# Patient Record
Sex: FEMALE | Race: WHITE | NOT HISPANIC OR LATINO | Employment: OTHER | URBAN - METROPOLITAN AREA
[De-identification: names, ages, dates, MRNs, and addresses within clinical notes are randomized per-mention and may not be internally consistent; named-entity substitution may affect disease eponyms.]

---

## 2019-09-25 ENCOUNTER — TELEPHONE (OUTPATIENT)
Dept: FAMILY MEDICINE | Facility: CLINIC | Age: 59
End: 2019-09-25

## 2019-09-25 NOTE — TELEPHONE ENCOUNTER
----- Message from Lulu Cruz sent at 9/25/2019 11:07 AM CDT -----  - pt would like an appt.   [phone]

## 2019-10-30 ENCOUNTER — TELEPHONE (OUTPATIENT)
Dept: FAMILY MEDICINE | Facility: CLINIC | Age: 59
End: 2019-10-30

## 2019-10-30 RX ORDER — DOXYCYCLINE 100 MG/1
100 CAPSULE ORAL 2 TIMES DAILY
Qty: 20 CAPSULE | Refills: 0 | Status: SHIPPED | OUTPATIENT
Start: 2019-10-30 | End: 2019-11-09

## 2019-10-30 NOTE — TELEPHONE ENCOUNTER
----- Message from Nallely John sent at 10/30/2019  9:11 AM CDT -----  Pt has a cold was wheezing a little last night and is feeling bad, congested, has been feeling like this since Friday wants something called in   [facility] [place]   [phone]

## 2019-12-16 ENCOUNTER — LAB VISIT (OUTPATIENT)
Dept: LAB | Facility: HOSPITAL | Age: 59
End: 2019-12-16
Attending: INTERNAL MEDICINE
Payer: COMMERCIAL

## 2019-12-16 DIAGNOSIS — I48.0 PAROXYSMAL ATRIAL FIBRILLATION: ICD-10-CM

## 2019-12-16 DIAGNOSIS — I50.9 HEART FAILURE, UNSPECIFIED: ICD-10-CM

## 2019-12-16 DIAGNOSIS — I10 ESSENTIAL HYPERTENSION, MALIGNANT: ICD-10-CM

## 2019-12-16 DIAGNOSIS — E78.5 HYPERLIPEMIA: Primary | ICD-10-CM

## 2019-12-16 LAB
ALBUMIN SERPL BCP-MCNC: 3.6 G/DL (ref 3.5–5.2)
ALP SERPL-CCNC: 73 U/L (ref 55–135)
ALT SERPL W/O P-5'-P-CCNC: 13 U/L (ref 10–44)
ANION GAP SERPL CALC-SCNC: 2 MMOL/L (ref 8–16)
AST SERPL-CCNC: 16 U/L (ref 10–40)
BASOPHILS # BLD AUTO: 0.02 K/UL (ref 0–0.2)
BASOPHILS NFR BLD: 0.5 % (ref 0–1.9)
BILIRUB SERPL-MCNC: 0.7 MG/DL (ref 0.1–1)
BUN SERPL-MCNC: 10 MG/DL (ref 8–23)
CALCIUM SERPL-MCNC: 9.9 MG/DL (ref 8.7–10.5)
CHLORIDE SERPL-SCNC: 106 MMOL/L (ref 95–110)
CHOLEST SERPL-MCNC: 194 MG/DL (ref 120–199)
CHOLEST/HDLC SERPL: 3.3 {RATIO} (ref 2–5)
CO2 SERPL-SCNC: 29 MMOL/L (ref 23–29)
CREAT SERPL-MCNC: 0.6 MG/DL (ref 0.5–1.4)
DIFFERENTIAL METHOD: ABNORMAL
EOSINOPHIL # BLD AUTO: 0.1 K/UL (ref 0–0.5)
EOSINOPHIL NFR BLD: 1.3 % (ref 0–8)
ERYTHROCYTE [DISTWIDTH] IN BLOOD BY AUTOMATED COUNT: 13.2 % (ref 11.5–14.5)
EST. GFR  (AFRICAN AMERICAN): >60 ML/MIN/1.73 M^2
EST. GFR  (NON AFRICAN AMERICAN): >60 ML/MIN/1.73 M^2
GLUCOSE SERPL-MCNC: 101 MG/DL (ref 70–110)
HCT VFR BLD AUTO: 36.5 % (ref 37–48.5)
HDLC SERPL-MCNC: 58 MG/DL (ref 40–75)
HDLC SERPL: 29.9 % (ref 20–50)
HGB BLD-MCNC: 11.4 G/DL (ref 12–16)
IMM GRANULOCYTES # BLD AUTO: 0.01 K/UL (ref 0–0.04)
IMM GRANULOCYTES NFR BLD AUTO: 0.3 % (ref 0–0.5)
LDLC SERPL CALC-MCNC: 128 MG/DL (ref 63–159)
LYMPHOCYTES # BLD AUTO: 1.7 K/UL (ref 1–4.8)
LYMPHOCYTES NFR BLD: 41.6 % (ref 18–48)
MAGNESIUM SERPL-MCNC: 1.8 MG/DL (ref 1.6–2.6)
MCH RBC QN AUTO: 31 PG (ref 27–31)
MCHC RBC AUTO-ENTMCNC: 31.2 G/DL (ref 32–36)
MCV RBC AUTO: 99 FL (ref 82–98)
MONOCYTES # BLD AUTO: 0.4 K/UL (ref 0.3–1)
MONOCYTES NFR BLD: 9.3 % (ref 4–15)
NEUTROPHILS # BLD AUTO: 1.9 K/UL (ref 1.8–7.7)
NEUTROPHILS NFR BLD: 47 % (ref 38–73)
NONHDLC SERPL-MCNC: 136 MG/DL
NRBC BLD-RTO: 0 /100 WBC
PLATELET # BLD AUTO: 210 K/UL (ref 150–350)
PMV BLD AUTO: 11.5 FL (ref 9.2–12.9)
POTASSIUM SERPL-SCNC: 3.9 MMOL/L (ref 3.5–5.1)
PROT SERPL-MCNC: 7.7 G/DL (ref 6–8.4)
RBC # BLD AUTO: 3.68 M/UL (ref 4–5.4)
SODIUM SERPL-SCNC: 137 MMOL/L (ref 136–145)
TRIGL SERPL-MCNC: 40 MG/DL (ref 30–150)
WBC # BLD AUTO: 3.97 K/UL (ref 3.9–12.7)

## 2019-12-16 PROCEDURE — 36415 COLL VENOUS BLD VENIPUNCTURE: CPT

## 2019-12-16 PROCEDURE — 85025 COMPLETE CBC W/AUTO DIFF WBC: CPT

## 2019-12-16 PROCEDURE — 83735 ASSAY OF MAGNESIUM: CPT

## 2019-12-16 PROCEDURE — 80061 LIPID PANEL: CPT

## 2019-12-16 PROCEDURE — 80053 COMPREHEN METABOLIC PANEL: CPT

## 2019-12-19 ENCOUNTER — OFFICE VISIT (OUTPATIENT)
Dept: FAMILY MEDICINE | Facility: CLINIC | Age: 59
End: 2019-12-19
Payer: COMMERCIAL

## 2019-12-19 VITALS
BODY MASS INDEX: 45.31 KG/M2 | HEIGHT: 61 IN | HEART RATE: 68 BPM | DIASTOLIC BLOOD PRESSURE: 92 MMHG | SYSTOLIC BLOOD PRESSURE: 152 MMHG | WEIGHT: 240 LBS

## 2019-12-19 DIAGNOSIS — I48.0 PAROXYSMAL ATRIAL FIBRILLATION: ICD-10-CM

## 2019-12-19 DIAGNOSIS — Z12.31 ENCOUNTER FOR SCREENING MAMMOGRAM FOR BREAST CANCER: Primary | ICD-10-CM

## 2019-12-19 DIAGNOSIS — Z00.00 ANNUAL PHYSICAL EXAM: Primary | ICD-10-CM

## 2019-12-19 DIAGNOSIS — Z12.39 BREAST CANCER SCREENING: ICD-10-CM

## 2019-12-19 DIAGNOSIS — I10 ESSENTIAL HYPERTENSION: ICD-10-CM

## 2019-12-19 PROCEDURE — 99396 PR PREVENTIVE VISIT,EST,40-64: ICD-10-PCS | Mod: S$GLB,,, | Performed by: FAMILY MEDICINE

## 2019-12-19 PROCEDURE — 99396 PREV VISIT EST AGE 40-64: CPT | Mod: S$GLB,,, | Performed by: FAMILY MEDICINE

## 2019-12-19 RX ORDER — METOPROLOL TARTRATE 50 MG/1
50 TABLET ORAL 2 TIMES DAILY
Refills: 2 | COMMUNITY
Start: 2019-09-26 | End: 2020-09-29

## 2019-12-19 RX ORDER — APIXABAN 5 MG/1
5 TABLET, FILM COATED ORAL 2 TIMES DAILY
Refills: 2 | COMMUNITY
Start: 2019-09-26 | End: 2020-10-03

## 2019-12-19 RX ORDER — LOSARTAN POTASSIUM 100 MG/1
100 TABLET ORAL DAILY
Refills: 5 | COMMUNITY
Start: 2019-10-07 | End: 2021-04-19 | Stop reason: SDUPTHER

## 2019-12-19 NOTE — PROGRESS NOTES
SUBJECTIVE:   HPI: Paola Ibanez  is a 59 y.o. female who presents for annual physical .   Regular Check Up    Needs eye appointment. UTD dental appt. Declines flu shot. Mammogram due. Follow with cardiologist for afib. BP stays 150/90 and reports no changes made by cardiology. Labs reviewed and are doing well. Weight stable. No regular exercise. Caring for  who has had recent toe amputations.  No falls.  C/o onychomycosis. Using otc agents. UTD with colonscopy     Lab Visit on 12/16/2019   Component Date Value Ref Range Status    Sodium 12/16/2019 137  136 - 145 mmol/L Final    Potassium 12/16/2019 3.9  3.5 - 5.1 mmol/L Final    Chloride 12/16/2019 106  95 - 110 mmol/L Final    CO2 12/16/2019 29  23 - 29 mmol/L Final    Glucose 12/16/2019 101  70 - 110 mg/dL Final    BUN, Bld 12/16/2019 10  8 - 23 mg/dL Final    Creatinine 12/16/2019 0.6  0.5 - 1.4 mg/dL Final    Calcium 12/16/2019 9.9  8.7 - 10.5 mg/dL Final    Total Protein 12/16/2019 7.7  6.0 - 8.4 g/dL Final    Albumin 12/16/2019 3.6  3.5 - 5.2 g/dL Final    Total Bilirubin 12/16/2019 0.7  0.1 - 1.0 mg/dL Final    Alkaline Phosphatase 12/16/2019 73  55 - 135 U/L Final    AST 12/16/2019 16  10 - 40 U/L Final    ALT 12/16/2019 13  10 - 44 U/L Final    Anion Gap 12/16/2019 2* 8 - 16 mmol/L Final    eGFR if African American 12/16/2019 >60.0  >60 mL/min/1.73 m^2 Final    eGFR if non African American 12/16/2019 >60.0  >60 mL/min/1.73 m^2 Final    Cholesterol 12/16/2019 194  120 - 199 mg/dL Final    Triglycerides 12/16/2019 40  30 - 150 mg/dL Final    HDL 12/16/2019 58  40 - 75 mg/dL Final    LDL Cholesterol 12/16/2019 128.0  63.0 - 159.0 mg/dL Final    Hdl/Cholesterol Ratio 12/16/2019 29.9  20.0 - 50.0 % Final    Total Cholesterol/HDL Ratio 12/16/2019 3.3  2.0 - 5.0 Final    Non-HDL Cholesterol 12/16/2019 136  mg/dL Final    WBC 12/16/2019 3.97  3.90 - 12.70 K/uL Final    RBC 12/16/2019 3.68* 4.00 - 5.40 M/uL Final     Hemoglobin 12/16/2019 11.4* 12.0 - 16.0 g/dL Final    Hematocrit 12/16/2019 36.5* 37.0 - 48.5 % Final    Mean Corpuscular Volume 12/16/2019 99* 82 - 98 fL Final    Mean Corpuscular Hemoglobin 12/16/2019 31.0  27.0 - 31.0 pg Final    Mean Corpuscular Hemoglobin Conc 12/16/2019 31.2* 32.0 - 36.0 g/dL Final    RDW 12/16/2019 13.2  11.5 - 14.5 % Final    Platelets 12/16/2019 210  150 - 350 K/uL Final    MPV 12/16/2019 11.5  9.2 - 12.9 fL Final    Immature Granulocytes 12/16/2019 0.3  0.0 - 0.5 % Final    Gran # (ANC) 12/16/2019 1.9  1.8 - 7.7 K/uL Final    Immature Grans (Abs) 12/16/2019 0.01  0.00 - 0.04 K/uL Final    Lymph # 12/16/2019 1.7  1.0 - 4.8 K/uL Final    Mono # 12/16/2019 0.4  0.3 - 1.0 K/uL Final    Eos # 12/16/2019 0.1  0.0 - 0.5 K/uL Final    Baso # 12/16/2019 0.02  0.00 - 0.20 K/uL Final    nRBC 12/16/2019 0  0 /100 WBC Final    Gran% 12/16/2019 47.0  38.0 - 73.0 % Final    Lymph% 12/16/2019 41.6  18.0 - 48.0 % Final    Mono% 12/16/2019 9.3  4.0 - 15.0 % Final    Eosinophil% 12/16/2019 1.3  0.0 - 8.0 % Final    Basophil% 12/16/2019 0.5  0.0 - 1.9 % Final    Differential Method 12/16/2019 Automated   Final    Magnesium 12/16/2019 1.8  1.6 - 2.6 mg/dL Final       (Not in a hospital admission)  Review of patient's allergies indicates:   Allergen Reactions    Aspirin Nausea Only     Stomach Pain  Stomach Pain      Lisinopril Other (See Comments)     Cough  Cough      Penicillin g potassium Rash     Rash  Rash       Current Outpatient Medications on File Prior to Visit   Medication Sig Dispense Refill    ELIQUIS 5 mg Tab Take 5 mg by mouth 2 (two) times daily.  2    losartan (COZAAR) 100 MG tablet Take 100 mg by mouth once daily.  5    metoprolol tartrate (LOPRESSOR) 50 MG tablet Take 50 mg by mouth 2 (two) times daily.  2     No current facility-administered medications on file prior to visit.      History reviewed. No pertinent past medical history.  History reviewed. No  "pertinent surgical history.  Family History   Problem Relation Age of Onset    Seizures Sister      Social History     Tobacco Use    Smoking status: Never Smoker    Smokeless tobacco: Never Used   Substance Use Topics    Alcohol use: Not Currently    Drug use: Never      Health Maintenance Topics with due status: Not Due       Topic Last Completion Date    Colonoscopy 03/02/2015    Lipid Panel 12/16/2019       There is no immunization history on file for this patient.    Review of Systems   Constitutional: Negative for activity change, fatigue and unexpected weight change.   HENT: Negative for hearing loss, postnasal drip, sinus pressure, sore throat and voice change.    Eyes: Negative for photophobia and visual disturbance.   Respiratory: Negative for cough, shortness of breath and wheezing.    Cardiovascular: Negative for chest pain and palpitations.   Gastrointestinal: Negative for constipation, diarrhea and nausea.   Genitourinary: Negative for difficulty urinating, frequency, hematuria and urgency.   Musculoskeletal: Negative for arthralgias and back pain.   Skin: Positive for rash.   Neurological: Negative for weakness, light-headedness and headaches.   Hematological: Negative for adenopathy. Does not bruise/bleed easily.   Psychiatric/Behavioral: The patient is not nervous/anxious.       OBJECTIVE:      Vitals:    12/19/19 0853   BP: (!) 152/92   Pulse: 68   Weight: 108.9 kg (240 lb)   Height: 5' 1" (1.549 m)     Physical Exam   Constitutional: She is oriented to person, place, and time. Vital signs are normal. She appears well-developed and well-nourished. No distress.   HENT:   Head: Normocephalic and atraumatic.   Right Ear: Tympanic membrane and external ear normal.   Left Ear: Tympanic membrane and external ear normal.   Eyes: Pupils are equal, round, and reactive to light. Conjunctivae, EOM and lids are normal.   Neck: Full passive range of motion without pain. Neck supple. No JVD present. No " tracheal deviation present. No thyromegaly present.   Cardiovascular: Normal rate and regular rhythm. PMI is not displaced.   Pulmonary/Chest: Effort normal and breath sounds normal.   Abdominal: Soft. Bowel sounds are normal. There is no hepatosplenomegaly. There is no tenderness. There is no rebound and no guarding.   Musculoskeletal: Normal range of motion. She exhibits no edema or tenderness.   Neurological: She is alert and oriented to person, place, and time.   Skin: Skin is warm and dry. No rash noted.   Psychiatric: She has a normal mood and affect.   Vitals reviewed.     Assessment:       1. Annual physical exam    2. Breast cancer screening    3. Paroxysmal atrial fibrillation    4. Essential hypertension        Plan:       Annual physical exam    Breast cancer screening  -     Mammo Digital Screening Bilat w/ Jonnathan; Future; Expected date: 12/19/2019    Paroxysmal atrial fibrillation    Essential hypertension        Counseled on age and gender appropriate medical preventative services, including cancer screenings, immunizations, overall nutritional health, need for a consistent exercise regimen and an overall push towards maintaining a vigorous and active lifestyle.      Follow up in about 1 year (around 12/19/2020) for Annual Physical.

## 2020-03-14 ENCOUNTER — HOSPITAL ENCOUNTER (EMERGENCY)
Facility: HOSPITAL | Age: 60
Discharge: HOME | End: 2020-03-14
Attending: EMERGENCY MEDICINE
Payer: COMMERCIAL

## 2020-03-14 VITALS
HEART RATE: 59 BPM | WEIGHT: 245 LBS | SYSTOLIC BLOOD PRESSURE: 154 MMHG | DIASTOLIC BLOOD PRESSURE: 100 MMHG | TEMPERATURE: 98 F | RESPIRATION RATE: 20 BRPM | OXYGEN SATURATION: 98 % | HEIGHT: 61 IN | BODY MASS INDEX: 46.26 KG/M2

## 2020-03-14 DIAGNOSIS — K62.5 RECTAL BLEEDING: Primary | ICD-10-CM

## 2020-03-14 LAB
ABO + RH BLD: NORMAL
ALBUMIN SERPL BCP-MCNC: 3.8 G/DL (ref 3.5–5.2)
ALP SERPL-CCNC: 85 U/L (ref 55–135)
ALT SERPL W/O P-5'-P-CCNC: 14 U/L (ref 10–44)
ANION GAP SERPL CALC-SCNC: 4 MMOL/L (ref 8–16)
AST SERPL-CCNC: 14 U/L (ref 10–40)
BASOPHILS # BLD AUTO: 0.03 K/UL (ref 0–0.2)
BASOPHILS NFR BLD: 0.7 % (ref 0–1.9)
BILIRUB SERPL-MCNC: 1.1 MG/DL (ref 0.1–1)
BLD GP AB SCN CELLS X3 SERPL QL: NORMAL
BUN SERPL-MCNC: 16 MG/DL (ref 8–23)
CALCIUM SERPL-MCNC: 9.9 MG/DL (ref 8.7–10.5)
CHLORIDE SERPL-SCNC: 106 MMOL/L (ref 95–110)
CO2 SERPL-SCNC: 26 MMOL/L (ref 23–29)
CREAT SERPL-MCNC: 0.7 MG/DL (ref 0.5–1.4)
DIFFERENTIAL METHOD: ABNORMAL
EOSINOPHIL # BLD AUTO: 0 K/UL (ref 0–0.5)
EOSINOPHIL NFR BLD: 0.7 % (ref 0–8)
ERYTHROCYTE [DISTWIDTH] IN BLOOD BY AUTOMATED COUNT: 12.9 % (ref 11.5–14.5)
EST. GFR  (AFRICAN AMERICAN): >60 ML/MIN/1.73 M^2
EST. GFR  (NON AFRICAN AMERICAN): >60 ML/MIN/1.73 M^2
GLUCOSE SERPL-MCNC: 120 MG/DL (ref 70–110)
HCT VFR BLD AUTO: 34.3 % (ref 37–48.5)
HGB BLD-MCNC: 11 G/DL (ref 12–16)
IMM GRANULOCYTES # BLD AUTO: 0.01 K/UL (ref 0–0.04)
IMM GRANULOCYTES NFR BLD AUTO: 0.2 % (ref 0–0.5)
INR PPP: 1.3
LYMPHOCYTES # BLD AUTO: 1.4 K/UL (ref 1–4.8)
LYMPHOCYTES NFR BLD: 33.2 % (ref 18–48)
MCH RBC QN AUTO: 31.1 PG (ref 27–31)
MCHC RBC AUTO-ENTMCNC: 32.1 G/DL (ref 32–36)
MCV RBC AUTO: 97 FL (ref 82–98)
MONOCYTES # BLD AUTO: 0.4 K/UL (ref 0.3–1)
MONOCYTES NFR BLD: 8.8 % (ref 4–15)
NEUTROPHILS # BLD AUTO: 2.4 K/UL (ref 1.8–7.7)
NEUTROPHILS NFR BLD: 56.4 % (ref 38–73)
NRBC BLD-RTO: 0 /100 WBC
OB PNL STL: POSITIVE
PLATELET # BLD AUTO: 204 K/UL (ref 150–350)
PMV BLD AUTO: 10.9 FL (ref 9.2–12.9)
POTASSIUM SERPL-SCNC: 3.7 MMOL/L (ref 3.5–5.1)
PROT SERPL-MCNC: 7.8 G/DL (ref 6–8.4)
PROTHROMBIN TIME: 15.1 SEC (ref 10.6–14.8)
RBC # BLD AUTO: 3.54 M/UL (ref 4–5.4)
SODIUM SERPL-SCNC: 136 MMOL/L (ref 136–145)
WBC # BLD AUTO: 4.22 K/UL (ref 3.9–12.7)

## 2020-03-14 PROCEDURE — 82272 OCCULT BLD FECES 1-3 TESTS: CPT

## 2020-03-14 PROCEDURE — 86850 RBC ANTIBODY SCREEN: CPT

## 2020-03-14 PROCEDURE — 85610 PROTHROMBIN TIME: CPT

## 2020-03-14 PROCEDURE — 80053 COMPREHEN METABOLIC PANEL: CPT

## 2020-03-14 PROCEDURE — 99283 EMERGENCY DEPT VISIT LOW MDM: CPT | Mod: 25

## 2020-03-14 PROCEDURE — 85025 COMPLETE CBC W/AUTO DIFF WBC: CPT

## 2020-03-14 RX ORDER — DOCUSATE SODIUM 100 MG/1
100 CAPSULE, LIQUID FILLED ORAL 2 TIMES DAILY
Qty: 28 CAPSULE | Refills: 0 | Status: SHIPPED | OUTPATIENT
Start: 2020-03-14 | End: 2020-03-28

## 2020-03-14 NOTE — ED PROVIDER NOTES
"Encounter Date: 3/14/2020       History     Chief Complaint   Patient presents with    Rectal Bleeding     "bright red blood in stools this morning"      60-year-old female with a history of diverticulitis, AFib on Eliquis presents to the ER with rectal bleeding.  Patient states that for the past couple of days, she has had a change in her bowel movements.  States that it has come out in small amounts, like yang, but denies them being hard or having to strain.  This evening, she had multiple bowel movements with brown stool covered in bright red blood.  States she saw small blood clots in the toilet.  Denies any other symptoms.  Denies fever, nausea or vomiting, chest pain, shortness of breath, abdominal pain, or changes in bladder function.  Notes that she has had hemorrhoids in the past, but denies having any hemorrhoids currently.  Denies pain with wiping.  Notes that when she had diverticulitis, she had pain, which she said she is not having now.  States that her last colonoscopy was 3-4 years ago.  Had polyps, but was told they were benign.        Review of patient's allergies indicates:   Allergen Reactions    Aspirin Nausea Only     Stomach Pain  Stomach Pain      Lisinopril Other (See Comments)     Cough  Cough      Penicillin g potassium Rash     Rash  Rash       No past medical history on file.  No past surgical history on file.  Family History   Problem Relation Age of Onset    Seizures Sister      Social History     Tobacco Use    Smoking status: Never Smoker    Smokeless tobacco: Never Used   Substance Use Topics    Alcohol use: Not Currently    Drug use: Never     Review of Systems   Constitutional: Negative for fever.   HENT: Negative for sore throat.    Respiratory: Negative for shortness of breath.    Cardiovascular: Negative for chest pain.   Gastrointestinal: Positive for blood in stool and constipation. Negative for abdominal pain, diarrhea, nausea and vomiting.   Genitourinary: " Negative for dysuria.   Musculoskeletal: Negative for back pain.   Skin: Negative for rash.   Neurological: Negative for weakness.   Hematological: Does not bruise/bleed easily.   All other systems reviewed and are negative.      Physical Exam     Initial Vitals [03/14/20 0440]   BP Pulse Resp Temp SpO2   (!) 211/104 62 18 98.4 °F (36.9 °C) 98 %      MAP       --         Physical Exam    Constitutional: She appears well-developed and well-nourished. No distress.   HENT:   Head: Normocephalic and atraumatic.   Mouth/Throat: Oropharynx is clear and moist.   Eyes: Conjunctivae and EOM are normal. Pupils are equal, round, and reactive to light.   Neck: Normal range of motion.   Cardiovascular: Normal rate, regular rhythm, normal heart sounds and intact distal pulses.   No murmur heard.  Pulmonary/Chest: Breath sounds normal. No respiratory distress. She has no wheezes. She has no rhonchi. She has no rales.   Abdominal: Soft. Bowel sounds are normal. She exhibits no distension. There is no tenderness. There is no rebound and no guarding.   Genitourinary: Rectal exam shows external hemorrhoid. Rectal exam shows no fissure, no mass, no tenderness and anal tone normal.   Genitourinary Comments: Small external hemorrhoids, but none of them appeared to be bleeding.  Rectal exam showed a very small amount of irina blood.  No melena.   Musculoskeletal: Normal range of motion. She exhibits no edema or tenderness.   Neurological: She is alert.   Skin: Skin is warm and dry.   Psychiatric: She has a normal mood and affect. Thought content normal.         ED Course   Procedures  Labs Reviewed   CBC W/ AUTO DIFFERENTIAL - Abnormal; Notable for the following components:       Result Value    RBC 3.54 (*)     Hemoglobin 11.0 (*)     Hematocrit 34.3 (*)     Mean Corpuscular Hemoglobin 31.1 (*)     All other components within normal limits   COMPREHENSIVE METABOLIC PANEL - Abnormal; Notable for the following components:    Glucose 120  (*)     Total Bilirubin 1.1 (*)     Anion Gap 4 (*)     All other components within normal limits   PROTIME-INR - Abnormal; Notable for the following components:    PT 15.1 (*)     All other components within normal limits   OCCULT BLOOD X 1, STOOL - Abnormal; Notable for the following components:    Occult Blood Positive (*)     All other components within normal limits   TYPE & SCREEN          Imaging Results    None          Medical Decision Making:   Initial Assessment:   60-year-old female with a history of diverticulitis, AFib on Eliquis presents to the ER with rectal bleeding.   ED Management:  Plan:  Afebrile, blood pressure triage was 211/104, vital signs otherwise stable.  Rectal exam showed small hemorrhoids, but none appear to be bleeding.  Small amount of irina blood.  Patient otherwise asymptomatic.  Will get labs and reassess.    Reassessed.  Patient sitting up in bed in no acute distress.  States she had another bowel movement that had much less blood, just a small amount.  She is otherwise asymptomatic.  She has been hemodynamically stable.  Lab show WBC 4.2.  H&H 11/34.3, similar to previous.  BUN 16, creatinine 0.7.  INR 1.3.  Patient is having a small amount of rectal bleeding.  Unclear cause at this time.  Could be internal hemorrhoid, diverticulosis, AVM, colonic mass.  Does not appear to need any acute intervention transfusion at this time.  Recommend follow-up with PCP and GI for further management, specifically a colonoscopy.  Given strict return precautions.  Patient understands plan.                                 Clinical Impression:       ICD-10-CM ICD-9-CM   1. Rectal bleeding K62.5 569.3             ED Disposition Condition    Discharge Stable        ED Prescriptions     Medication Sig Dispense Start Date End Date Auth. Provider    docusate sodium (COLACE) 100 MG capsule Take 1 capsule (100 mg total) by mouth 2 (two) times daily. for 14 days 28 capsule 3/14/2020 3/28/2020 Sunday ENGLISH  MD Sameer        Follow-up Information     Follow up With Specialties Details Why Contact Info    Sydney Ruvalcaba MD Family Medicine Schedule an appointment as soon as possible for a visit  For further evaluation of your symptoms. [address]  [address]  [place] [address]  [phone]      Clyde Doan MD Gastroenterology Schedule an appointment as soon as possible for a visit  For further evaluation of your symptoms. [address]  [address]  [place] [address]  [phone]                                       Sunday Estrella MD  03/15/20 0422

## 2020-03-16 ENCOUNTER — TELEPHONE (OUTPATIENT)
Dept: FAMILY MEDICINE | Facility: CLINIC | Age: 60
End: 2020-03-16

## 2020-03-16 DIAGNOSIS — K62.5 RECTAL BLEED: Primary | ICD-10-CM

## 2020-03-16 NOTE — TELEPHONE ENCOUNTER
----- Message from Heena Hoffmann sent at 3/16/2020  9:04 AM CDT -----  Contact: Paola Ibanez  Pt went to the ER Saturday because she was passing a lot of blood in her stool and [facility] told her to schedule a Er Follow up with Dr. Ruvalcaba. Please give the pt a call back # [phone]

## 2020-03-17 ENCOUNTER — TELEPHONE (OUTPATIENT)
Dept: FAMILY MEDICINE | Facility: CLINIC | Age: 60
End: 2020-03-17

## 2020-03-17 NOTE — TELEPHONE ENCOUNTER
----- Message from Constance Tang sent at 3/17/2020  1:00 PM CDT -----  Pt needs a referral to Dr. Rincon. Pt #[phone]

## 2020-03-17 NOTE — TELEPHONE ENCOUNTER
----- Message from Starr Edward sent at 3/17/2020  3:10 PM CDT -----  Contact: MARCELA [facility].  1:50  Marcela is asking for a call back. She is [facility]. Marcela # [phone]

## 2020-03-17 NOTE — TELEPHONE ENCOUNTER
----- Message from Fouzia Aragon sent at 3/17/2020  9:12 AM CDT -----  Pt called in regards to the referral to Dr. Doan he does not take her [employer] insurance she is needing a new referral. Pt states she is needing this soon she is bleeding.   # [phone]

## 2020-05-05 ENCOUNTER — LAB VISIT (OUTPATIENT)
Dept: LAB | Facility: HOSPITAL | Age: 60
End: 2020-05-05
Attending: INTERNAL MEDICINE
Payer: COMMERCIAL

## 2020-05-05 DIAGNOSIS — I50.9 HEART FAILURE, UNSPECIFIED: ICD-10-CM

## 2020-05-05 DIAGNOSIS — E78.5 HYPERLIPEMIA: Primary | ICD-10-CM

## 2020-05-05 DIAGNOSIS — I48.0 PAROXYSMAL ATRIAL FIBRILLATION: ICD-10-CM

## 2020-05-05 DIAGNOSIS — R00.2 PALPITATIONS: ICD-10-CM

## 2020-05-05 LAB
ALBUMIN SERPL BCP-MCNC: 3.8 G/DL (ref 3.5–5.2)
ALP SERPL-CCNC: 89 U/L (ref 55–135)
ALT SERPL W/O P-5'-P-CCNC: 16 U/L (ref 10–44)
ANION GAP SERPL CALC-SCNC: 4 MMOL/L (ref 8–16)
AST SERPL-CCNC: 15 U/L (ref 10–40)
BASOPHILS # BLD AUTO: 0.03 K/UL (ref 0–0.2)
BASOPHILS NFR BLD: 0.6 % (ref 0–1.9)
BILIRUB SERPL-MCNC: 0.7 MG/DL (ref 0.1–1)
BUN SERPL-MCNC: 15 MG/DL (ref 8–23)
CALCIUM SERPL-MCNC: 10.1 MG/DL (ref 8.7–10.5)
CHLORIDE SERPL-SCNC: 106 MMOL/L (ref 95–110)
CHOLEST SERPL-MCNC: 187 MG/DL (ref 120–199)
CHOLEST/HDLC SERPL: 3.1 {RATIO} (ref 2–5)
CO2 SERPL-SCNC: 30 MMOL/L (ref 23–29)
CREAT SERPL-MCNC: 0.7 MG/DL (ref 0.5–1.4)
DIFFERENTIAL METHOD: ABNORMAL
EOSINOPHIL # BLD AUTO: 0 K/UL (ref 0–0.5)
EOSINOPHIL NFR BLD: 0.8 % (ref 0–8)
ERYTHROCYTE [DISTWIDTH] IN BLOOD BY AUTOMATED COUNT: 13.2 % (ref 11.5–14.5)
EST. GFR  (AFRICAN AMERICAN): >60 ML/MIN/1.73 M^2
EST. GFR  (NON AFRICAN AMERICAN): >60 ML/MIN/1.73 M^2
GLUCOSE SERPL-MCNC: 104 MG/DL (ref 70–110)
HCT VFR BLD AUTO: 37.8 % (ref 37–48.5)
HDLC SERPL-MCNC: 60 MG/DL (ref 40–75)
HDLC SERPL: 32.1 % (ref 20–50)
HGB BLD-MCNC: 11.5 G/DL (ref 12–16)
IMM GRANULOCYTES # BLD AUTO: 0.01 K/UL (ref 0–0.04)
IMM GRANULOCYTES NFR BLD AUTO: 0.2 % (ref 0–0.5)
LDLC SERPL CALC-MCNC: 111.4 MG/DL (ref 63–159)
LYMPHOCYTES # BLD AUTO: 2.2 K/UL (ref 1–4.8)
LYMPHOCYTES NFR BLD: 46.7 % (ref 18–48)
MAGNESIUM SERPL-MCNC: 1.9 MG/DL (ref 1.6–2.6)
MCH RBC QN AUTO: 29.9 PG (ref 27–31)
MCHC RBC AUTO-ENTMCNC: 30.4 G/DL (ref 32–36)
MCV RBC AUTO: 98 FL (ref 82–98)
MONOCYTES # BLD AUTO: 0.5 K/UL (ref 0.3–1)
MONOCYTES NFR BLD: 10.2 % (ref 4–15)
NEUTROPHILS # BLD AUTO: 2 K/UL (ref 1.8–7.7)
NEUTROPHILS NFR BLD: 41.5 % (ref 38–73)
NONHDLC SERPL-MCNC: 127 MG/DL
NRBC BLD-RTO: 0 /100 WBC
PLATELET # BLD AUTO: 228 K/UL (ref 150–350)
PMV BLD AUTO: 11.5 FL (ref 9.2–12.9)
POTASSIUM SERPL-SCNC: 4.2 MMOL/L (ref 3.5–5.1)
PROT SERPL-MCNC: 8.1 G/DL (ref 6–8.4)
RBC # BLD AUTO: 3.85 M/UL (ref 4–5.4)
SODIUM SERPL-SCNC: 140 MMOL/L (ref 136–145)
TRIGL SERPL-MCNC: 78 MG/DL (ref 30–150)
WBC # BLD AUTO: 4.71 K/UL (ref 3.9–12.7)

## 2020-05-05 PROCEDURE — 80061 LIPID PANEL: CPT

## 2020-05-05 PROCEDURE — 36415 COLL VENOUS BLD VENIPUNCTURE: CPT

## 2020-05-05 PROCEDURE — 83735 ASSAY OF MAGNESIUM: CPT

## 2020-05-05 PROCEDURE — 80053 COMPREHEN METABOLIC PANEL: CPT

## 2020-05-05 PROCEDURE — 85025 COMPLETE CBC W/AUTO DIFF WBC: CPT

## 2020-05-21 ENCOUNTER — TELEPHONE (OUTPATIENT)
Dept: FAMILY MEDICINE | Facility: CLINIC | Age: 60
End: 2020-05-21

## 2020-05-21 NOTE — TELEPHONE ENCOUNTER
----- Message from Fouzia Aragon sent at 5/19/2020  4:53 PM CDT -----  vm @ 4:33 returning a missed nurse call  # [phone]

## 2020-06-29 ENCOUNTER — HOSPITAL ENCOUNTER (OUTPATIENT)
Dept: RADIOLOGY | Facility: HOSPITAL | Age: 60
Discharge: HOME | End: 2020-06-29
Attending: FAMILY MEDICINE
Payer: COMMERCIAL

## 2020-06-29 VITALS — WEIGHT: 244.94 LBS | BODY MASS INDEX: 46.24 KG/M2 | HEIGHT: 61 IN

## 2020-06-29 DIAGNOSIS — Z12.31 ENCOUNTER FOR SCREENING MAMMOGRAM FOR BREAST CANCER: ICD-10-CM

## 2020-06-29 PROCEDURE — 77067 SCR MAMMO BI INCL CAD: CPT | Mod: TC,PO

## 2020-06-30 ENCOUNTER — TELEPHONE (OUTPATIENT)
Dept: FAMILY MEDICINE | Facility: CLINIC | Age: 60
End: 2020-06-30

## 2020-06-30 NOTE — TELEPHONE ENCOUNTER
----- Message from Nallely John sent at 6/30/2020  3:02 PM CDT -----  Regarding: mammo reselif  Pt is wants to know her mammo results  [phone]

## 2020-06-30 NOTE — TELEPHONE ENCOUNTER
Negative mammogram results.  Patient notified, verbalized understanding.  Informed pt to call with questions/concerns -DN

## 2020-12-23 ENCOUNTER — TELEPHONE (OUTPATIENT)
Dept: CARDIOLOGY | Facility: CLINIC | Age: 60
End: 2020-12-23

## 2020-12-23 NOTE — TELEPHONE ENCOUNTER
Spoke with pt and informed pt Dr. Teague had an STEMI in ED and appt had to be r/s. Pt understood emergencies happen but she was upset. I offered pt an appt in Jan with him or another physician. Pt refused and said she is going to find another dr since Dr. TEAGUE is leaving in Jan anyway. Pt said she will find one out of [place]. Patient said no hard feelings just upset how her  was treated, also.

## 2020-12-29 ENCOUNTER — OFFICE VISIT (OUTPATIENT)
Dept: FAMILY MEDICINE | Facility: CLINIC | Age: 60
End: 2020-12-29
Payer: COMMERCIAL

## 2020-12-29 VITALS
SYSTOLIC BLOOD PRESSURE: 126 MMHG | HEIGHT: 61 IN | WEIGHT: 233 LBS | DIASTOLIC BLOOD PRESSURE: 80 MMHG | BODY MASS INDEX: 43.99 KG/M2 | HEART RATE: 60 BPM

## 2020-12-29 DIAGNOSIS — Z23 NEED FOR TETANUS, DIPHTHERIA, AND ACELLULAR PERTUSSIS (TDAP) VACCINE: ICD-10-CM

## 2020-12-29 DIAGNOSIS — I10 ESSENTIAL HYPERTENSION: ICD-10-CM

## 2020-12-29 DIAGNOSIS — K57.30 DIVERTICULOSIS OF COLON: ICD-10-CM

## 2020-12-29 DIAGNOSIS — K59.04 CHRONIC IDIOPATHIC CONSTIPATION: ICD-10-CM

## 2020-12-29 DIAGNOSIS — I48.0 PAROXYSMAL ATRIAL FIBRILLATION: ICD-10-CM

## 2020-12-29 DIAGNOSIS — Z87.19 HISTORY OF LOWER GI BLEEDING: ICD-10-CM

## 2020-12-29 DIAGNOSIS — Z00.00 ANNUAL PHYSICAL EXAM: Primary | ICD-10-CM

## 2020-12-29 PROCEDURE — 90471 TDAP VACCINE GREATER THAN OR EQUAL TO 7YO IM: ICD-10-PCS | Mod: S$GLB,,, | Performed by: FAMILY MEDICINE

## 2020-12-29 PROCEDURE — 99396 PR PREVENTIVE VISIT,EST,40-64: ICD-10-PCS | Mod: 25,S$GLB,, | Performed by: FAMILY MEDICINE

## 2020-12-29 PROCEDURE — 90715 TDAP VACCINE 7 YRS/> IM: CPT | Mod: S$GLB,,, | Performed by: FAMILY MEDICINE

## 2020-12-29 PROCEDURE — 90715 TDAP VACCINE GREATER THAN OR EQUAL TO 7YO IM: ICD-10-PCS | Mod: S$GLB,,, | Performed by: FAMILY MEDICINE

## 2020-12-29 PROCEDURE — 99396 PREV VISIT EST AGE 40-64: CPT | Mod: 25,S$GLB,, | Performed by: FAMILY MEDICINE

## 2020-12-29 PROCEDURE — 90471 IMMUNIZATION ADMIN: CPT | Mod: S$GLB,,, | Performed by: FAMILY MEDICINE

## 2020-12-29 RX ORDER — PSYLLIUM SEED
1 PACKET (EA) ORAL DAILY
Qty: 283 G | Refills: 0
Start: 2020-12-29 | End: 2024-02-20

## 2020-12-29 NOTE — PROGRESS NOTES
SUBJECTIVE:   HPI: Paola Ibanez  is a 60 y.o. female who presents for annual physical .   Annual Exam (no bottles tb )    Patient with h/o HTN and afib in for annual visit.  Has had some stress this year with the health of her . He is now doing better. She is UTD with mammogram and colon cancer screening. recent labs in May were all normal. Eliquis was stopped due to GI bleed. This summer.  Had colonoscopy that  Showed diverticulosis and no active bleeding . Was instructed to increase fiber due to constipation.She has not had any follow up since then  Seeing a new cardiologist in [place] today. HR and BP is normal      No visits with results within 6 Month(s) from this visit.   Latest known visit with results is:   Lab Visit on 05/05/2020   Component Date Value Ref Range Status    Sodium 05/05/2020 140  136 - 145 mmol/L Final    Potassium 05/05/2020 4.2  3.5 - 5.1 mmol/L Final    Chloride 05/05/2020 106  95 - 110 mmol/L Final    CO2 05/05/2020 30* 23 - 29 mmol/L Final    Glucose 05/05/2020 104  70 - 110 mg/dL Final    BUN 05/05/2020 15  8 - 23 mg/dL Final    Creatinine 05/05/2020 0.7  0.5 - 1.4 mg/dL Final    Calcium 05/05/2020 10.1  8.7 - 10.5 mg/dL Final    Total Protein 05/05/2020 8.1  6.0 - 8.4 g/dL Final    Albumin 05/05/2020 3.8  3.5 - 5.2 g/dL Final    Total Bilirubin 05/05/2020 0.7  0.1 - 1.0 mg/dL Final    Alkaline Phosphatase 05/05/2020 89  55 - 135 U/L Final    AST 05/05/2020 15  10 - 40 U/L Final    ALT 05/05/2020 16  10 - 44 U/L Final    Anion Gap 05/05/2020 4* 8 - 16 mmol/L Final    eGFR if African American 05/05/2020 >60.0  >60 mL/min/1.73 m^2 Final    eGFR if non African American 05/05/2020 >60.0  >60 mL/min/1.73 m^2 Final    Cholesterol 05/05/2020 187  120 - 199 mg/dL Final    Triglycerides 05/05/2020 78  30 - 150 mg/dL Final    HDL 05/05/2020 60  40 - 75 mg/dL Final    LDL Cholesterol 05/05/2020 111.4  63.0 - 159.0 mg/dL Final    HDL/Cholesterol Ratio  05/05/2020 32.1  20.0 - 50.0 % Final    Total Cholesterol/HDL Ratio 05/05/2020 3.1  2.0 - 5.0 Final    Non-HDL Cholesterol 05/05/2020 127  mg/dL Final    WBC 05/05/2020 4.71  3.90 - 12.70 K/uL Final    RBC 05/05/2020 3.85* 4.00 - 5.40 M/uL Final    Hemoglobin 05/05/2020 11.5* 12.0 - 16.0 g/dL Final    Hematocrit 05/05/2020 37.8  37.0 - 48.5 % Final    MCV 05/05/2020 98  82 - 98 fL Final    MCH 05/05/2020 29.9  27.0 - 31.0 pg Final    MCHC 05/05/2020 30.4* 32.0 - 36.0 g/dL Final    RDW 05/05/2020 13.2  11.5 - 14.5 % Final    Platelets 05/05/2020 228  150 - 350 K/uL Final    MPV 05/05/2020 11.5  9.2 - 12.9 fL Final    Immature Granulocytes 05/05/2020 0.2  0.0 - 0.5 % Final    Gran # (ANC) 05/05/2020 2.0  1.8 - 7.7 K/uL Final    Immature Grans (Abs) 05/05/2020 0.01  0.00 - 0.04 K/uL Final    Lymph # 05/05/2020 2.2  1.0 - 4.8 K/uL Final    Mono # 05/05/2020 0.5  0.3 - 1.0 K/uL Final    Eos # 05/05/2020 0.0  0.0 - 0.5 K/uL Final    Baso # 05/05/2020 0.03  0.00 - 0.20 K/uL Final    nRBC 05/05/2020 0  0 /100 WBC Final    Gran % 05/05/2020 41.5  38.0 - 73.0 % Final    Lymph % 05/05/2020 46.7  18.0 - 48.0 % Final    Mono % 05/05/2020 10.2  4.0 - 15.0 % Final    Eosinophil % 05/05/2020 0.8  0.0 - 8.0 % Final    Basophil % 05/05/2020 0.6  0.0 - 1.9 % Final    Differential Method 05/05/2020 Automated   Final    Magnesium 05/05/2020 1.9  1.6 - 2.6 mg/dL Final     (Not in a hospital admission)    Review of patient's allergies indicates:   Allergen Reactions    Aspirin Nausea Only     Stomach Pain  Stomach Pain      Lisinopril Other (See Comments)     Cough  Cough      Penicillin g potassium Rash     Rash  Rash       Current Outpatient Medications on File Prior to Visit   Medication Sig Dispense Refill    losartan (COZAAR) 100 MG tablet Take 100 mg by mouth once daily.  5    metoprolol tartrate (LOPRESSOR) 50 MG tablet Take 1 tablet by mouth twice daily 180 tablet 0    [DISCONTINUED] ELIQUIS 5 mg  "Tab Take 1 tablet by mouth twice daily 180 tablet 0     No current facility-administered medications on file prior to visit.      History reviewed. No pertinent past medical history.  Past Surgical History:   Procedure Laterality Date    HYSTERECTOMY       Family History   Problem Relation Age of Onset    Seizures Sister     Breast cancer Mother      Social History     Tobacco Use    Smoking status: Never Smoker    Smokeless tobacco: Never Used   Substance Use Topics    Alcohol use: Not Currently    Drug use: Never      Health Maintenance Topics with due status: Not Due       Topic Last Completion Date    Lipid Panel 05/05/2020    Colorectal Cancer Screening 06/01/2020    Mammogram 06/29/2020     Immunization History   Administered Date(s) Administered    Tdap 12/29/2020       Review of Systems   Constitutional: Negative for activity change, fatigue and unexpected weight change.   HENT: Negative for hearing loss, postnasal drip, sinus pressure, sore throat and voice change.    Eyes: Negative for photophobia and visual disturbance.   Respiratory: Negative for cough, shortness of breath and wheezing.    Cardiovascular: Negative for chest pain and palpitations.   Gastrointestinal: Negative for constipation, diarrhea and nausea.   Genitourinary: Negative for difficulty urinating, frequency, hematuria and urgency.   Musculoskeletal: Negative for arthralgias and back pain.   Skin: Negative for rash.   Neurological: Negative for weakness, light-headedness and headaches.   Hematological: Negative for adenopathy. Does not bruise/bleed easily.   Psychiatric/Behavioral: The patient is not nervous/anxious.       OBJECTIVE:      Vitals:    12/29/20 0951   BP: 126/80   Pulse: 60   Weight: 105.7 kg (233 lb)   Height: 5' 1" (1.549 m)     Physical Exam  Vitals signs reviewed.   Constitutional:       General: She is not in acute distress.     Appearance: Normal appearance. She is well-developed. She is obese.   HENT:      " Head: Normocephalic and atraumatic.      Right Ear: External ear normal.      Left Ear: External ear normal.      Nose: Nose normal.      Mouth/Throat:      Mouth: Mucous membranes are moist.   Eyes:      General: Lids are normal.      Conjunctiva/sclera: Conjunctivae normal.      Pupils: Pupils are equal, round, and reactive to light.   Neck:      Musculoskeletal: Full passive range of motion without pain and neck supple.      Thyroid: No thyromegaly.      Vascular: No JVD.      Trachea: No tracheal deviation.   Cardiovascular:      Rate and Rhythm: Normal rate and regular rhythm.      Chest Wall: PMI is not displaced.      Pulses: Normal pulses.      Heart sounds: Normal heart sounds.   Pulmonary:      Effort: Pulmonary effort is normal.      Breath sounds: Normal breath sounds.   Abdominal:      General: Bowel sounds are normal.      Palpations: Abdomen is soft.      Tenderness: There is no abdominal tenderness. There is no guarding or rebound.   Musculoskeletal: Normal range of motion.         General: No tenderness.   Skin:     General: Skin is warm and dry.      Findings: No rash.   Neurological:      General: No focal deficit present.      Mental Status: She is alert and oriented to person, place, and time.   Psychiatric:         Mood and Affect: Mood normal.         Behavior: Behavior normal.        Assessment:       1. Annual physical exam    2. Essential hypertension    3. Paroxysmal atrial fibrillation    4. Diverticulosis of colon    5. History of lower GI bleeding    6. Chronic idiopathic constipation    7. Need for tetanus, diphtheria, and acellular pertussis (Tdap) vaccine        Plan:       Annual physical exam    Essential hypertension  -     Lipid Panel; Future; Expected date: 12/29/2020  -     Microalbumin/Creatinine Ratio, Urine; Future; Expected date: 12/29/2020  -     Comprehensive Metabolic Panel; Future; Expected date: 12/29/2020    Paroxysmal atrial fibrillation  -     TSH w/reflex to FT4;  Future; Expected date: 12/29/2020    Diverticulosis of colon    History of lower GI bleeding  -     CBC Auto Differential; Future; Expected date: 12/29/2020    Chronic idiopathic constipation  -     psyllium husk (METAMUCIL) 3.4 gram/5.4 gram Powd; Take 1 Scoop by mouth Daily.  Dispense: 283 g; Refill: 0    Need for tetanus, diphtheria, and acellular pertussis (Tdap) vaccine  -     Tdap Vaccine        Counseled on age and gender appropriate medical preventative services, including cancer screenings, immunizations, overall nutritional health, need for a consistent exercise regimen and an overall push towards maintaining a vigorous and active lifestyle.      Follow up in about 6 months (around 6/29/2021) for HTN.

## 2020-12-29 NOTE — PATIENT INSTRUCTIONS
Discuss Eliquis with heart doctor.Bleeding most likely due to diverticulosis.  Add Metamucil daily to diet    High-Fiber Diet  Fiber is in fruits, vegetables, cereals, and grains. Fiber passes through your body undigested. A high-fiber diet helps food move through your intestinal tract. The added bulk is helpful in preventing constipation. In people with diverticulosis, fiber helps clean out the pouches along the colon wall. It also prevents new pouches from forming. A high-fiber diet reduces the risk of colon cancer. It also lowers blood cholesterol and prevents high blood sugar in people with diabetes.    The fiber-rich foods listed below should be part of your diet. If you are not used to high-fiber foods, start with 1 or 2 foods from this list. Every 3 to 4 days add a new one to your diet. Do this until you are eating 4 high-fiber foods per day. This should give you 20 to 35 grams of fiber a day. It is also important to drink a lot of water when you are on this diet. You should have 6 to 8 glasses of water a day. Water makes the fiber swell and increases the benefit.  Foods high in dietary fiber  The following foods are high in dietary fiber:  · Breads. Breads made with 100% whole-wheat flour; tahira, wheat, or rye crackers; whole-grain tortillas, bran muffins.  · Cereals. Whole-grain and bran cereals with bran (shredded wheat, wheat flakes, raisin bran, corn bran); oatmeal, rolled oats, granola, and brown rice.  · Fruits. Fresh fruits and their edible skins (pears, prunes, raisins, berries, apples, and apricots); bananas, citrus fruit, mangoes, pineapple; and prune juice.  · Vegetables. Best served raw or lightly cooked. All types, especially: green peas, celery, eggplant, potatoes, spinach, broccoli, Andrews sprouts, winter squash, carrots, cauliflower, soybeans, lentils, and fresh and dried beans of all kinds.  Date Last Reviewed: 8/1/2016  © 4281-4988 [employer]. [address] [place] [address],  [place] [address]. All rights reserved. This information is not intended as a substitute for professional medical care. Always follow your healthcare professional's instructions.

## 2020-12-30 LAB
ALBUMIN SERPL-MCNC: 4.1 G/DL (ref 3.6–5.1)
ALBUMIN/CREAT UR: 14 MCG/MG CREAT
ALBUMIN/GLOB SERPL: 1.1 (CALC) (ref 1–2.5)
ALP SERPL-CCNC: 78 U/L (ref 37–153)
ALT SERPL-CCNC: 11 U/L (ref 6–29)
AST SERPL-CCNC: 12 U/L (ref 10–35)
BASOPHILS # BLD AUTO: 20 CELLS/UL (ref 0–200)
BASOPHILS NFR BLD AUTO: 0.4 %
BILIRUB SERPL-MCNC: 0.9 MG/DL (ref 0.2–1.2)
BUN SERPL-MCNC: 9 MG/DL (ref 7–25)
BUN/CREAT SERPL: NORMAL (CALC) (ref 6–22)
CALCIUM SERPL-MCNC: 10.4 MG/DL (ref 8.6–10.4)
CHLORIDE SERPL-SCNC: 105 MMOL/L (ref 98–110)
CHOLEST SERPL-MCNC: 197 MG/DL
CHOLEST/HDLC SERPL: 3.5 (CALC)
CO2 SERPL-SCNC: 29 MMOL/L (ref 20–32)
CREAT SERPL-MCNC: 0.59 MG/DL (ref 0.5–0.99)
CREAT UR-MCNC: 85 MG/DL (ref 20–275)
EOSINOPHIL # BLD AUTO: 30 CELLS/UL (ref 15–500)
EOSINOPHIL NFR BLD AUTO: 0.6 %
ERYTHROCYTE [DISTWIDTH] IN BLOOD BY AUTOMATED COUNT: 12.7 % (ref 11–15)
GFRSERPLBLD MDRD-ARVRAT: 98 ML/MIN/1.73M2
GLOBULIN SER CALC-MCNC: 3.7 G/DL (CALC) (ref 1.9–3.7)
GLUCOSE SERPL-MCNC: 91 MG/DL (ref 65–99)
HCT VFR BLD AUTO: 38.4 % (ref 35–45)
HDLC SERPL-MCNC: 57 MG/DL
HGB BLD-MCNC: 12.1 G/DL (ref 11.7–15.5)
LDLC SERPL CALC-MCNC: 121 MG/DL (CALC)
LYMPHOCYTES # BLD AUTO: 2060 CELLS/UL (ref 850–3900)
LYMPHOCYTES NFR BLD AUTO: 41.2 %
MCH RBC QN AUTO: 30 PG (ref 27–33)
MCHC RBC AUTO-ENTMCNC: 31.5 G/DL (ref 32–36)
MCV RBC AUTO: 95 FL (ref 80–100)
MICROALBUMIN UR-MCNC: 1.2 MG/DL
MONOCYTES # BLD AUTO: 445 CELLS/UL (ref 200–950)
MONOCYTES NFR BLD AUTO: 8.9 %
NEUTROPHILS # BLD AUTO: 2445 CELLS/UL (ref 1500–7800)
NEUTROPHILS NFR BLD AUTO: 48.9 %
NONHDLC SERPL-MCNC: 140 MG/DL (CALC)
PLATELET # BLD AUTO: 238 THOUSAND/UL (ref 140–400)
PMV BLD REES-ECKER: 11 FL (ref 7.5–12.5)
POTASSIUM SERPL-SCNC: 4 MMOL/L (ref 3.5–5.3)
PROT SERPL-MCNC: 7.8 G/DL (ref 6.1–8.1)
RBC # BLD AUTO: 4.04 MILLION/UL (ref 3.8–5.1)
SODIUM SERPL-SCNC: 138 MMOL/L (ref 135–146)
TRIGL SERPL-MCNC: 90 MG/DL
TSH SERPL-ACNC: 2.23 MIU/L (ref 0.4–4.5)
WBC # BLD AUTO: 5 THOUSAND/UL (ref 3.8–10.8)

## 2021-01-25 ENCOUNTER — TELEPHONE (OUTPATIENT)
Dept: FAMILY MEDICINE | Facility: CLINIC | Age: 61
End: 2021-01-25

## 2021-01-25 DIAGNOSIS — R31.9 HEMATURIA, UNSPECIFIED TYPE: ICD-10-CM

## 2021-01-25 DIAGNOSIS — R35.0 FREQUENT URINATION: Primary | ICD-10-CM

## 2021-01-28 LAB
APPEARANCE UR: CLEAR
BACTERIA #/AREA URNS HPF: NORMAL /HPF
BACTERIA UR CULT: NORMAL
BILIRUB UR QL STRIP: NEGATIVE
COLOR UR: YELLOW
GLUCOSE UR QL STRIP: NEGATIVE
HGB UR QL STRIP: NEGATIVE
HYALINE CASTS #/AREA URNS LPF: NORMAL /LPF
KETONES UR QL STRIP: NEGATIVE
LEUKOCYTE ESTERASE UR QL STRIP: NEGATIVE
NITRITE UR QL STRIP: NEGATIVE
PH UR STRIP: 7.5 [PH] (ref 5–8)
PROT UR QL STRIP: NEGATIVE
RBC #/AREA URNS HPF: NORMAL /HPF
SP GR UR STRIP: 1.02 (ref 1–1.03)
SQUAMOUS #/AREA URNS HPF: NORMAL /HPF
WBC #/AREA URNS HPF: NORMAL /HPF

## 2021-04-12 ENCOUNTER — HOSPITAL ENCOUNTER (EMERGENCY)
Facility: HOSPITAL | Age: 61
Discharge: HOME | End: 2021-04-12
Attending: EMERGENCY MEDICINE
Payer: COMMERCIAL

## 2021-04-12 VITALS
WEIGHT: 245 LBS | DIASTOLIC BLOOD PRESSURE: 98 MMHG | RESPIRATION RATE: 16 BRPM | HEART RATE: 91 BPM | TEMPERATURE: 98 F | OXYGEN SATURATION: 97 % | BODY MASS INDEX: 46.26 KG/M2 | HEIGHT: 61 IN | SYSTOLIC BLOOD PRESSURE: 192 MMHG

## 2021-04-12 DIAGNOSIS — R07.89 CHEST WALL PAIN: ICD-10-CM
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
## 2021-04-12 DIAGNOSIS — M54.9 BACK PAIN: ICD-10-CM

## 2021-04-12 DIAGNOSIS — S16.1XXA CERVICAL STRAIN, ACUTE, INITIAL ENCOUNTER: ICD-10-CM

## 2021-04-12 DIAGNOSIS — S39.012A BACK STRAIN, INITIAL ENCOUNTER: ICD-10-CM

## 2021-04-12 LAB
ALBUMIN SERPL BCP-MCNC: 3.8 G/DL (ref 3.5–5.2)
ALP SERPL-CCNC: 106 U/L (ref 55–135)
ALT SERPL W/O P-5'-P-CCNC: 12 U/L (ref 10–44)
ANION GAP SERPL CALC-SCNC: 5 MMOL/L (ref 8–16)
AST SERPL-CCNC: 13 U/L (ref 10–40)
BASOPHILS # BLD AUTO: 0.01 K/UL (ref 0–0.2)
BASOPHILS NFR BLD: 0.2 % (ref 0–1.9)
BILIRUB SERPL-MCNC: 0.6 MG/DL (ref 0.1–1)
BUN SERPL-MCNC: 13 MG/DL (ref 8–23)
CALCIUM SERPL-MCNC: 10.6 MG/DL (ref 8.7–10.5)
CHLORIDE SERPL-SCNC: 107 MMOL/L (ref 95–110)
CO2 SERPL-SCNC: 26 MMOL/L (ref 23–29)
CREAT SERPL-MCNC: 0.8 MG/DL (ref 0.5–1.4)
DIFFERENTIAL METHOD: ABNORMAL
EOSINOPHIL # BLD AUTO: 0 K/UL (ref 0–0.5)
EOSINOPHIL NFR BLD: 0.6 % (ref 0–8)
ERYTHROCYTE [DISTWIDTH] IN BLOOD BY AUTOMATED COUNT: 12.7 % (ref 11.5–14.5)
EST. GFR  (AFRICAN AMERICAN): >60 ML/MIN/1.73 M^2
EST. GFR  (NON AFRICAN AMERICAN): >60 ML/MIN/1.73 M^2
GLUCOSE SERPL-MCNC: 145 MG/DL (ref 70–110)
HCT VFR BLD AUTO: 39.7 % (ref 37–48.5)
HGB BLD-MCNC: 12.7 G/DL (ref 12–16)
IMM GRANULOCYTES # BLD AUTO: 0.02 K/UL (ref 0–0.04)
IMM GRANULOCYTES NFR BLD AUTO: 0.4 % (ref 0–0.5)
LYMPHOCYTES # BLD AUTO: 1.6 K/UL (ref 1–4.8)
LYMPHOCYTES NFR BLD: 29.1 % (ref 18–48)
MCH RBC QN AUTO: 31.7 PG (ref 27–31)
MCHC RBC AUTO-ENTMCNC: 32 G/DL (ref 32–36)
MCV RBC AUTO: 99 FL (ref 82–98)
MONOCYTES # BLD AUTO: 0.5 K/UL (ref 0.3–1)
MONOCYTES NFR BLD: 8.4 % (ref 4–15)
NEUTROPHILS # BLD AUTO: 3.3 K/UL (ref 1.8–7.7)
NEUTROPHILS NFR BLD: 61.3 % (ref 38–73)
NRBC BLD-RTO: 0 /100 WBC
PLATELET # BLD AUTO: 208 K/UL (ref 150–450)
PMV BLD AUTO: 11.3 FL (ref 9.2–12.9)
POTASSIUM SERPL-SCNC: 3.5 MMOL/L (ref 3.5–5.1)
PROT SERPL-MCNC: 8.4 G/DL (ref 6–8.4)
RBC # BLD AUTO: 4.01 M/UL (ref 4–5.4)
SODIUM SERPL-SCNC: 138 MMOL/L (ref 136–145)
WBC # BLD AUTO: 5.36 K/UL (ref 3.9–12.7)

## 2021-04-12 PROCEDURE — 25000003 PHARM REV CODE 250: Performed by: EMERGENCY MEDICINE

## 2021-04-12 PROCEDURE — 36415 COLL VENOUS BLD VENIPUNCTURE: CPT | Performed by: EMERGENCY MEDICINE

## 2021-04-12 PROCEDURE — 80053 COMPREHEN METABOLIC PANEL: CPT | Performed by: EMERGENCY MEDICINE

## 2021-04-12 PROCEDURE — 99284 EMERGENCY DEPT VISIT MOD MDM: CPT | Mod: 25

## 2021-04-12 PROCEDURE — 85025 COMPLETE CBC W/AUTO DIFF WBC: CPT | Performed by: EMERGENCY MEDICINE

## 2021-04-12 PROCEDURE — 25500020 PHARM REV CODE 255

## 2021-04-12 RX ORDER — METHOCARBAMOL 500 MG/1
1000 TABLET, FILM COATED ORAL
Status: COMPLETED | OUTPATIENT
Start: 2021-04-12 | End: 2021-04-12

## 2021-04-12 RX ORDER — ACETAMINOPHEN 500 MG
1000 TABLET ORAL
Status: COMPLETED | OUTPATIENT
Start: 2021-04-12 | End: 2021-04-12

## 2021-04-12 RX ORDER — DEXTROMETHORPHAN HYDROBROMIDE, GUAIFENESIN 5; 100 MG/5ML; MG/5ML
650 LIQUID ORAL EVERY 8 HOURS
Qty: 30 TABLET | Refills: 0 | Status: SHIPPED | OUTPATIENT
Start: 2021-04-12

## 2021-04-12 RX ORDER — METHOCARBAMOL 500 MG/1
1000 TABLET, FILM COATED ORAL 3 TIMES DAILY
Qty: 30 TABLET | Refills: 0 | Status: SHIPPED | OUTPATIENT
Start: 2021-04-12 | End: 2021-04-17

## 2021-04-12 RX ADMIN — METHOCARBAMOL 1000 MG: 500 TABLET ORAL at 04:04

## 2021-04-12 RX ADMIN — IOHEXOL 100 ML: 350 INJECTION, SOLUTION INTRAVENOUS at 05:04

## 2021-04-12 RX ADMIN — ACETAMINOPHEN 1000 MG: 500 TABLET ORAL at 04:04

## 2021-04-19 ENCOUNTER — TELEPHONE (OUTPATIENT)
Dept: FAMILY MEDICINE | Facility: CLINIC | Age: 61
End: 2021-04-19

## 2021-04-19 DIAGNOSIS — I10 ESSENTIAL HYPERTENSION: Primary | ICD-10-CM

## 2021-04-19 RX ORDER — LOSARTAN POTASSIUM 100 MG/1
100 TABLET ORAL DAILY
Qty: 30 TABLET | Refills: 5 | Status: SHIPPED | OUTPATIENT
Start: 2021-04-19

## 2021-04-20 ENCOUNTER — TELEPHONE (OUTPATIENT)
Dept: FAMILY MEDICINE | Facility: CLINIC | Age: 61
End: 2021-04-20

## 2021-07-26 DIAGNOSIS — Z12.31 SCREENING MAMMOGRAM FOR HIGH-RISK PATIENT: Primary | ICD-10-CM

## 2021-10-05 DIAGNOSIS — M25.569 CHRONIC KNEE PAIN: Primary | ICD-10-CM

## 2021-10-05 DIAGNOSIS — M25.562 LEFT KNEE PAIN: ICD-10-CM

## 2021-10-05 DIAGNOSIS — G89.29 CHRONIC KNEE PAIN: Primary | ICD-10-CM

## 2021-10-21 ENCOUNTER — HOSPITAL ENCOUNTER (OUTPATIENT)
Dept: RADIOLOGY | Facility: HOSPITAL | Age: 61
Discharge: HOME | End: 2021-10-21
Attending: FAMILY MEDICINE
Payer: COMMERCIAL

## 2021-10-21 DIAGNOSIS — G89.29 CHRONIC KNEE PAIN: ICD-10-CM

## 2021-10-21 DIAGNOSIS — M25.562 LEFT KNEE PAIN: ICD-10-CM

## 2021-10-21 DIAGNOSIS — M25.569 CHRONIC KNEE PAIN: ICD-10-CM

## 2021-10-21 PROCEDURE — 73562 X-RAY EXAM OF KNEE 3: CPT | Mod: TC,PO,LT

## 2023-01-05 DIAGNOSIS — Z12.31 ENCOUNTER FOR SCREENING MAMMOGRAM FOR MALIGNANT NEOPLASM OF BREAST: Primary | ICD-10-CM

## 2023-01-20 ENCOUNTER — HOSPITAL ENCOUNTER (OUTPATIENT)
Dept: RADIOLOGY | Facility: HOSPITAL | Age: 63
Discharge: HOME | End: 2023-01-20
Attending: FAMILY MEDICINE
Payer: COMMERCIAL

## 2023-01-20 DIAGNOSIS — Z12.31 ENCOUNTER FOR SCREENING MAMMOGRAM FOR MALIGNANT NEOPLASM OF BREAST: ICD-10-CM

## 2023-01-20 PROCEDURE — 77067 SCR MAMMO BI INCL CAD: CPT | Mod: TC,PO

## 2023-03-17 ENCOUNTER — HOSPITAL ENCOUNTER (EMERGENCY)
Facility: HOSPITAL | Age: 63
Discharge: HOME | End: 2023-03-17
Attending: EMERGENCY MEDICINE
Payer: COMMERCIAL

## 2023-03-17 VITALS
WEIGHT: 245 LBS | BODY MASS INDEX: 46.26 KG/M2 | SYSTOLIC BLOOD PRESSURE: 145 MMHG | OXYGEN SATURATION: 96 % | TEMPERATURE: 99 F | DIASTOLIC BLOOD PRESSURE: 68 MMHG | HEART RATE: 84 BPM | RESPIRATION RATE: 18 BRPM | HEIGHT: 61 IN

## 2023-03-17 DIAGNOSIS — U07.1 COVID-19 VIRUS INFECTION: Primary | ICD-10-CM

## 2023-03-17 DIAGNOSIS — U07.1 COVID-19 VIRUS DETECTED: ICD-10-CM

## 2023-03-17 DIAGNOSIS — R11.0 NAUSEA: ICD-10-CM

## 2023-03-17 DIAGNOSIS — R53.81 MALAISE: ICD-10-CM

## 2023-03-17 LAB
ALBUMIN SERPL BCP-MCNC: 4.1 G/DL (ref 3.5–5.2)
ALP SERPL-CCNC: 100 U/L (ref 55–135)
ALT SERPL W/O P-5'-P-CCNC: 14 U/L (ref 10–44)
ANION GAP SERPL CALC-SCNC: 7 MMOL/L (ref 8–16)
AST SERPL-CCNC: 15 U/L (ref 10–40)
BASOPHILS # BLD AUTO: 0.02 K/UL (ref 0–0.2)
BASOPHILS NFR BLD: 0.4 % (ref 0–1.9)
BILIRUB SERPL-MCNC: 0.5 MG/DL (ref 0.1–1)
BUN SERPL-MCNC: 10 MG/DL (ref 8–23)
CALCIUM SERPL-MCNC: 10.8 MG/DL (ref 8.7–10.5)
CHLORIDE SERPL-SCNC: 106 MMOL/L (ref 95–110)
CO2 SERPL-SCNC: 24 MMOL/L (ref 23–29)
CREAT SERPL-MCNC: 0.6 MG/DL (ref 0.5–1.4)
DIFFERENTIAL METHOD: ABNORMAL
EOSINOPHIL # BLD AUTO: 0 K/UL (ref 0–0.5)
EOSINOPHIL NFR BLD: 0.2 % (ref 0–8)
ERYTHROCYTE [DISTWIDTH] IN BLOOD BY AUTOMATED COUNT: 12.6 % (ref 11.5–14.5)
EST. GFR  (NO RACE VARIABLE): >60 ML/MIN/1.73 M^2
GLUCOSE SERPL-MCNC: 124 MG/DL (ref 70–110)
HCT VFR BLD AUTO: 40.7 % (ref 37–48.5)
HGB BLD-MCNC: 12.9 G/DL (ref 12–16)
IMM GRANULOCYTES # BLD AUTO: 0.03 K/UL (ref 0–0.04)
IMM GRANULOCYTES NFR BLD AUTO: 0.6 % (ref 0–0.5)
LIPASE SERPL-CCNC: 22 U/L (ref 4–60)
LYMPHOCYTES # BLD AUTO: 0.4 K/UL (ref 1–4.8)
LYMPHOCYTES NFR BLD: 7.8 % (ref 18–48)
MAGNESIUM SERPL-MCNC: 1.8 MG/DL (ref 1.6–2.6)
MCH RBC QN AUTO: 30.9 PG (ref 27–31)
MCHC RBC AUTO-ENTMCNC: 31.7 G/DL (ref 32–36)
MCV RBC AUTO: 97 FL (ref 82–98)
MONOCYTES # BLD AUTO: 0.4 K/UL (ref 0.3–1)
MONOCYTES NFR BLD: 8.6 % (ref 4–15)
NEUTROPHILS # BLD AUTO: 4.1 K/UL (ref 1.8–7.7)
NEUTROPHILS NFR BLD: 82.4 % (ref 38–73)
NRBC BLD-RTO: 0 /100 WBC
PLATELET # BLD AUTO: 210 K/UL (ref 150–450)
PMV BLD AUTO: 10.9 FL (ref 9.2–12.9)
POTASSIUM SERPL-SCNC: 4.2 MMOL/L (ref 3.5–5.1)
PROT SERPL-MCNC: 8.2 G/DL (ref 6–8.4)
RBC # BLD AUTO: 4.18 M/UL (ref 4–5.4)
SARS-COV-2 RDRP RESP QL NAA+PROBE: POSITIVE
SODIUM SERPL-SCNC: 137 MMOL/L (ref 136–145)
TROPONIN I SERPL HS-MCNC: 5.8 PG/ML (ref 0–14.9)
WBC # BLD AUTO: 5.01 K/UL (ref 3.9–12.7)

## 2023-03-17 PROCEDURE — 84484 ASSAY OF TROPONIN QUANT: CPT | Performed by: EMERGENCY MEDICINE

## 2023-03-17 PROCEDURE — 96375 TX/PRO/DX INJ NEW DRUG ADDON: CPT

## 2023-03-17 PROCEDURE — 85025 COMPLETE CBC W/AUTO DIFF WBC: CPT | Performed by: EMERGENCY MEDICINE

## 2023-03-17 PROCEDURE — 25000003 PHARM REV CODE 250: Performed by: EMERGENCY MEDICINE

## 2023-03-17 PROCEDURE — 83690 ASSAY OF LIPASE: CPT | Performed by: EMERGENCY MEDICINE

## 2023-03-17 PROCEDURE — 93010 ELECTROCARDIOGRAM REPORT: CPT | Mod: ,,, | Performed by: SPECIALIST

## 2023-03-17 PROCEDURE — 99285 EMERGENCY DEPT VISIT HI MDM: CPT | Mod: 25

## 2023-03-17 PROCEDURE — 93005 ELECTROCARDIOGRAM TRACING: CPT | Performed by: SPECIALIST

## 2023-03-17 PROCEDURE — 96361 HYDRATE IV INFUSION ADD-ON: CPT | Mod: GZ

## 2023-03-17 PROCEDURE — U0002 COVID-19 LAB TEST NON-CDC: HCPCS | Performed by: EMERGENCY MEDICINE

## 2023-03-17 PROCEDURE — 96374 THER/PROPH/DIAG INJ IV PUSH: CPT

## 2023-03-17 PROCEDURE — 83735 ASSAY OF MAGNESIUM: CPT | Performed by: EMERGENCY MEDICINE

## 2023-03-17 PROCEDURE — 80053 COMPREHEN METABOLIC PANEL: CPT | Performed by: EMERGENCY MEDICINE

## 2023-03-17 PROCEDURE — 63600175 PHARM REV CODE 636 W HCPCS: Performed by: EMERGENCY MEDICINE

## 2023-03-17 PROCEDURE — 93010 EKG 12-LEAD: ICD-10-PCS | Mod: ,,, | Performed by: SPECIALIST

## 2023-03-17 RX ORDER — ONDANSETRON 4 MG/1
4 TABLET, FILM COATED ORAL EVERY 8 HOURS PRN
Qty: 15 TABLET | Refills: 0 | Status: SHIPPED | OUTPATIENT
Start: 2023-03-17 | End: 2024-02-20

## 2023-03-17 RX ORDER — ACETAMINOPHEN 500 MG
1000 TABLET ORAL
Status: COMPLETED | OUTPATIENT
Start: 2023-03-17 | End: 2023-03-17

## 2023-03-17 RX ORDER — ONDANSETRON 2 MG/ML
4 INJECTION INTRAMUSCULAR; INTRAVENOUS
Status: COMPLETED | OUTPATIENT
Start: 2023-03-17 | End: 2023-03-17

## 2023-03-17 RX ORDER — METOCLOPRAMIDE HYDROCHLORIDE 5 MG/ML
10 INJECTION INTRAMUSCULAR; INTRAVENOUS
Status: COMPLETED | OUTPATIENT
Start: 2023-03-17 | End: 2023-03-17

## 2023-03-17 RX ORDER — DIPHENHYDRAMINE HYDROCHLORIDE 50 MG/ML
25 INJECTION INTRAMUSCULAR; INTRAVENOUS
Status: COMPLETED | OUTPATIENT
Start: 2023-03-17 | End: 2023-03-17

## 2023-03-17 RX ORDER — ALBUTEROL SULFATE 90 UG/1
1-2 AEROSOL, METERED RESPIRATORY (INHALATION) EVERY 6 HOURS PRN
Qty: 18 G | Refills: 0 | Status: SHIPPED | OUTPATIENT
Start: 2023-03-17

## 2023-03-17 RX ADMIN — DIPHENHYDRAMINE HYDROCHLORIDE 25 MG: 50 INJECTION INTRAMUSCULAR; INTRAVENOUS at 06:03

## 2023-03-17 RX ADMIN — ONDANSETRON 4 MG: 2 INJECTION INTRAMUSCULAR; INTRAVENOUS at 07:03

## 2023-03-17 RX ADMIN — ACETAMINOPHEN 1000 MG: 500 TABLET ORAL at 07:03

## 2023-03-17 RX ADMIN — METOCLOPRAMIDE 10 MG: 5 INJECTION, SOLUTION INTRAMUSCULAR; INTRAVENOUS at 06:03

## 2023-03-17 RX ADMIN — SODIUM CHLORIDE 500 ML: 0.9 INJECTION, SOLUTION INTRAVENOUS at 06:03

## 2023-03-18 NOTE — DISCHARGE INSTRUCTIONS
RETURN TO EMERGENCY DEPARTMENT WITHOUT FAIL, IF YOUR SYMPTOMS WORSEN, IF YOU GET NEW OR DIFFERENT SYMPTOMS, IF YOU ARE UNABLE TO FOLLOW UP AS DIRECTED, OR IF YOU HAVE ANY CONCERNS OR WORRIES.    Follow-up with primary care provider on outpatient basis.

## 2023-03-18 NOTE — ED PROVIDER NOTES
Encounter Date: 3/17/2023       History     Chief Complaint   Patient presents with    Nausea    Weakness     X 1 day     63-year-old female with a past medical history of high blood pressure, presents emergency department with malaise, fatigue, nausea, headache, not feeling well.  She says that she feels generally weak and nauseous.  She says that she has a headache which started earlier today global not the worst of her life, not sudden onset.  She has had some nausea but no vomiting.  She feels weak and fatigued and no energy.  She says that she has been dragging her right leg for the past several months as well and mentioned this in review of systems.  She denies any slurred speech denies any facial droop denies any weakness to her right arm or hand.  Patient denies any urinary complaints such as frequency urgency or burning.    Review of patient's allergies indicates:   Allergen Reactions    Aspirin Nausea Only     Stomach Pain  Stomach Pain      Lisinopril Other (See Comments)     Cough  Cough      Penicillin g potassium Rash     Rash  Rash       No past medical history on file.  Past Surgical History:   Procedure Laterality Date    HYSTERECTOMY       Family History   Problem Relation Age of Onset    Seizures Sister     Breast cancer Mother      Social History     Tobacco Use    Smoking status: Never    Smokeless tobacco: Never   Substance Use Topics    Alcohol use: Not Currently    Drug use: Never     Review of Systems   Constitutional:  Positive for activity change and fatigue. Negative for chills and fever.   HENT:  Negative for congestion and sore throat.    Respiratory:  Negative for shortness of breath.    Cardiovascular:  Negative for chest pain and palpitations.   Gastrointestinal:  Positive for nausea. Negative for abdominal pain and vomiting.   Genitourinary:  Negative for dysuria and flank pain.   Musculoskeletal:  Negative for back pain.   Skin:  Negative for rash.   Neurological:  Positive for  headaches. Negative for seizures and weakness.   Hematological:  Does not bruise/bleed easily.   All other systems reviewed and are negative.    Physical Exam     Initial Vitals [03/17/23 1741]   BP Pulse Resp Temp SpO2   (!) 190/95 91 16 99 °F (37.2 °C) 97 %      MAP       --         Physical Exam    Nursing note and vitals reviewed.  Constitutional: She appears well-developed and well-nourished. No distress.   HENT:   Head: Normocephalic and atraumatic.   Mouth/Throat: No oropharyngeal exudate.   Eyes: Conjunctivae and EOM are normal. Pupils are equal, round, and reactive to light.   Neck: Neck supple. No tracheal deviation present.   Cardiovascular:  Normal rate, regular rhythm, normal heart sounds and intact distal pulses.           No murmur heard.  Pulmonary/Chest: Breath sounds normal. No stridor. No respiratory distress. She has no wheezes. She has no rhonchi. She has no rales.   Abdominal: Abdomen is soft. She exhibits no distension. There is no abdominal tenderness. There is no rebound and no guarding.   Musculoskeletal:         General: No tenderness or edema. Normal range of motion.      Cervical back: Neck supple.     Neurological: She is alert and oriented to person, place, and time. She has normal strength. No cranial nerve deficit or sensory deficit.   Speech normal, no facial droop, 4/5 strength in the right leg, 5/5 strength in left leg, there is equal strength in the hand  bilaterally and in the upper arms bilaterally.   Skin: Skin is warm and dry. Capillary refill takes less than 2 seconds. No rash noted. No erythema. No pallor.   Psychiatric: She has a normal mood and affect. Her behavior is normal. Thought content normal.       ED Course   Procedures  Labs Reviewed   COMPREHENSIVE METABOLIC PANEL - Abnormal; Notable for the following components:       Result Value    Glucose 124 (*)     Calcium 10.8 (*)     Anion Gap 7 (*)     All other components within normal limits   CBC W/ AUTO  DIFFERENTIAL - Abnormal; Notable for the following components:    MCHC 31.7 (*)     Immature Granulocytes 0.6 (*)     Lymph # 0.4 (*)     Gran % 82.4 (*)     Lymph % 7.8 (*)     All other components within normal limits   SARS-COV-2 RNA AMPLIFICATION, QUAL - Abnormal; Notable for the following components:    SARS-CoV-2 RNA, Amplification, Qual Positive (*)     All other components within normal limits   TROPONIN I HIGH SENSITIVITY   MAGNESIUM   LIPASE   URINALYSIS, REFLEX TO URINE CULTURE          Results for orders placed or performed during the hospital encounter of 03/17/23   Comprehensive metabolic panel   Result Value Ref Range    Sodium 137 136 - 145 mmol/L    Potassium 4.2 3.5 - 5.1 mmol/L    Chloride 106 95 - 110 mmol/L    CO2 24 23 - 29 mmol/L    Glucose 124 (H) 70 - 110 mg/dL    BUN 10 8 - 23 mg/dL    Creatinine 0.6 0.5 - 1.4 mg/dL    Calcium 10.8 (H) 8.7 - 10.5 mg/dL    Total Protein 8.2 6.0 - 8.4 g/dL    Albumin 4.1 3.5 - 5.2 g/dL    Total Bilirubin 0.5 0.1 - 1.0 mg/dL    Alkaline Phosphatase 100 55 - 135 U/L    AST 15 10 - 40 U/L    ALT 14 10 - 44 U/L    Anion Gap 7 (L) 8 - 16 mmol/L    eGFR >60.0 >60 mL/min/1.73 m^2   CBC auto differential   Result Value Ref Range    WBC 5.01 3.90 - 12.70 K/uL    RBC 4.18 4.00 - 5.40 M/uL    Hemoglobin 12.9 12.0 - 16.0 g/dL    Hematocrit 40.7 37.0 - 48.5 %    MCV 97 82 - 98 fL    MCH 30.9 27.0 - 31.0 pg    MCHC 31.7 (L) 32.0 - 36.0 g/dL    RDW 12.6 11.5 - 14.5 %    Platelets 210 150 - 450 K/uL    MPV 10.9 9.2 - 12.9 fL    Immature Granulocytes 0.6 (H) 0.0 - 0.5 %    Gran # (ANC) 4.1 1.8 - 7.7 K/uL    Immature Grans (Abs) 0.03 0.00 - 0.04 K/uL    Lymph # 0.4 (L) 1.0 - 4.8 K/uL    Mono # 0.4 0.3 - 1.0 K/uL    Eos # 0.0 0.0 - 0.5 K/uL    Baso # 0.02 0.00 - 0.20 K/uL    nRBC 0 0 /100 WBC    Gran % 82.4 (H) 38.0 - 73.0 %    Lymph % 7.8 (L) 18.0 - 48.0 %    Mono % 8.6 4.0 - 15.0 %    Eosinophil % 0.2 0.0 - 8.0 %    Basophil % 0.4 0.0 - 1.9 %    Differential Method Automated     Troponin I High Sensitivity   Result Value Ref Range    Troponin I High Sensitivity 5.8 0.0 - 14.9 pg/mL   Magnesium   Result Value Ref Range    Magnesium 1.8 1.6 - 2.6 mg/dL   Lipase   Result Value Ref Range    Lipase 22 4 - 60 U/L   COVID-19 Rapid Screening   Result Value Ref Range    SARS-CoV-2 RNA, Amplification, Qual Positive (AA) Negative       Imaging Results               X-Ray Chest AP Portable (Preliminary result)  Result time 03/17/23 20:14:30   Procedure changed from X-Ray Chest 1 View     Wet Read by Cole Hernandez DO (03/17/23 20:14:30, [facility] - Emergency Dept, Emergency Medicine) Flagged as Abnormal    Atelectasis versus infiltrate right lower lobe                                     CT Head Without Contrast (Final result)  Result time 03/17/23 19:32:01      Final result by Juan Chauhan MD (03/17/23 19:32:01)                   Narrative:    EXAM DESCRIPTION: CT HEAD WITHOUT CONTRAST    CLINICAL HISTORY: 63 years Female, Headache, new or worsening (Age >= 50y)    COMPARISON: March 15, 2016.    TECHNIQUE: Images of the brain were obtained without the administration of intravenous contrast. All CT scans at this facility utilized dose modulation, iterative reconstruction, and/or weightbase dosing when appropriate to reduce the radiation dose to his low as reasonably achievable.    FINDINGS: The ventricular system is normal in size for the patient's age. No evidence of a mass lesion or hemorrhage can be seen. The calvarium appears to be intact. The visualized portions of the sinuses and mastoids are unremarkable.    IMPRESSION:  No abnormalities are seen.    Electronically signed by:  Juan Chauhan MD  3/17/2023 7:32 PM CDT Workstation: [id number]                                     Medications   metoclopramide HCl injection 10 mg (10 mg Intravenous Given 3/17/23 1823)   diphenhydrAMINE injection 25 mg (25 mg Intravenous Given 3/17/23 1823)   sodium chloride 0.9% bolus 500 mL 500 mL (0  mLs Intravenous Stopped 3/17/23 1921)   ondansetron injection 4 mg (4 mg Intravenous Given 3/17/23 1918)   acetaminophen tablet 1,000 mg (1,000 mg Oral Given 3/17/23 1918)     Medical Decision Making:   Clinical Tests:   Lab Tests: Ordered and Reviewed  Radiological Study: Ordered and Reviewed  Medical Tests: Ordered and Reviewed  ED Management:  Emergent evaluation of a 63-year-old female presents emergency department nausea, malaise fatigue, no energy no appetite.  Patient also developed a cough earlier today.  Patient tested positive for COVID in the emergency department.  Patient had a CT scan emergency department of her head she complained of headache and had initially high blood pressure and complain of dragging her leg for 3-4 months.  Patient ruled out for any obvious acute stroke or bleed or mass.  Patient tested positive for COVID emergency department.  She is not hypoxic.  She has no contraindications that I can tell to Paxlovid.  I will prescribe her PACs of it.  I will also give her an albuterol inhaler.  She is not hypoxic does not need to be admitted to the hospital for her COVID.  As far as her right leg weakness this is chronic over the last 3-4 months she needs further outpatient follow-up for this which I counseled her regarding this and she and her son agreed.  They will follow up on an outpatient basis I will give her PACs of it albuterol and she will return if symptoms change or worsen.    A dictation software program was used for this note.  Please expect some simple typographical  errors in this note.    I had a detailed discussion with the patient and/or guardian regarding: The historical points, exam findings, and diagnostic results supporting the discharge diagnosis, lab results, pertinent radiology results, and the need for outpatient follow-up, for definitive care with a family practitioner and to return to the emergency department if symptoms worsen or persist or if there are any  questions or concerns that arise at home. All questions have been answered in detail. Strict return to Emergency Department precautions have been provided              ED Course as of 03/17/23 2037   Fri Mar 17, 2023   1958 EKG 7:33 p.m. normal sinus rhythm rate of 89, right bundle branch block, left axis deviation, left ventricular hypertrophy.  No STEMI.  EKG interpreted independently by me [JR]      ED Course User Index  [JR] Cole Hernandez DO                 Clinical Impression:   Final diagnoses:  [R53.81] Malaise  [U07.1] COVID-19 virus infection (Primary)  [R11.0] Nausea        ED Disposition Condition    Discharge Stable          ED Prescriptions       Medication Sig Dispense Start Date End Date Auth. Provider    nirmatrelvir-ritonavir 300 mg (150 mg x 2)-100 mg copackaged tablets (EUA) Take 3 tablets by mouth 2 (two) times daily for 5 days. Each dose contains 2 nirmatrelvir (pink tablets) and 1 ritonavir (white tablet). Take all 3 tablets together 30 tablet 3/17/2023 3/22/2023 Cole Hernandez DO    albuterol (PROVENTIL/VENTOLIN HFA) 90 mcg/actuation inhaler Inhale 1-2 puffs into the lungs every 6 (six) hours as needed for Wheezing. Rescue 18 g 3/17/2023 -- Cole Hernandez DO    ondansetron (ZOFRAN) 4 MG tablet Take 1 tablet (4 mg total) by mouth every 8 (eight) hours as needed for Nausea. 15 tablet 3/17/2023 -- Cole Hernandez DO          Follow-up Information       Follow up With Specialties Details Why Contact Info Additional Information    Clementina Cardozo, DO Internal Medicine In 3 days  [address] [facility] [address]  [phone]       [facility] - Emergency Dept Emergency Medicine  If symptoms worsen [address] [facility] [address] [phone] 1st floor             Cole Hernandez DO  03/17/23 2037

## 2023-05-03 DIAGNOSIS — M47.16 OSTEOARTHRITIS OF LUMBAR SPINE WITH MYELOPATHY: Primary | ICD-10-CM

## 2023-05-18 ENCOUNTER — HOSPITAL ENCOUNTER (OUTPATIENT)
Dept: RADIOLOGY | Facility: HOSPITAL | Age: 63
Discharge: HOME | End: 2023-05-18
Attending: FAMILY MEDICINE
Payer: COMMERCIAL

## 2023-05-18 DIAGNOSIS — M47.16 OSTEOARTHRITIS OF LUMBAR SPINE WITH MYELOPATHY: ICD-10-CM

## 2023-05-18 PROCEDURE — 72148 MRI LUMBAR SPINE W/O DYE: CPT | Mod: TC,PO

## 2023-06-05 DIAGNOSIS — M54.12 RADICULOPATHY, CERVICAL REGION: Primary | ICD-10-CM

## 2023-06-05 DIAGNOSIS — D32.0 BENIGN NEOPLASM OF CEREBRAL MENINGES: ICD-10-CM

## 2023-07-03 ENCOUNTER — HOSPITAL ENCOUNTER (OUTPATIENT)
Dept: RADIOLOGY | Facility: HOSPITAL | Age: 63
Discharge: HOME | End: 2023-07-03
Attending: NEUROLOGICAL SURGERY
Payer: COMMERCIAL

## 2023-07-03 DIAGNOSIS — M54.12 RADICULOPATHY, CERVICAL REGION: ICD-10-CM

## 2023-07-03 DIAGNOSIS — D32.0 BENIGN NEOPLASM OF CEREBRAL MENINGES: ICD-10-CM

## 2023-07-03 PROCEDURE — 70551 MRI BRAIN STEM W/O DYE: CPT | Mod: TC,PO

## 2023-07-03 PROCEDURE — 72141 MRI NECK SPINE W/O DYE: CPT | Mod: TC,PO

## 2024-02-12 DIAGNOSIS — Z12.31 SCREENING MAMMOGRAM FOR HIGH-RISK PATIENT: Primary | ICD-10-CM

## 2024-02-20 ENCOUNTER — HOSPITAL ENCOUNTER (INPATIENT)
Facility: HOSPITAL | Age: 64
LOS: 8 days | Discharge: HOME HEALTH | DRG: 493 | End: 2024-02-28
Attending: STUDENT IN AN ORGANIZED HEALTH CARE EDUCATION/TRAINING PROGRAM | Admitting: INTERNAL MEDICINE
Payer: COMMERCIAL

## 2024-02-20 DIAGNOSIS — S42.211A CLOSED DISPLACED FRACTURE OF SURGICAL NECK OF RIGHT HUMERUS, UNSPECIFIED FRACTURE MORPHOLOGY, INITIAL ENCOUNTER: Primary | ICD-10-CM

## 2024-02-20 DIAGNOSIS — R00.0 TACHYCARDIA: ICD-10-CM

## 2024-02-20 DIAGNOSIS — I48.91 A-FIB: ICD-10-CM

## 2024-02-20 DIAGNOSIS — R07.9 CHEST PAIN: ICD-10-CM

## 2024-02-20 DIAGNOSIS — M84.621A PATHOLOGICAL FRACTURE OF RIGHT HUMERUS DUE TO OTHER DISEASE, INITIAL ENCOUNTER: ICD-10-CM

## 2024-02-20 DIAGNOSIS — M89.9 BONE LESION: ICD-10-CM

## 2024-02-20 DIAGNOSIS — I48.91 ATRIAL FIBRILLATION WITH RVR: ICD-10-CM

## 2024-02-20 PROBLEM — S42.309A HUMERUS FRACTURE: Status: ACTIVE | Noted: 2024-02-20

## 2024-02-20 PROBLEM — E87.1 HYPONATREMIA: Status: ACTIVE | Noted: 2024-02-20

## 2024-02-20 LAB
ALBUMIN SERPL BCP-MCNC: 4.1 G/DL (ref 3.5–5.2)
ALP SERPL-CCNC: 93 U/L (ref 55–135)
ALT SERPL W/O P-5'-P-CCNC: 12 U/L (ref 10–44)
ANION GAP SERPL CALC-SCNC: 3 MMOL/L (ref 8–16)
AST SERPL-CCNC: 17 U/L (ref 10–40)
BASOPHILS # BLD AUTO: 0.02 K/UL (ref 0–0.2)
BASOPHILS NFR BLD: 0.2 % (ref 0–1.9)
BILIRUB SERPL-MCNC: 0.6 MG/DL (ref 0.1–1)
BNP SERPL-MCNC: 112 PG/ML (ref 0–99)
BUN SERPL-MCNC: 10 MG/DL (ref 8–23)
CALCIUM SERPL-MCNC: 10.3 MG/DL (ref 8.7–10.5)
CHLORIDE SERPL-SCNC: 103 MMOL/L (ref 95–110)
CO2 SERPL-SCNC: 25 MMOL/L (ref 23–29)
CREAT SERPL-MCNC: 0.6 MG/DL (ref 0.5–1.4)
DIFFERENTIAL METHOD BLD: ABNORMAL
EOSINOPHIL # BLD AUTO: 0 K/UL (ref 0–0.5)
EOSINOPHIL NFR BLD: 0 % (ref 0–8)
ERYTHROCYTE [DISTWIDTH] IN BLOOD BY AUTOMATED COUNT: 12.6 % (ref 11.5–14.5)
EST. GFR  (NO RACE VARIABLE): >60 ML/MIN/1.73 M^2
GLUCOSE SERPL-MCNC: 155 MG/DL (ref 70–110)
HCT VFR BLD AUTO: 38.2 % (ref 37–48.5)
HGB BLD-MCNC: 12.4 G/DL (ref 12–16)
IMM GRANULOCYTES # BLD AUTO: 0.02 K/UL (ref 0–0.04)
IMM GRANULOCYTES NFR BLD AUTO: 0.2 % (ref 0–0.5)
LYMPHOCYTES # BLD AUTO: 0.7 K/UL (ref 1–4.8)
LYMPHOCYTES NFR BLD: 7.8 % (ref 18–48)
MAGNESIUM SERPL-MCNC: 1.8 MG/DL (ref 1.6–2.6)
MCH RBC QN AUTO: 31.8 PG (ref 27–31)
MCHC RBC AUTO-ENTMCNC: 32.5 G/DL (ref 32–36)
MCV RBC AUTO: 98 FL (ref 82–98)
MONOCYTES # BLD AUTO: 0.5 K/UL (ref 0.3–1)
MONOCYTES NFR BLD: 5.4 % (ref 4–15)
NEUTROPHILS # BLD AUTO: 7.5 K/UL (ref 1.8–7.7)
NEUTROPHILS NFR BLD: 86.4 % (ref 38–73)
NRBC BLD-RTO: 0 /100 WBC
PLATELET # BLD AUTO: 232 K/UL (ref 150–450)
PMV BLD AUTO: 10.7 FL (ref 9.2–12.9)
POTASSIUM SERPL-SCNC: 3.7 MMOL/L (ref 3.5–5.1)
PROT SERPL-MCNC: 8.1 G/DL (ref 6–8.4)
RBC # BLD AUTO: 3.9 M/UL (ref 4–5.4)
SODIUM SERPL-SCNC: 131 MMOL/L (ref 136–145)
TROPONIN I SERPL HS-MCNC: 8.4 PG/ML (ref 0–14.9)
WBC # BLD AUTO: 8.73 K/UL (ref 3.9–12.7)

## 2024-02-20 PROCEDURE — 12000002 HC ACUTE/MED SURGE SEMI-PRIVATE ROOM

## 2024-02-20 PROCEDURE — 93010 ELECTROCARDIOGRAM REPORT: CPT | Mod: 76,,, | Performed by: INTERNAL MEDICINE

## 2024-02-20 PROCEDURE — 96374 THER/PROPH/DIAG INJ IV PUSH: CPT

## 2024-02-20 PROCEDURE — 63600175 PHARM REV CODE 636 W HCPCS

## 2024-02-20 PROCEDURE — 96375 TX/PRO/DX INJ NEW DRUG ADDON: CPT

## 2024-02-20 PROCEDURE — 83735 ASSAY OF MAGNESIUM: CPT

## 2024-02-20 PROCEDURE — 85025 COMPLETE CBC W/AUTO DIFF WBC: CPT

## 2024-02-20 PROCEDURE — 93010 ELECTROCARDIOGRAM REPORT: CPT | Mod: ,,, | Performed by: INTERNAL MEDICINE

## 2024-02-20 PROCEDURE — 93005 ELECTROCARDIOGRAM TRACING: CPT | Performed by: INTERNAL MEDICINE

## 2024-02-20 PROCEDURE — 25000003 PHARM REV CODE 250: Performed by: STUDENT IN AN ORGANIZED HEALTH CARE EDUCATION/TRAINING PROGRAM

## 2024-02-20 PROCEDURE — 80053 COMPREHEN METABOLIC PANEL: CPT

## 2024-02-20 PROCEDURE — 83880 ASSAY OF NATRIURETIC PEPTIDE: CPT

## 2024-02-20 PROCEDURE — 96361 HYDRATE IV INFUSION ADD-ON: CPT

## 2024-02-20 PROCEDURE — 84484 ASSAY OF TROPONIN QUANT: CPT

## 2024-02-20 PROCEDURE — 99285 EMERGENCY DEPT VISIT HI MDM: CPT | Mod: 25

## 2024-02-20 PROCEDURE — 25000003 PHARM REV CODE 250

## 2024-02-20 RX ORDER — METOPROLOL TARTRATE 50 MG/1
50 TABLET ORAL 2 TIMES DAILY
Status: DISCONTINUED | OUTPATIENT
Start: 2024-02-21 | End: 2024-02-28 | Stop reason: HOSPADM

## 2024-02-20 RX ORDER — LANOLIN ALCOHOL/MO/W.PET/CERES
800 CREAM (GRAM) TOPICAL
Status: DISCONTINUED | OUTPATIENT
Start: 2024-02-20 | End: 2024-02-28 | Stop reason: HOSPADM

## 2024-02-20 RX ORDER — IBUPROFEN 200 MG
16 TABLET ORAL
Status: DISCONTINUED | OUTPATIENT
Start: 2024-02-20 | End: 2024-02-24

## 2024-02-20 RX ORDER — TALC
6 POWDER (GRAM) TOPICAL NIGHTLY PRN
Status: DISCONTINUED | OUTPATIENT
Start: 2024-02-20 | End: 2024-02-28 | Stop reason: HOSPADM

## 2024-02-20 RX ORDER — METOPROLOL TARTRATE 25 MG/1
25 TABLET, FILM COATED ORAL
Status: COMPLETED | OUTPATIENT
Start: 2024-02-20 | End: 2024-02-20

## 2024-02-20 RX ORDER — SODIUM,POTASSIUM PHOSPHATES 280-250MG
2 POWDER IN PACKET (EA) ORAL
Status: DISCONTINUED | OUTPATIENT
Start: 2024-02-20 | End: 2024-02-28 | Stop reason: HOSPADM

## 2024-02-20 RX ORDER — ONDANSETRON HYDROCHLORIDE 2 MG/ML
4 INJECTION, SOLUTION INTRAVENOUS
Status: COMPLETED | OUTPATIENT
Start: 2024-02-20 | End: 2024-02-20

## 2024-02-20 RX ORDER — HYDRALAZINE HYDROCHLORIDE 25 MG/1
25 TABLET, FILM COATED ORAL EVERY 8 HOURS
Status: DISCONTINUED | OUTPATIENT
Start: 2024-02-21 | End: 2024-02-28 | Stop reason: HOSPADM

## 2024-02-20 RX ORDER — HYDROCODONE BITARTRATE AND ACETAMINOPHEN 5; 325 MG/1; MG/1
1 TABLET ORAL EVERY 6 HOURS PRN
Status: DISCONTINUED | OUTPATIENT
Start: 2024-02-20 | End: 2024-02-24

## 2024-02-20 RX ORDER — MORPHINE SULFATE 2 MG/ML
1 INJECTION, SOLUTION INTRAMUSCULAR; INTRAVENOUS EVERY 6 HOURS PRN
Status: DISCONTINUED | OUTPATIENT
Start: 2024-02-20 | End: 2024-02-28 | Stop reason: HOSPADM

## 2024-02-20 RX ORDER — ONDANSETRON HYDROCHLORIDE 2 MG/ML
4 INJECTION, SOLUTION INTRAVENOUS EVERY 6 HOURS PRN
Status: DISCONTINUED | OUTPATIENT
Start: 2024-02-20 | End: 2024-02-28 | Stop reason: HOSPADM

## 2024-02-20 RX ORDER — MORPHINE SULFATE 4 MG/ML
4 INJECTION, SOLUTION INTRAMUSCULAR; INTRAVENOUS
Status: COMPLETED | OUTPATIENT
Start: 2024-02-20 | End: 2024-02-20

## 2024-02-20 RX ORDER — ALUMINUM HYDROXIDE, MAGNESIUM HYDROXIDE, AND SIMETHICONE 1200; 120; 1200 MG/30ML; MG/30ML; MG/30ML
30 SUSPENSION ORAL 4 TIMES DAILY PRN
Status: DISCONTINUED | OUTPATIENT
Start: 2024-02-20 | End: 2024-02-28 | Stop reason: HOSPADM

## 2024-02-20 RX ORDER — IBUPROFEN 200 MG
24 TABLET ORAL
Status: DISCONTINUED | OUTPATIENT
Start: 2024-02-20 | End: 2024-02-24

## 2024-02-20 RX ORDER — HYDRALAZINE HYDROCHLORIDE 25 MG/1
25 TABLET, FILM COATED ORAL EVERY 8 HOURS
COMMUNITY
Start: 2023-12-13

## 2024-02-20 RX ORDER — HYDROMORPHONE HYDROCHLORIDE 1 MG/ML
0.5 INJECTION, SOLUTION INTRAMUSCULAR; INTRAVENOUS; SUBCUTANEOUS
Status: COMPLETED | OUTPATIENT
Start: 2024-02-20 | End: 2024-02-20

## 2024-02-20 RX ORDER — NALOXONE HCL 0.4 MG/ML
0.02 VIAL (ML) INJECTION
Status: DISCONTINUED | OUTPATIENT
Start: 2024-02-20 | End: 2024-02-28 | Stop reason: HOSPADM

## 2024-02-20 RX ORDER — HYDROCHLOROTHIAZIDE 25 MG/1
25 TABLET ORAL EVERY MORNING
COMMUNITY
Start: 2023-10-16

## 2024-02-20 RX ORDER — HYDROCHLOROTHIAZIDE 25 MG/1
25 TABLET ORAL EVERY MORNING
Status: DISCONTINUED | OUTPATIENT
Start: 2024-02-21 | End: 2024-02-28 | Stop reason: HOSPADM

## 2024-02-20 RX ORDER — SODIUM CHLORIDE 0.9 % (FLUSH) 0.9 %
2 SYRINGE (ML) INJECTION EVERY 12 HOURS PRN
Status: DISCONTINUED | OUTPATIENT
Start: 2024-02-20 | End: 2024-02-28 | Stop reason: HOSPADM

## 2024-02-20 RX ORDER — CLONIDINE HYDROCHLORIDE 0.1 MG/1
0.1 TABLET ORAL EVERY 8 HOURS PRN
COMMUNITY
Start: 2024-01-17

## 2024-02-20 RX ORDER — ACETAMINOPHEN 325 MG/1
650 TABLET ORAL EVERY 4 HOURS PRN
Status: DISCONTINUED | OUTPATIENT
Start: 2024-02-20 | End: 2024-02-28 | Stop reason: HOSPADM

## 2024-02-20 RX ORDER — LOSARTAN POTASSIUM 25 MG/1
100 TABLET ORAL DAILY
Status: DISCONTINUED | OUTPATIENT
Start: 2024-02-21 | End: 2024-02-28 | Stop reason: HOSPADM

## 2024-02-20 RX ORDER — GLUCAGON 1 MG
1 KIT INJECTION
Status: DISCONTINUED | OUTPATIENT
Start: 2024-02-20 | End: 2024-02-24

## 2024-02-20 RX ORDER — ACETAMINOPHEN 325 MG/1
650 TABLET ORAL EVERY 8 HOURS PRN
Status: DISCONTINUED | OUTPATIENT
Start: 2024-02-20 | End: 2024-02-24

## 2024-02-20 RX ORDER — SODIUM CHLORIDE 9 MG/ML
INJECTION, SOLUTION INTRAVENOUS CONTINUOUS
Status: DISCONTINUED | OUTPATIENT
Start: 2024-02-20 | End: 2024-02-21

## 2024-02-20 RX ORDER — FLUTICASONE PROPIONATE 50 MCG
1 SPRAY, SUSPENSION (ML) NASAL DAILY
COMMUNITY
Start: 2024-02-08

## 2024-02-20 RX ORDER — POTASSIUM CHLORIDE 20 MEQ/1
20 TABLET, EXTENDED RELEASE ORAL DAILY PRN
COMMUNITY
Start: 2023-10-20

## 2024-02-20 RX ADMIN — ONDANSETRON 4 MG: 2 INJECTION INTRAMUSCULAR; INTRAVENOUS at 06:02

## 2024-02-20 RX ADMIN — SODIUM CHLORIDE 500 ML: 9 INJECTION, SOLUTION INTRAVENOUS at 08:02

## 2024-02-20 RX ADMIN — METOPROLOL TARTRATE 25 MG: 25 TABLET, FILM COATED ORAL at 08:02

## 2024-02-20 RX ADMIN — HYDROMORPHONE HYDROCHLORIDE 0.5 MG: 0.5 INJECTION, SOLUTION INTRAMUSCULAR; INTRAVENOUS; SUBCUTANEOUS at 08:02

## 2024-02-20 RX ADMIN — MORPHINE SULFATE 4 MG: 4 INJECTION, SOLUTION INTRAMUSCULAR; INTRAVENOUS at 06:02

## 2024-02-21 PROBLEM — E87.1 HYPONATREMIA: Status: RESOLVED | Noted: 2024-02-20 | Resolved: 2024-02-21

## 2024-02-21 PROBLEM — S42.213A CLOSED FRACTURE OF SURGICAL NECK OF HUMERUS: Status: ACTIVE | Noted: 2024-02-20

## 2024-02-21 LAB
ALBUMIN SERPL BCP-MCNC: 3.6 G/DL (ref 3.5–5.2)
ALP SERPL-CCNC: 80 U/L (ref 55–135)
ALT SERPL W/O P-5'-P-CCNC: 10 U/L (ref 10–44)
ANION GAP SERPL CALC-SCNC: 3 MMOL/L (ref 8–16)
AST SERPL-CCNC: 15 U/L (ref 10–40)
BASOPHILS # BLD AUTO: 0.02 K/UL (ref 0–0.2)
BASOPHILS NFR BLD: 0.3 % (ref 0–1.9)
BILIRUB SERPL-MCNC: 0.7 MG/DL (ref 0.1–1)
BUN SERPL-MCNC: 12 MG/DL (ref 8–23)
CALCIUM SERPL-MCNC: 10.1 MG/DL (ref 8.7–10.5)
CEA SERPL-MCNC: 1.1 NG/ML (ref 0–5)
CHLORIDE SERPL-SCNC: 106 MMOL/L (ref 95–110)
CO2 SERPL-SCNC: 26 MMOL/L (ref 23–29)
CREAT SERPL-MCNC: 0.6 MG/DL (ref 0.5–1.4)
DIFFERENTIAL METHOD BLD: ABNORMAL
EOSINOPHIL # BLD AUTO: 0 K/UL (ref 0–0.5)
EOSINOPHIL NFR BLD: 0 % (ref 0–8)
ERYTHROCYTE [DISTWIDTH] IN BLOOD BY AUTOMATED COUNT: 12.8 % (ref 11.5–14.5)
EST. GFR  (NO RACE VARIABLE): >60 ML/MIN/1.73 M^2
GLUCOSE SERPL-MCNC: 111 MG/DL (ref 70–110)
HCT VFR BLD AUTO: 34.4 % (ref 37–48.5)
HGB BLD-MCNC: 11.1 G/DL (ref 12–16)
IMM GRANULOCYTES # BLD AUTO: 0.02 K/UL (ref 0–0.04)
IMM GRANULOCYTES NFR BLD AUTO: 0.3 % (ref 0–0.5)
LYMPHOCYTES # BLD AUTO: 1.6 K/UL (ref 1–4.8)
LYMPHOCYTES NFR BLD: 23.4 % (ref 18–48)
MAGNESIUM SERPL-MCNC: 1.8 MG/DL (ref 1.6–2.6)
MCH RBC QN AUTO: 32.2 PG (ref 27–31)
MCHC RBC AUTO-ENTMCNC: 32.3 G/DL (ref 32–36)
MCV RBC AUTO: 100 FL (ref 82–98)
MONOCYTES # BLD AUTO: 0.7 K/UL (ref 0.3–1)
MONOCYTES NFR BLD: 10 % (ref 4–15)
NEUTROPHILS # BLD AUTO: 4.4 K/UL (ref 1.8–7.7)
NEUTROPHILS NFR BLD: 66 % (ref 38–73)
NRBC BLD-RTO: 0 /100 WBC
PLATELET # BLD AUTO: 212 K/UL (ref 150–450)
PMV BLD AUTO: 10.6 FL (ref 9.2–12.9)
POTASSIUM SERPL-SCNC: 4.2 MMOL/L (ref 3.5–5.1)
PROT SERPL-MCNC: 6.9 G/DL (ref 6–8.4)
RBC # BLD AUTO: 3.45 M/UL (ref 4–5.4)
SODIUM SERPL-SCNC: 135 MMOL/L (ref 136–145)
WBC # BLD AUTO: 6.68 K/UL (ref 3.9–12.7)

## 2024-02-21 PROCEDURE — 83521 IG LIGHT CHAINS FREE EACH: CPT | Mod: 59 | Performed by: INTERNAL MEDICINE

## 2024-02-21 PROCEDURE — 80053 COMPREHEN METABOLIC PANEL: CPT | Performed by: INTERNAL MEDICINE

## 2024-02-21 PROCEDURE — 84165 PROTEIN E-PHORESIS SERUM: CPT | Performed by: INTERNAL MEDICINE

## 2024-02-21 PROCEDURE — 63600175 PHARM REV CODE 636 W HCPCS: Performed by: INTERNAL MEDICINE

## 2024-02-21 PROCEDURE — 82784 ASSAY IGA/IGD/IGG/IGM EACH: CPT | Mod: 91 | Performed by: INTERNAL MEDICINE

## 2024-02-21 PROCEDURE — 83735 ASSAY OF MAGNESIUM: CPT | Performed by: INTERNAL MEDICINE

## 2024-02-21 PROCEDURE — 82232 ASSAY OF BETA-2 PROTEIN: CPT | Performed by: INTERNAL MEDICINE

## 2024-02-21 PROCEDURE — 83521 IG LIGHT CHAINS FREE EACH: CPT | Mod: 59 | Performed by: STUDENT IN AN ORGANIZED HEALTH CARE EDUCATION/TRAINING PROGRAM

## 2024-02-21 PROCEDURE — 12000002 HC ACUTE/MED SURGE SEMI-PRIVATE ROOM

## 2024-02-21 PROCEDURE — 82378 CARCINOEMBRYONIC ANTIGEN: CPT | Performed by: INTERNAL MEDICINE

## 2024-02-21 PROCEDURE — 25000003 PHARM REV CODE 250: Performed by: INTERNAL MEDICINE

## 2024-02-21 PROCEDURE — 86304 IMMUNOASSAY TUMOR CA 125: CPT | Performed by: INTERNAL MEDICINE

## 2024-02-21 PROCEDURE — 85025 COMPLETE CBC W/AUTO DIFF WBC: CPT | Performed by: INTERNAL MEDICINE

## 2024-02-21 RX ORDER — HYDROCODONE BITARTRATE AND ACETAMINOPHEN 5; 325 MG/1; MG/1
1 TABLET ORAL EVERY 6 HOURS PRN
Qty: 28 TABLET | Refills: 0 | Status: SHIPPED | OUTPATIENT
Start: 2024-02-21 | End: 2024-02-28 | Stop reason: HOSPADM

## 2024-02-21 RX ADMIN — ONDANSETRON 4 MG: 2 INJECTION INTRAMUSCULAR; INTRAVENOUS at 10:02

## 2024-02-21 RX ADMIN — MORPHINE SULFATE 1 MG: 2 INJECTION, SOLUTION INTRAMUSCULAR; INTRAVENOUS at 10:02

## 2024-02-21 RX ADMIN — MORPHINE SULFATE 1 MG: 2 INJECTION, SOLUTION INTRAMUSCULAR; INTRAVENOUS at 08:02

## 2024-02-21 RX ADMIN — SODIUM CHLORIDE: 9 INJECTION, SOLUTION INTRAVENOUS at 12:02

## 2024-02-21 RX ADMIN — METOPROLOL TARTRATE 50 MG: 50 TABLET, FILM COATED ORAL at 10:02

## 2024-02-21 RX ADMIN — HYDRALAZINE HYDROCHLORIDE 25 MG: 25 TABLET ORAL at 10:02

## 2024-02-21 NOTE — ASSESSMENT & PLAN NOTE
-Orthopedic Surgery consult, recommend sling and op follow up for surgery next week  PT/OT  -Analgesic prn  -Monitor vitals  Bone scan op and follow up with oncology as a precaution in 4 weeks

## 2024-02-21 NOTE — HOSPITAL COURSE
Paola Ibanez is a 64 year old female with a past medical history of obesity, HTN, anemia and Afib who presented with Afib with RVR with a R humerus fracture. The Afib is rate controlled on metoprolol. Cardiology has been consulted. Orthopedic Surgery was also consulted and plans to take patient to the OR 2/26. PT/OT has been consulted.  IM nailing completed to right humerus fracture on 2/26.  PT/OT recommended moderate intensity therapy likely at SNF but patient requested home health.  She will f/u with Ortho.  Home health and HME ordered.

## 2024-02-21 NOTE — PLAN OF CARE
Met with patient at bedside to complete initial assessment. Patient / family reports patient DOES NOT have a living will and NO ONE is medical POA.      [facility] - Emergency Dept  Initial Discharge Assessment       Primary Care Provider: May Rangel MD    Admission Diagnosis: Closed displaced fracture of surgical neck of right humerus, unspecified fracture morphology, initial encounter [S42.211A]    Admission Date: 2/20/2024  Expected Discharge Date:     Transition of Care Barriers: (P) None    Payor: [employer] / Plan: [employer] MEDICARE HMO / Product Type: Medicare Advantage /     Extended Emergency Contact Information  Primary Emergency Contact: DAVID CEDENO  Mobile Phone: [phone]  Relation: Son   needed? No    Discharge Plan A: (P) Home  Discharge Plan B: (P) Home      [facility] - [place] - [address]  [address]  [place] [address]  Phone: [phone] Fax: [phone]      Initial Assessment (most recent)       Adult Discharge Assessment - 02/21/24 1201          Discharge Assessment    Assessment Type Discharge Planning Assessment (P)      Confirmed/corrected address, phone number and insurance Yes (P)      Confirmed Demographics Correct on Facesheet (P)      Source of Information patient (P)      Communicated EH with patient/caregiver No (P)      Reason For Admission A-fib with RVR (P)      People in Home alone (P)      Do you expect to return to your current living situation? Yes (P)      Do you have help at home or someone to help you manage your care at home? Yes (P)      Who are your caregiver(s) and their phone number(s)? DAVID CEDENO (Son)  [phone] (Mobile) (P)      Current cognitive status: Alert/Oriented (P)      Walking or Climbing Stairs Difficulty yes (P)      Walking or Climbing Stairs ambulation difficulty, requires equipment (P)      Dressing/Bathing Difficulty no (P)      Equipment Currently Used at Home  walker, rolling (P)      Readmission within 30 days? No (P)      Patient currently being followed by outpatient case management? No (P)      Do you currently have service(s) that help you manage your care at home? No (P)      Do you have prescription coverage? Yes (P)      Coverage [employer] (P)      Who is going to help you get home at discharge? DAVID CEDENO (Son)  [phone] (Mobile) (P)      How do you get to doctors appointments? family or friend will provide (P)      Are you on dialysis? No (P)      Do you take coumadin? No (P)      Discharge Plan A Home (P)      Discharge Plan B Home (P)      DME Needed Upon Discharge  none (P)      Discharge Plan discussed with: Patient (P)      Transition of Care Barriers None (P)

## 2024-02-21 NOTE — SUBJECTIVE & OBJECTIVE
Interval History: reporting right rib pain, no fx on xray. Na improving. Rate controlled.     Review of Systems   Constitutional:  Negative for diaphoresis.   Respiratory:  Negative for choking and shortness of breath.    Cardiovascular:  Negative for chest pain.     Objective:     Vital Signs (Most Recent):  Temp: 98.7 °F (37.1 °C) (02/20/24 1751)  Pulse: 69 (02/21/24 1052)  Resp: 18 (02/21/24 1029)  BP: (!) 141/67 (02/21/24 1052)  SpO2: (!) 92 % (02/21/24 1052) Vital Signs (24h Range):  Temp:  [98.7 °F (37.1 °C)] 98.7 °F (37.1 °C)  Pulse:  [] 69  Resp:  [15-19] 18  SpO2:  [91 %-99 %] 92 %  BP: (119-172)/() 141/67     Weight: 98.9 kg (218 lb)  Body mass index is 41.19 kg/m².    Intake/Output Summary (Last 24 hours) at 2/21/2024 1721  Last data filed at 2/20/2024 2134  Gross per 24 hour   Intake 500 ml   Output --   Net 500 ml         Physical Exam  Constitutional:       Appearance: She is not ill-appearing.   Cardiovascular:      Rate and Rhythm: Normal rate and regular rhythm.   Pulmonary:      Effort: No respiratory distress.      Breath sounds: No wheezing.   Musculoskeletal:      Comments: Right arm in sling    Neurological:      General: No focal deficit present.      Mental Status: She is alert and oriented to person, place, and time.      Comments: Chronic right foot drop             Significant Labs: All pertinent labs within the past 24 hours have been reviewed.  CBC:   Recent Labs   Lab 02/20/24 1848 02/21/24  0542   WBC 8.73 6.68   HGB 12.4 11.1*   HCT 38.2 34.4*    212     CMP:   Recent Labs   Lab 02/20/24 1848 02/21/24  0542   * 135*   K 3.7 4.2    106   CO2 25 26   * 111*   BUN 10 12   CREATININE 0.6 0.6   CALCIUM 10.3 10.1   PROT 8.1 6.9   ALBUMIN 4.1 3.6   BILITOT 0.6 0.7   ALKPHOS 93 80   AST 17 15   ALT 12 10   ANIONGAP 3* 3*       Significant Imaging: I have reviewed all pertinent imaging results/findings within the past 24 hours.

## 2024-02-21 NOTE — CONSULTS
"[facility]   Hematology/Oncology  Inpatient Consult Note          Patient Name: Paola Ibanez  MRN: [id number]  Admission Date: 2/20/2024  Hospital Length of Stay: 1 days  Code Status: Full Code   Attending Provider: Libby Norton MD  Referring Provider: Lucho  Consulting Provider: Chirag Brown MD  Primary Care Physician: May Rangel MD  Principal Problem:Atrial fibrillation with RVR    Consults  Subjective:     Chief Complaint: Fall (Family found pt on floor around 1615. Pt states she fell around about 11am this morning. )          History Present Illness:  64 y.o. female previously unknown to our service who presented to ED at [facility] with right arm pain after falling at home earlier today.     Right humerus x-ray showed a fracture through the surgical neck of the humerus with cystic component in the proximal humeral diaphysis. She has been seen by Lucho FONSECA with orthopedics, and he has consulted oncology out of concerns for possible pathologic fracture.     Patient discharged to home from ED prior to being seen by myself - planned f/u with ortho. Discussed with Dr Norton and ortho has reported that it is not a "pathologic" fracture after all upon review of the MRI       Past Medical/Surgical History:  No past medical history on file.  Past Surgical History:   Procedure Laterality Date    HYSTERECTOMY           Allergies:  Review of patient's allergies indicates:   Allergen Reactions    Aspirin Nausea Only     Stomach Pain  Stomach Pain      Lisinopril Other (See Comments)     Cough  Cough      Penicillin g potassium Rash     Rash  Rash         Social/Family History:  Social History     Socioeconomic History    Marital status: Single   Tobacco Use    Smoking status: Never    Smokeless tobacco: Never   Substance and Sexual Activity    Alcohol use: Not Currently    Drug use: Never     Family History   Problem Relation Age of Onset    Seizures Sister     Breast cancer " Mother          ROS:    GEN: right arm pain  HEENT: normal with no HA's, sore throat, stiff neck, changes in vision  CV: normal with no CP, SOB, PND, SANDOVAL or orthopnea  PULM: normal with no SOB, cough, hemoptysis, sputum or pleuritic pain  GI: normal with no abdominal pain, nausea, vomiting, constipation, diarrhea, melanotic stools, BRBPR, or hematemesis  : normal with no hematuria, dysuria  BREAST: normal with no mass, discharge, pain  SKIN: normal with no rash, erythema, bruising, or swelling        Medications:  Continuous Infusions:   sodium chloride 0.9% 100 mL/hr at 02/21/24 0030     Scheduled Meds:   hydrALAZINE  25 mg Oral Q8H    hydroCHLOROthiazide  25 mg Oral QAM    losartan  100 mg Oral Daily    metoprolol tartrate  50 mg Oral BID     PRN Meds:acetaminophen, acetaminophen, aluminum-magnesium hydroxide-simethicone, dextrose 50% in water (D50W), dextrose 50% in water (D50W), glucagon (human recombinant), glucose, glucose, HYDROcodone-acetaminophen, magnesium oxide, magnesium oxide, melatonin, morphine, naloxone, ondansetron, potassium bicarbonate, potassium bicarbonate, potassium bicarbonate, potassium, sodium phosphates, potassium, sodium phosphates, potassium, sodium phosphates, sodium chloride 0.9%         Objective:       Physical Exam:    Vitals:  Blood pressure 131/65, pulse 69, temperature 98.7 °F (37.1 °C), temperature source Oral, resp. rate 18, weight 98.9 kg (218 lb), SpO2 (!) 91 %.           Lab Review:   Lab Results   Component Value Date    WBC 6.68 02/21/2024    HGB 11.1 (L) 02/21/2024    HCT 34.4 (L) 02/21/2024     (H) 02/21/2024     02/21/2024       CMP  Sodium   Date Value Ref Range Status   02/21/2024 135 (L) 136 - 145 mmol/L Final     Potassium   Date Value Ref Range Status   02/21/2024 4.2 3.5 - 5.1 mmol/L Final     Chloride   Date Value Ref Range Status   02/21/2024 106 95 - 110 mmol/L Final     CO2   Date Value Ref Range Status   02/21/2024 26 23 - 29 mmol/L Final      Glucose   Date Value Ref Range Status   02/21/2024 111 (H) 70 - 110 mg/dL Final     BUN   Date Value Ref Range Status   02/21/2024 12 8 - 23 mg/dL Final     Creatinine   Date Value Ref Range Status   02/21/2024 0.6 0.5 - 1.4 mg/dL Final     Calcium   Date Value Ref Range Status   02/21/2024 10.1 8.7 - 10.5 mg/dL Final     Total Protein   Date Value Ref Range Status   02/21/2024 6.9 6.0 - 8.4 g/dL Final     Albumin   Date Value Ref Range Status   02/21/2024 3.6 3.5 - 5.2 g/dL Final     Total Bilirubin   Date Value Ref Range Status   02/21/2024 0.7 0.1 - 1.0 mg/dL Final     Comment:     For infants and newborns, interpretation of results should be based  on gestational age, weight and in agreement with clinical  observations.    Premature Infant recommended reference ranges:  Up to 24 hours.............<8.0 mg/dL  Up to 48 hours............<12.0 mg/dL  3-5 days..................<15.0 mg/dL  6-29 days.................<15.0 mg/dL       Alkaline Phosphatase   Date Value Ref Range Status   02/21/2024 80 55 - 135 U/L Final     AST   Date Value Ref Range Status   02/21/2024 15 10 - 40 U/L Final     ALT   Date Value Ref Range Status   02/21/2024 10 10 - 44 U/L Final     Anion Gap   Date Value Ref Range Status   02/21/2024 3 (L) 8 - 16 mmol/L Final     eGFR   Date Value Ref Range Status   02/21/2024 >60.0 >60 mL/min/1.73 m^2 Final         Diagnostic Results:      X-ray Shoulder 2 or More Views Right [[id number]] Collected: 02/20/24 1906   Order Status: Completed Updated: 02/20/24 2005   Narrative:     CLINICAL HISTORY:  64 years (1960) Female fall Fall (Family found pt on floor around 1615. Pt states she fell around about 11am this morning. )    TECHNIQUE:  XR SHOULDER 2 OR MORE VIEWS RIGHT. 2 images obtained.    COMPARISON:  None available.    FINDINGS:    2 views the right shoulder reveal evidence of pathologic fracture of the humeral diaphysis extending through the surgical neck of the humerus with mild medial  deviation of the diaphysis of component. Fracture involves the greater tubercle without significant osseous destruction. The patient's upper extremities held in flexion.    IMPRESSION: Pathologic fracture through the surgical neck of humerus with involvement of greater tubercle.     X-Ray Humerus 2 View Right [[id number]] Collected: 02/20/24 1905   Order Status: Completed Updated: 02/20/24 2003   Narrative:     CLINICAL HISTORY:  64 years (1960) Female Fall (Family found pt on floor around 1615. Pt states she fell around about 11am this morning. )    TECHNIQUE:  XR HUMERUS RIGHT. 2 images obtained.    COMPARISON:  None available.    FINDINGS:    2 views right humerus reveal evidence of pathologic fracture through the surgical neck of the humerus with well demarcated intermargin suggestive of a primary cystic lesion through the surgical neck of humerus with involvement of the lesser tubercle. Humeral head maintains appropriate alignment. Marginal clockwise rotation of the humeral head component.    IMPRESSION: Pathologic fracture through the surgical neck of the humerus with cystic component in the proximal humeral diaphysis. Recommend supplement evaluation with CT scan of the upper extremity once the initial traumatic episode has resolved         XR Ribs Min 3 Views w/PA Chest Right [[id number]] Collected: 02/20/24 1906   Order Status: Completed Updated: 02/20/24 2004   Narrative:     CLINICAL HISTORY:  64 years (1960) Female fall Fall (Family found pt on floor around 1615. Pt states she fell around about 11am this morning. )    TECHNIQUE:  XR RIBS RIGHT WITH CHEST. 6 images obtained.    COMPARISON:  None available.    FINDINGS:    Anterior and posterior arches of the posterior ribs are well-maintained without evidence of an acute fracture. No evidence of traumatic episode. Normal alignment. Chronic degenerative spondylosis changes of the thoracic spine on the basis of diffuse idiopathic skeletal  hyperostosis. Pathologic fracture of the proximal humeral diaphysis through the surgical neck.    IMPRESSION:  1.  No evidence of acute traumatic injury.  2.  Pathologic fracture of the proximal humeral diaphysis through the surgical neck.             MRI Shoulder Without Contrast Right [[id number]] Collected: 02/21/24 1110   Order Status: Completed Updated: 02/21/24 1237   Narrative:     MR SHOULDER WITHOUT IV CONTRAST RIGHT    CLINICAL HISTORY:  64 years Female pathologic humerus fracture    COMPARISON: Right shoulder radiography February 20, 2024    FINDINGS:    Acute comminuted fracture involving the surgical neck of the right humerus. Mild medial and anterior displacement of the minimally angulated and impacted fracture fragments. Fracture extends through the greater tuberosity, with intact rotator cuff tendons inserting upon the greater tuberosity fracture fragment. The inferior glenohumeral ligament appears intact. Glenohumeral joint effusion. No fracture extension through the humeral head articular surface. Heterogeneous signal intensity at the fracture site consistent with hemorrhage/hematoma.    Marked subcutaneous edema of the shoulder, as well as diffuse intramuscular edema within the deltoid, likely representing contusion. There is also intramuscular edema within the teres minor muscle.    Moderate AC joint osteoarthrosis. Small foci of subchondral edema within the glenoid, likely secondary to glenohumeral osteoarthrosis.    IMPRESSION:    Acute comminuted mildly displaced/angulated fracture through the surgical neck of the right humerus, with involvement of the greater tuberosity.       Assessment/Plan:     IMPRESSION:  (1) 64 y.o. female previously unknown to our service who presented to ED at [facility] with right arm pain after falling at home earlier today.  Right humerus x-ray showed a fracture through the surgical neck of the humerus with cystic component in the proximal humeral diaphysis. She has been  "seen by Lucho FONSECA with orthopedics, and he has consulted oncology out of concerns for possible pathologic fracture.     2/21/2024:  - Patient discharged to home from ED prior to being seen by myself - planned f/u with ortho. Discussed with Dr Norton and ortho has reported that it is not a "pathologic" fracture after all upon review of the MRI       (2) Atrial fib with RVR for which she has not been taking any prophylactic anticoagulation for several years    (3) HTN    (4) Diverticulosis with hx/of lower GIB; chronic idiopathic constipation    (5) Hx/of hysterectomy    (6) Hyponatremia (mild borderline)               Active Diagnoses:    Diagnosis Date Noted POA    PRINCIPAL PROBLEM:  Atrial fibrillation with RVR [I48.91] 04/28/2016 Yes    Closed fracture of surgical neck of humerus [S42.213A] 02/20/2024 Yes    Hyponatremia [E87.1] 02/20/2024 Unknown    Hypertension [I10] 04/28/2016 Yes      Problems Resolved During this Admission:           PLAN:   Recommended bone scan and CT chest/abdom/pelvis  Ordered SPEP, UPEP, Ig panel and K/L ratio  Patient discharged to home from ED prior to being seen by myself - planned f/u with ortho.   Discussed with Dr Norton - f/u in oncology clinic in 4 weeks as a precaution           Thank you for your consult.      Chirag Brown MD  Hematology/Oncology  [facility] - Emergency Dept    "

## 2024-02-21 NOTE — PROGRESS NOTES
[facility] [place] - Emergency Dept  Hospital Medicine  Progress Note    Patient Name: Paola Ibanez  MRN: [id number]  Patient Class: IP- Inpatient   Admission Date: 2/20/2024  Length of Stay: 1 days  Attending Physician: Libby Norton MD  Primary Care Provider: May Rangel MD        Subjective:     Principal Problem:Atrial fibrillation with RVR        HPI:  This is a 63 y/o  female w/ pmh of a-fib, HTN who presents w/ right arm pain. Patient reports she was in her kitchen today and her floors are uneven and she stumbled and hit her arm on the cabinet; reports having had 9/10 pain but currently reports she's comfortable; denies headache, blurry vision, chest pain, SOB, palpitations, abdominal pain, N/V, dysuria and back pain. Patient is afebrile, tachycardic, hypertensive and saturating ok. Lab work reflects hyponatremia w/ Na of 131. Right humerus x-ray showed a pathologic fracture through the surgical neck of the humerus with cystic component in the proximal humeral diaphysis. Recommend supplement evaluation with CT scan of the upper extremity once the initial traumatic episode has resolved. A right shoulder x-ray showed a pathologic fracture through the surgical neck of humerus with involvement of greater tubercle. Patient received Lopressor 25 mg PO x1 in the ER and her a-fib w/ RVR has converted to sinus rhythm. Patient reports she was on Eliquis before but it caused her to have bloody BM and she hasn't been on it in years.     Overview/Hospital Course:  Patient with history of afib and hypertension presented with right arm pain found to right surgical neck fracture of the humerus.  Patient was initially atrial fibrillation with RVR, received p.o. Lopressor then converted to sinus rhythm.  Initial concern that fracture was pathological, MRI done did not report pathological.  Oncology initially consulted, recommended bone scan that can be done outpatient.  Orthopedic surgery  consulted.  Recommended patient follow up outpatient with planned surgery next Tuesday.  Initially patient was discharged, on discharge patient reported being weak and not able to walk properly.  Discharge was discontinued, patient admitted.  PT consulted. Will likely need HHC on dc.     Interval History: reporting right rib pain, no fx on xray. Na improving. Rate controlled.     Review of Systems   Constitutional:  Negative for diaphoresis.   Respiratory:  Negative for choking and shortness of breath.    Cardiovascular:  Negative for chest pain.     Objective:     Vital Signs (Most Recent):  Temp: 98.7 °F (37.1 °C) (02/20/24 1751)  Pulse: 69 (02/21/24 1052)  Resp: 18 (02/21/24 1029)  BP: (!) 141/67 (02/21/24 1052)  SpO2: (!) 92 % (02/21/24 1052) Vital Signs (24h Range):  Temp:  [98.7 °F (37.1 °C)] 98.7 °F (37.1 °C)  Pulse:  [] 69  Resp:  [15-19] 18  SpO2:  [91 %-99 %] 92 %  BP: (119-172)/() 141/67     Weight: 98.9 kg (218 lb)  Body mass index is 41.19 kg/m².    Intake/Output Summary (Last 24 hours) at 2/21/2024 1721  Last data filed at 2/20/2024 2134  Gross per 24 hour   Intake 500 ml   Output --   Net 500 ml         Physical Exam  Constitutional:       Appearance: She is not ill-appearing.   Cardiovascular:      Rate and Rhythm: Normal rate and regular rhythm.   Pulmonary:      Effort: No respiratory distress.      Breath sounds: No wheezing.   Musculoskeletal:      Comments: Right arm in sling    Neurological:      General: No focal deficit present.      Mental Status: She is alert and oriented to person, place, and time.      Comments: Chronic right foot drop             Significant Labs: All pertinent labs within the past 24 hours have been reviewed.  CBC:   Recent Labs   Lab 02/20/24 1848 02/21/24  0542   WBC 8.73 6.68   HGB 12.4 11.1*   HCT 38.2 34.4*    212     CMP:   Recent Labs   Lab 02/20/24 1848 02/21/24  0542   * 135*   K 3.7 4.2    106   CO2 25 26   * 111*   BUN  10 12   CREATININE 0.6 0.6   CALCIUM 10.3 10.1   PROT 8.1 6.9   ALBUMIN 4.1 3.6   BILITOT 0.6 0.7   ALKPHOS 93 80   AST 17 15   ALT 12 10   ANIONGAP 3* 3*       Significant Imaging: I have reviewed all pertinent imaging results/findings within the past 24 hours.    Assessment/Plan:      * Atrial fibrillation with RVR  -Patient received Lopressor 25 mg PO x1 in the ER and her a-fib w/ RVR has converted to sinus rhythm  Cont lopressor   Not on AC given hx of GI bleeds   -Telemetry monitoring      Hyponatremia  -IVF  -Monitor labs  Resolving       Closed fracture of surgical neck of humerus  -Orthopedic Surgery consult, recommend sling and op follow up for surgery next week  PT/OT  -Analgesic prn  -Monitor vitals  Bone scan op and follow up with oncology as a precaution in 4 weeks      Hypertension  -Home meds and adjust as necessary  -Prn antihypertensive  -Vital sign checks      VTE Risk Mitigation (From admission, onward)           Ordered     Place sequential compression device  Until discontinued         02/20/24 2210     Reason for No Pharmacological VTE Prophylaxis  Once        Comments: NPO for possible surgical intervention   Question:  Reasons:  Answer:  Physician Provided (leave comment)    02/20/24 2209     IP VTE HIGH RISK PATIENT  Once         02/20/24 2209     Place sequential compression device  Until discontinued         02/20/24 2209                    Discharge Planning   EH: 2/21/2024     Code Status: Full Code   Is the patient medically ready for discharge?:     Reason for patient still in hospital (select all that apply): Patient trending condition  Discharge Plan A: Home   Discharge Delays: None known at this time              Libby Norton MD  Department of Hospital Medicine   [facility] - Emergency Dept

## 2024-02-21 NOTE — ASSESSMENT & PLAN NOTE
-Patient is on Cardizem drip  -Home meds when appropriate and adjust as necessary  -Vital sign checks

## 2024-02-21 NOTE — ASSESSMENT & PLAN NOTE
-Patient received Lopressor 25 mg PO x1 in the ER and her a-fib w/ RVR has converted to sinus rhythm  Cont lopressor   Not on AC given hx of GI bleeds   -Telemetry monitoring

## 2024-02-21 NOTE — ASSESSMENT & PLAN NOTE
-Patient received Lopressor 25 mg PO x1 in the ER and her a-fib w/ RVR has converted to sinus rhythm  -Home meds and adjust as necessary  -Telemetry monitoring  -Consider Cardiology consult  -Vital sign checks

## 2024-02-21 NOTE — CONSULTS
[facility] - Emergency Dept  Orthopedics  Consult Note    Patient Name: Paola Ibanez  MRN: [id number]  Admission Date: 2/20/2024  Hospital Length of Stay: 1 days  Attending Provider: Libby Norton MD  Primary Care Provider: May Rangel MD    Patient information was obtained from patient and ER records.     Consults  Subjective:     Principal Problem:Atrial fibrillation with RVR    Chief Complaint:   Chief Complaint   Patient presents with    Fall     Family found pt on floor around 1615. Pt states she fell around about 11am this morning.         HPI: Ortho consult R proximal humerus Fx    Per H&P:  This is a 65 y/o  female w/ pmh of a-fib, HTN who presents w/ right arm pain. Patient reports she was in her kitchen today and her floors are uneven and she stumbled and hit her arm on the cabinet; reports having had 9/10 pain but currently reports she's comfortable; denies headache, blurry vision, chest pain, SOB, palpitations, abdominal pain, N/V, dysuria and back pain. Patient is afebrile, tachycardic, hypertensive and saturating ok. Lab work reflects hyponatremia w/ Na of 131. Right humerus x-ray showed a pathologic fracture through the surgical neck of the humerus with cystic component in the proximal humeral diaphysis. Recommend supplement evaluation with CT scan of the upper extremity once the initial traumatic episode has resolved. A right shoulder x-ray showed a pathologic fracture through the surgical neck of humerus with involvement of greater tubercle. Patient received Lopressor 25 mg PO x1 in the ER and her a-fib w/ RVR has converted to sinus rhythm. Patient reports she was on Eliquis before but it caused her to have bloody BM and she hasn't been on it in years.     Being admitted for A-fib and hyponatremia    No past medical history on file.    Past Surgical History:   Procedure Laterality Date    HYSTERECTOMY         Review of patient's allergies indicates:    Allergen Reactions    Aspirin Nausea Only     Stomach Pain  Stomach Pain      Lisinopril Other (See Comments)     Cough  Cough      Penicillin g potassium Rash     Rash  Rash         Current Facility-Administered Medications   Medication    0.9%  NaCl infusion    acetaminophen tablet 650 mg    acetaminophen tablet 650 mg    aluminum-magnesium hydroxide-simethicone 200-200-20 mg/5 mL suspension 30 mL    dextrose 50% injection 12.5 g    dextrose 50% injection 25 g    diltiaZEM 100 mg in dextrose 5% 100 mL IVPB (ready to mix) (titrating)    glucagon (human recombinant) injection 1 mg    glucose chewable tablet 16 g    glucose chewable tablet 24 g    hydrALAZINE tablet 25 mg    hydroCHLOROthiazide tablet 25 mg    HYDROcodone-acetaminophen 5-325 mg per tablet 1 tablet    losartan tablet 100 mg    magnesium oxide tablet 800 mg    magnesium oxide tablet 800 mg    melatonin tablet 6 mg    metoprolol tartrate (LOPRESSOR) tablet 50 mg    morphine injection 1 mg    naloxone 0.4 mg/mL injection 0.02 mg    ondansetron injection 4 mg    potassium bicarbonate disintegrating tablet 35 mEq    potassium bicarbonate disintegrating tablet 50 mEq    potassium bicarbonate disintegrating tablet 60 mEq    potassium, sodium phosphates 280-160-250 mg packet 2 packet    potassium, sodium phosphates 280-160-250 mg packet 2 packet    potassium, sodium phosphates 280-160-250 mg packet 2 packet    sodium chloride 0.9% flush 2 mL     Current Outpatient Medications   Medication Sig    acetaminophen (TYLENOL) 650 MG TbSR Take 1 tablet (650 mg total) by mouth every 8 (eight) hours. (Patient taking differently: Take 325 mg by mouth every 8 (eight) hours.)    cloNIDine (CATAPRES) 0.1 MG tablet Take 0.1 mg by mouth every 8 (eight) hours as needed.    fluticasone propionate (FLONASE) 50 mcg/actuation nasal spray 1 spray by Nasal route once daily.    hydroCHLOROthiazide (HYDRODIURIL) 25 MG tablet Take 25 mg by mouth every morning.    losartan (COZAAR)  100 MG tablet Take 1 tablet (100 mg total) by mouth once daily.    metoprolol tartrate (LOPRESSOR) 50 MG tablet Take 1 tablet by mouth twice daily (Patient taking differently: Take 25 mg by mouth 2 (two) times daily.)    potassium chloride SA (K-DUR,KLOR-CON) 20 MEQ tablet Take 20 mEq by mouth daily as needed.    albuterol (PROVENTIL/VENTOLIN HFA) 90 mcg/actuation inhaler Inhale 1-2 puffs into the lungs every 6 (six) hours as needed for Wheezing. Rescue (Patient not taking: Reported on 2/20/2024)    hydrALAZINE (APRESOLINE) 25 MG tablet Take 25 mg by mouth every 8 (eight) hours.     Family History       Problem Relation (Age of Onset)    Breast cancer Mother    Seizures Sister          Tobacco Use    Smoking status: Never    Smokeless tobacco: Never   Substance and Sexual Activity    Alcohol use: Not Currently    Drug use: Never    Sexual activity: Not on file     Review of Systems   Constitutional: Negative for chills, decreased appetite, fever and night sweats.     Objective:     Vital Signs (Most Recent):  Temp: 98.7 °F (37.1 °C) (02/20/24 1751)  Pulse: 66 (02/21/24 0640)  Resp: 16 (02/21/24 0640)  BP: 131/79 (02/21/24 0640)  SpO2: 97 % (02/21/24 0640) Vital Signs (24h Range):  Temp:  [98.7 °F (37.1 °C)] 98.7 °F (37.1 °C)  Pulse:  [] 66  Resp:  [15-19] 16  SpO2:  [94 %-99 %] 97 %  BP: (121-172)/() 131/79     Weight: 98.9 kg (218 lb)     Body mass index is 41.19 kg/m².      Intake/Output Summary (Last 24 hours) at 2/21/2024 0732  Last data filed at 2/20/2024 2134  Gross per 24 hour   Intake 500 ml   Output --   Net 500 ml        General    Nursing note reviewed.  Constitutional: She is oriented to person, place, and time.   Obese   Pulmonary/Chest: Effort normal.   Neurological: She is alert and oriented to person, place, and time.   Psychiatric: She has a normal mood and affect. Her behavior is normal.         Right Shoulder Exam     Comments:  TAYLOR shld in a sling. Comfortable at this time. MADDY  DNVI.       Significant Labs: CBC:   Recent Labs   Lab 02/20/24  1848 02/21/24  0542   WBC 8.73 6.68   HGB 12.4 11.1*   HCT 38.2 34.4*    212     CMP:   Recent Labs   Lab 02/20/24  1848 02/21/24  0542   * 135*   K 3.7 4.2    106   CO2 25 26   * 111*   BUN 10 12   CREATININE 0.6 0.6   CALCIUM 10.3 10.1   PROT 8.1 6.9   ALBUMIN 4.1 3.6   BILITOT 0.6 0.7   ALKPHOS 93 80   AST 17 15   ALT 12 10   ANIONGAP 3* 3*     All pertinent labs within the past 24 hours have been reviewed.    Significant Imaging: X-Ray: I have reviewed all pertinent results/findings and my personal findings are:  R humerus X-rays:  2 views right humerus reveal evidence of pathologic fracture through the surgical neck of the humerus with well demarcated intermargin suggestive of a primary cystic lesion through the surgical neck of humerus with involvement of the lesser tubercle. Humeral head maintains appropriate alignment. Marginal clockwise rotation of the humeral head componen   Assessment/Plan:     Hyponatremia  Humerus Fx appears pathologic. Reviewed films with Dr. Gonzalez. Radiologist agrees.    Dr. Gonzalez recommends MRI of R shld and R humerus - no contrast  Oncology referral  No surgery today - pt may eat.        Thank you for your consult. I will follow-up with patient. Please contact us if you have any additional questions.    RAYMUNDO PECK  Orthopedics  [facility] - Emergency Dept

## 2024-02-21 NOTE — HPI
Ortho POD #3 S/P R prox humerus IM nail.    Doing well. Pain is well controlled. Sitting up in BS chair.

## 2024-02-21 NOTE — HPI
This is a 63 y/o  female w/ pmh of a-fib, HTN who presents w/ right arm pain. Patient reports she was in her kitchen today and her floors are uneven and she stumbled and hit her arm on the cabinet; reports having had 9/10 pain but currently reports she's comfortable; denies headache, blurry vision, chest pain, SOB, palpitations, abdominal pain, N/V, dysuria and back pain. Patient is afebrile, tachycardic, hypertensive and saturating ok. Lab work reflects hyponatremia w/ Na of 131. Right humerus x-ray showed a pathologic fracture through the surgical neck of the humerus with cystic component in the proximal humeral diaphysis. Recommend supplement evaluation with CT scan of the upper extremity once the initial traumatic episode has resolved. A right shoulder x-ray showed a pathologic fracture through the surgical neck of humerus with involvement of greater tubercle. Patient received Lopressor 25 mg PO x1 in the ER and her a-fib w/ RVR has converted to sinus rhythm. Patient reports she was on Eliquis before but it caused her to have bloody BM and she hasn't been on it in years.

## 2024-02-21 NOTE — SUBJECTIVE & OBJECTIVE
No past medical history on file.    Past Surgical History:   Procedure Laterality Date    HYSTERECTOMY         Review of patient's allergies indicates:   Allergen Reactions    Aspirin Nausea Only     Stomach Pain  Stomach Pain      Lisinopril Other (See Comments)     Cough  Cough      Penicillin g potassium Rash     Rash  Rash         No current facility-administered medications on file prior to encounter.     Current Outpatient Medications on File Prior to Encounter   Medication Sig    acetaminophen (TYLENOL) 650 MG TbSR Take 1 tablet (650 mg total) by mouth every 8 (eight) hours.    albuterol (PROVENTIL/VENTOLIN HFA) 90 mcg/actuation inhaler Inhale 1-2 puffs into the lungs every 6 (six) hours as needed for Wheezing. Rescue    cloNIDine (CATAPRES) 0.1 MG tablet Take 0.1 mg by mouth every 8 (eight) hours as needed.    fluticasone propionate (FLONASE) 50 mcg/actuation nasal spray 1 spray by Nasal route once daily.    hydrALAZINE (APRESOLINE) 25 MG tablet Take 25 mg by mouth every 8 (eight) hours.    hydroCHLOROthiazide (HYDRODIURIL) 25 MG tablet Take 25 mg by mouth every morning.    losartan (COZAAR) 100 MG tablet Take 1 tablet (100 mg total) by mouth once daily.    metoprolol tartrate (LOPRESSOR) 50 MG tablet Take 1 tablet by mouth twice daily (Patient taking differently: Take 50 mg by mouth 2 (two) times daily.)    ondansetron (ZOFRAN) 4 MG tablet Take 1 tablet (4 mg total) by mouth every 8 (eight) hours as needed for Nausea.    potassium chloride SA (K-DUR,KLOR-CON) 20 MEQ tablet Take 20 mEq by mouth daily as needed.    psyllium husk (METAMUCIL) 3.4 gram/5.4 gram Powd Take 1 Scoop by mouth Daily.     Family History       Problem Relation (Age of Onset)    Breast cancer Mother    Seizures Sister          Tobacco Use    Smoking status: Never    Smokeless tobacco: Never   Substance and Sexual Activity    Alcohol use: Not Currently    Drug use: Never    Sexual activity: Not on file     Review of Systems  Objective:      Vital Signs (Most Recent):  Temp: 98.7 °F (37.1 °C) (02/20/24 1751)  Pulse: 99 (02/20/24 2140)  Resp: 17 (02/20/24 2034)  BP: 133/72 (02/20/24 2140)  SpO2: 96 % (02/20/24 2140) Vital Signs (24h Range):  Temp:  [98.7 °F (37.1 °C)] 98.7 °F (37.1 °C)  Pulse:  [] 99  Resp:  [15-19] 17  SpO2:  [94 %-98 %] 96 %  BP: (133-172)/() 133/72     Weight: 98.9 kg (218 lb)  Body mass index is 41.19 kg/m².     Physical Exam  Gen: In bed, NAD  Head: Normocephalic  CVD: RRR  Resp: No distress or accessory muscle use  GI: Abd soft, ND  Ext: RUE in sling  Skin: Warm, dry  Neuro: Alert            Significant Labs: All pertinent labs within the past 24 hours have been reviewed.  CBC:   Recent Labs   Lab 02/20/24  1848   WBC 8.73   HGB 12.4   HCT 38.2        CMP:   Recent Labs   Lab 02/20/24  1848   *   K 3.7      CO2 25   *   BUN 10   CREATININE 0.6   CALCIUM 10.3   PROT 8.1   ALBUMIN 4.1   BILITOT 0.6   ALKPHOS 93   AST 17   ALT 12   ANIONGAP 3*       Significant Imaging: I have reviewed all pertinent imaging results/findings within the past 24 hours.

## 2024-02-21 NOTE — PHARMACY MED REC
"Admission Medication History     The home medication history was taken by Mary Ruvalcaba.    You may go to "Admission" then "Reconcile Home Medications" tabs to review and/or act upon these items.     The home medication list has been updated by the Pharmacy department.   Please read ALL comments highlighted in yellow.   Please address this information as you see fit.    Feel free to contact us if you have any questions or require assistance.        Medications listed below were obtained from: Patient/family and Analytic software- [employer]  No current facility-administered medications on file prior to encounter.     Current Outpatient Medications on File Prior to Encounter   Medication Sig Dispense Refill    acetaminophen (TYLENOL) 650 MG TbSR Take 1 tablet (650 mg total) by mouth every 8 (eight) hours. (Patient taking differently: Take 325 mg by mouth every 8 (eight) hours.) 30 tablet 0    cloNIDine (CATAPRES) 0.1 MG tablet Take 0.1 mg by mouth every 8 (eight) hours as needed.      fluticasone propionate (FLONASE) 50 mcg/actuation nasal spray 1 spray by Nasal route once daily.      hydroCHLOROthiazide (HYDRODIURIL) 25 MG tablet Take 25 mg by mouth every morning.      losartan (COZAAR) 100 MG tablet Take 1 tablet (100 mg total) by mouth once daily. 30 tablet 5    metoprolol tartrate (LOPRESSOR) 50 MG tablet Take 1 tablet by mouth twice daily (Patient taking differently: Take 25 mg by mouth 2 (two) times daily.) 180 tablet 0    potassium chloride SA (K-DUR,KLOR-CON) 20 MEQ tablet Take 20 mEq by mouth daily as needed.      albuterol (PROVENTIL/VENTOLIN HFA) 90 mcg/actuation inhaler Inhale 1-2 puffs into the lungs every 6 (six) hours as needed for Wheezing. Rescue (Patient not taking: Reported on 2/20/2024) 18 g 0    hydrALAZINE (APRESOLINE) 25 MG tablet Take 25 mg by mouth every 8 (eight) hours.      [DISCONTINUED] ondansetron (ZOFRAN) 4 MG tablet Take 1 tablet (4 mg total) by mouth every 8 (eight) hours as needed " for Nausea. 15 tablet 0    [DISCONTINUED] psyllium husk (METAMUCIL) 3.4 gram/5.4 gram Powd Take 1 Scoop by mouth Daily. 283 g 0       Potential issues to be addressed PRIOR TO DISCHARGE  Patient reported not taking the following medications: (Albuterol & Hydralazine). These medications remain on the home medication list. Please address accordingly.     Mary Ruvalcaba  EXT [id number]                  .

## 2024-02-21 NOTE — ED PROVIDER NOTES
Encounter Date: 2/20/2024       History     Chief Complaint   Patient presents with    Fall     Family found pt on floor around 1615. Pt states she fell around about 11am this morning.      Patient is a 64 y.o. female with past medical history of AFib and hypertension who presents to ED via EMS transportation for concern for fall which began around 11:00 a.m..  Patient reports she tripped and fell and hit her right arm and right ribs on the counter and then fell to the ground.  Patient denies head injury or loss of consciousness.  Patient reports she was unable to get herself up and had to wait at home until family arrived.  Patient reports family arrived around 4:00 a.m. and called EMS who transported patient to the hospital.  Patient reports she has a history of decreased movement on the right side and she thinks caused her to fall isn't be able to get back up.  Patient denies taking a blood thinner for her AFib.  Patient reports pain in her right shoulder and right arm as well as her right ribs.  Patient denies chest pain or shortness of breath.  Patient is awake and alert in no acute distress.      Review of patient's allergies indicates:   Allergen Reactions    Aspirin Nausea Only     Stomach Pain  Stomach Pain      Lisinopril Other (See Comments)     Cough  Cough      Penicillin g potassium Rash     Rash  Rash       No past medical history on file.  Past Surgical History:   Procedure Laterality Date    HYSTERECTOMY       Family History   Problem Relation Age of Onset    Seizures Sister     Breast cancer Mother      Social History     Tobacco Use    Smoking status: Never    Smokeless tobacco: Never   Substance Use Topics    Alcohol use: Not Currently    Drug use: Never     Review of Systems   Constitutional: Negative.  Negative for fever.   HENT: Negative.     Respiratory: Negative.  Negative for shortness of breath.    Cardiovascular: Negative.  Negative for chest pain.   Gastrointestinal: Negative.  Negative  for nausea and vomiting.   Genitourinary: Negative.    Musculoskeletal:  Negative for back pain, neck pain and neck stiffness.        Right shoulder pain, right rib pain   Skin:  Negative for color change, pallor and rash.   Neurological: Negative.  Negative for weakness.   Hematological:  Does not bruise/bleed easily.   Psychiatric/Behavioral: Negative.         Physical Exam     Initial Vitals [02/20/24 1751]   BP Pulse Resp Temp SpO2   (!) 142/81 (!) 112 15 98.7 °F (37.1 °C) (!) 94 %      MAP       --         Physical Exam    Nursing note and vitals reviewed.  Constitutional: She appears well-developed and well-nourished. She is not diaphoretic. No distress.   HENT:   Head: Normocephalic and atraumatic.   Right Ear: External ear normal.   Left Ear: External ear normal.   Nose: Nose normal.   Eyes: Conjunctivae and EOM are normal.   Neck:   Normal range of motion.  Cardiovascular:  Normal rate, regular rhythm, normal heart sounds and intact distal pulses.     Exam reveals no gallop and no friction rub.       No murmur heard.  Pulmonary/Chest: Breath sounds normal. No respiratory distress. She has no decreased breath sounds. She has no wheezes. She has no rhonchi. She has no rales.         She exhibits bony tenderness. She exhibits no tenderness.   Musculoskeletal:      Right shoulder: Swelling, tenderness and bony tenderness present. No effusion, laceration or crepitus. Decreased range of motion.      Right upper arm: Swelling, deformity, tenderness and bony tenderness present. No lacerations.      Right elbow: Normal.      Right forearm: Normal.      Right wrist: Normal.      Right hand: Normal.        Arms:       Cervical back: Normal and normal range of motion. No tenderness or bony tenderness. No pain with movement. Normal range of motion.     Neurological: She is alert and oriented to person, place, and time. She has normal strength. GCS score is 15. GCS eye subscore is 4. GCS verbal subscore is 5. GCS motor  subscore is 6.   Skin: Skin is warm and dry. Capillary refill takes less than 2 seconds.   Psychiatric: She has a normal mood and affect. Her behavior is normal. Judgment and thought content normal.         ED Course   Procedures  Labs Reviewed   CBC W/ AUTO DIFFERENTIAL - Abnormal; Notable for the following components:       Result Value    RBC 3.90 (*)     MCH 31.8 (*)     Lymph # 0.7 (*)     Gran % 86.4 (*)     Lymph % 7.8 (*)     All other components within normal limits   COMPREHENSIVE METABOLIC PANEL - Abnormal; Notable for the following components:    Sodium 131 (*)     Glucose 155 (*)     Anion Gap 3 (*)     All other components within normal limits   B-TYPE NATRIURETIC PEPTIDE - Abnormal; Notable for the following components:     (*)     All other components within normal limits   MAGNESIUM   TROPONIN I HIGH SENSITIVITY          Imaging Results              X-ray Shoulder 2 or More Views Right (Final result)  Result time 02/20/24 20:03:39   Procedure changed from X-Ray Shoulder Trauma Right     Final result by Paulino Murcia Jr., MD (02/20/24 20:03:39)                   Narrative:    CLINICAL HISTORY:  64 years (1960) Female fall Fall (Family found pt on floor around 1615. Pt states she fell around about 11am this morning. )    TECHNIQUE:  XR SHOULDER 2 OR MORE VIEWS RIGHT. 2 images obtained.    COMPARISON:  None available.    FINDINGS:    2 views the right shoulder reveal evidence of pathologic fracture of the humeral diaphysis extending through the surgical neck of the humerus with mild medial deviation of the diaphysis of component. Fracture involves the greater tubercle without significant osseous destruction. The patient's upper extremities held in flexion.    IMPRESSION: Pathologic fracture through the surgical neck of humerus with involvement of greater tubercle.    Electronically signed by:  Paulino Murcia MD  02/20/2024 08:03 PM [facility] Workstation: [id number]                                      XR Ribs Min 3 Views w/PA Chest Right (Final result)  Result time 02/20/24 20:02:24   Procedure changed from X-Ray Ribs 2 View Right     Final result by Paulino Murcia Jr., MD (02/20/24 20:02:24)                   Narrative:    CLINICAL HISTORY:  64 years (1960) Female fall Fall (Family found pt on floor around 1615. Pt states she fell around about 11am this morning. )    TECHNIQUE:  XR RIBS RIGHT WITH CHEST. 6 images obtained.    COMPARISON:  None available.    FINDINGS:    Anterior and posterior arches of the posterior ribs are well-maintained without evidence of an acute fracture. No evidence of traumatic episode. Normal alignment. Chronic degenerative spondylosis changes of the thoracic spine on the basis of diffuse idiopathic skeletal hyperostosis. Pathologic fracture of the proximal humeral diaphysis through the surgical neck.    IMPRESSION:  1.  No evidence of acute traumatic injury.  2.  Pathologic fracture of the proximal humeral diaphysis through the surgical neck.    Electronically signed by:  Paulino Murcia MD  02/20/2024 08:02 PM [facility] Workstation: [id number]                                     X-Ray Humerus 2 View Right (Final result)  Result time 02/20/24 20:00:48      Final result by Paulino Murcia Jr., MD (02/20/24 20:00:48)                   Narrative:    CLINICAL HISTORY:  64 years (1960) Female Fall (Family found pt on floor around 1615. Pt states she fell around about 11am this morning. )    TECHNIQUE:  XR HUMERUS RIGHT. 2 images obtained.    COMPARISON:  None available.    FINDINGS:    2 views right humerus reveal evidence of pathologic fracture through the surgical neck of the humerus with well demarcated intermargin suggestive of a primary cystic lesion through the surgical neck of humerus with involvement of the lesser tubercle. Humeral head maintains appropriate alignment. Marginal clockwise rotation of the humeral head component.    IMPRESSION: Pathologic  fracture through the surgical neck of the humerus with cystic component in the proximal humeral diaphysis. Recommend supplement evaluation with CT scan of the upper extremity once the initial traumatic episode has resolved.    Electronically signed by:  Paulino Murcia MD  02/20/2024 08:00 PM [facility] Workstation: [id number]                                     Medications   diltiaZEM 100 mg in dextrose 5% 100 mL IVPB (ready to mix) (titrating) (has no administration in time range)   sodium chloride 0.9% flush 2 mL (has no administration in time range)   melatonin tablet 6 mg (has no administration in time range)   ondansetron injection 4 mg (has no administration in time range)   acetaminophen tablet 650 mg (has no administration in time range)   aluminum-magnesium hydroxide-simethicone 200-200-20 mg/5 mL suspension 30 mL (has no administration in time range)   acetaminophen tablet 650 mg (has no administration in time range)   HYDROcodone-acetaminophen 5-325 mg per tablet 1 tablet (has no administration in time range)   naloxone 0.4 mg/mL injection 0.02 mg (has no administration in time range)   potassium bicarbonate disintegrating tablet 50 mEq (has no administration in time range)   potassium bicarbonate disintegrating tablet 35 mEq (has no administration in time range)   potassium bicarbonate disintegrating tablet 60 mEq (has no administration in time range)   magnesium oxide tablet 800 mg (has no administration in time range)   magnesium oxide tablet 800 mg (has no administration in time range)   potassium, sodium phosphates 280-160-250 mg packet 2 packet (has no administration in time range)   potassium, sodium phosphates 280-160-250 mg packet 2 packet (has no administration in time range)   potassium, sodium phosphates 280-160-250 mg packet 2 packet (has no administration in time range)   glucose chewable tablet 16 g (has no administration in time range)   glucose chewable tablet 24 g (has no administration in  time range)   dextrose 50% injection 12.5 g (has no administration in time range)   dextrose 50% injection 25 g (has no administration in time range)   glucagon (human recombinant) injection 1 mg (has no administration in time range)   morphine injection 1 mg (has no administration in time range)   0.9%  NaCl infusion (has no administration in time range)   hydrALAZINE tablet 25 mg (has no administration in time range)   metoprolol tartrate (LOPRESSOR) tablet 50 mg (has no administration in time range)   losartan tablet 100 mg (has no administration in time range)   hydroCHLOROthiazide tablet 25 mg (has no administration in time range)   ondansetron injection 4 mg (4 mg Intravenous Given 2/20/24 1849)   morphine injection 4 mg (4 mg Intravenous Given 2/20/24 1850)   sodium chloride 0.9% bolus 500 mL 500 mL (0 mLs Intravenous Stopped 2/20/24 2134)   HYDROmorphone injection 0.5 mg (0.5 mg Intravenous Given 2/20/24 2034)   metoprolol tartrate (LOPRESSOR) tablet 25 mg (25 mg Oral Given 2/20/24 2044)     Medical Decision Making  MDM    Patient presents for emergent evaluation of acute fall that poses a possible threat to life and/or bodily function.    Differential diagnosis included but not limited to fracture, dislocation, sprain, pneumothorax.  In the ED patient found to have acute pain to palpation of right shoulder right upper arm with swelling and mild deformity noted to the right upper arm.  Patient's right arm is held in adduction. Patient was normal distal radial pulse and normal strength and sensation in her right hand.  Patient denies pain to palpation of right elbow right forearm or right wrist or hand.  Patient reports pain in the posterior lower right ribs.  No significant pain to palpation of the areas.  Patient has clear lung sounds in bilateral lung fields.  Patient unable to sit up or move due to severe pain in her right shoulder.   EMS reports they gave patient 100 mcg of fentanyl prior to arrival with  4 mg of Zofran.  Patient reports her pain is still 9/10 on arrival to the ED.    Admit MDM  I discussed the patient presentation labs, ekg, X-rays, findings with my attending Dr. Yi who individually evaluated the patient.    Patient was managed in the ED with IV morphine, Zofran, Dilaudid, and oral metoprolol.    Patient's heart rate remained in the 110s to 130s in the ED and appears to be in AFib with RVR on EKG.  Patient continued to have severe pain after 4 mg of morphine and 0.5mg Dilaudid.  Will admit the patient to the hospital for pain control as well as rate control with AFib.  Patient was placed in an arm sling by RN.  I discussed the patient presentation labs, ekg, X-rays, findings with the consultant Dr. Hardy Porter (hospital medicine) who agreed to admit the patient for further evaluation and workup.  Dr. Porter reports he will consult orthopedics for the patient.  The response to treatment was good.    Patient was admitted in stable condition.     NP uses Epic and voice recognition software prone to occasional and minor errors that may persist in the medical record.     Amount and/or Complexity of Data Reviewed  Independent Historian: EMS  Labs: ordered. Decision-making details documented in ED Course.  Radiology: ordered and independent interpretation performed.     Details: X-ray right shoulder significant for IMPRESSION: Pathologic fracture through the surgical neck of humerus with involvement of greater tubercle.  X-ray right humerus significant for IMPRESSION: Pathologic fracture through the surgical neck of the humerus with cystic component in the proximal humeral diaphysis. Recommend supplement evaluation with CT scan of the upper extremity once the initial traumatic episode has resolved.  X-ray chest with right ribs significant for IMPRESSION: 1.  No evidence of acute traumatic injury. 2.  Pathologic fracture of the proximal humeral diaphysis through the surgical neck.  ECG/medicine tests:  ordered and independent interpretation performed. Decision-making details documented in ED Course.     Details: EKG reviewed with my attending.  Initial EKG shows atrial fibrillation with RVR with ventricular rate of 112 bpm.  Repeat EKG at 10:28 p.m. shows normal sinus rhythm, with ventricular rate 76 bpm.    Risk  Prescription drug management.  Decision regarding hospitalization.               ED Course as of 02/21/24 0010   Tue Feb 20, 2024 1811 EKG 12-lead  EKG reviewed with attending.  EKG significant for atrial fibrillation with RVR, ventricular rate of 112 beats per minute, no signs of occlusive MI [MP]   1943 WBC: 8.73 [MP]   1944 RBC(!): 3.90 [MP]   1944 Sodium(!): 131 [MP]   1944 Glucose(!): 155 [MP]   1944 BNP(!): 112 [MP]   1944 MCH(!): 31.8 [MP]   1945 Lymph #(!): 0.7 [MP]   1945 Gran %(!): 86.4 [MP]   1945 Lymph %(!): 7.8 [MP]      ED Course User Index  [MP] Muriel Pope NP                           Clinical Impression:  Final diagnoses:  [R00.0] Tachycardia  [S42.211A] Closed displaced fracture of surgical neck of right humerus, unspecified fracture morphology, initial encounter (Primary)  [I48.91] A-fib          ED Disposition Condition    Admit Stable                Muriel Pope NP  02/21/24 0010

## 2024-02-21 NOTE — PLAN OF CARE
Discharge orders and chart reviewed with no further post-acute discharge needs identified at this time.  At this time, patient is cleared for discharge from Case Management standpoint.        02/21/24 1426   Final Note   Assessment Type Final Discharge Note   Anticipated Discharge Disposition Home   Hospital Resources/Appts/Education Provided Appointments scheduled and added to AVS   Post-Acute Status   Coverage [employer]   Discharge Delays None known at this time

## 2024-02-21 NOTE — ASSESSMENT & PLAN NOTE
Humerus Fx appears pathologic. Reviewed films with Dr. Gonzalez. Radiologist agrees.    Dr. Gonzalez recommends MRI of R shld and R humerus - no contrast  Oncology referral  No surgery today - pt may eat.

## 2024-02-21 NOTE — H&P
[facility] - Emergency Dept  Hospital Medicine  History & Physical    Patient Name: Paola Ibanez  MRN: [id number]  Patient Class: IP- Inpatient  Admission Date: 2/20/2024  Attending Physician: Hardy Porter MD  Primary Care Provider: May Rangel MD         Patient information was obtained from patient and ER records.     Subjective:     Principal Problem:Atrial fibrillation with RVR    Chief Complaint:   Chief Complaint   Patient presents with    Fall     Family found pt on floor around 1615. Pt states she fell around about 11am this morning.         HPI: This is a 63 y/o  female w/ pmh of a-fib, HTN who presents w/ right arm pain. Patient reports she was in her kitchen today and her floors are uneven and she stumbled and hit her arm on the cabinet; reports having had 9/10 pain but currently reports she's comfortable; denies headache, blurry vision, chest pain, SOB, palpitations, abdominal pain, N/V, dysuria and back pain. Patient is afebrile, tachycardic, hypertensive and saturating ok. Lab work reflects hyponatremia w/ Na of 131. Right humerus x-ray showed a pathologic fracture through the surgical neck of the humerus with cystic component in the proximal humeral diaphysis. Recommend supplement evaluation with CT scan of the upper extremity once the initial traumatic episode has resolved. A right shoulder x-ray showed a pathologic fracture through the surgical neck of humerus with involvement of greater tubercle. Patient received Lopressor 25 mg PO x1 in the ER and her a-fib w/ RVR has converted to sinus rhythm. Patient reports she was on Eliquis before but it caused her to have bloody BM and she hasn't been on it in years.     No new subjective & objective note has been filed under this hospital service since the last note was generated.      Assessment/Plan:     * Atrial fibrillation with RVR  -Patient received Lopressor 25 mg PO x1 in the ER and her a-fib w/ RVR has  converted to sinus rhythm  -Home meds and adjust as necessary  -Telemetry monitoring  -Consider Cardiology consult  -Vital sign checks    Humerus fracture  -Orthopedic Surgery consult  -Analgesic prn  -Monitor vitals      Hypertension  -Home meds and adjust as necessary  -Prn antihypertensive  -Vital sign checks    Hyponatremia  -IVF  -Monitor labs        VTE Risk Mitigation (From admission, onward)           Ordered     Place sequential compression device  Until discontinued         02/20/24 2210     Reason for No Pharmacological VTE Prophylaxis  Once        Comments: NPO for possible surgical intervention   Question:  Reasons:  Answer:  Physician Provided (leave comment)    02/20/24 2209     IP VTE HIGH RISK PATIENT  Once         02/20/24 2209     Place sequential compression device  Until discontinued         02/20/24 2209                                    Hardy Porter MD  Department of Hospital Medicine  [facility] - Emergency Dept

## 2024-02-21 NOTE — SUBJECTIVE & OBJECTIVE
No past medical history on file.    Past Surgical History:   Procedure Laterality Date    HYSTERECTOMY         Review of patient's allergies indicates:   Allergen Reactions    Aspirin Nausea Only     Stomach Pain  Stomach Pain      Lisinopril Other (See Comments)     Cough  Cough      Penicillin g potassium Rash     Rash  Rash         Current Facility-Administered Medications   Medication    0.9%  NaCl infusion    acetaminophen tablet 650 mg    acetaminophen tablet 650 mg    aluminum-magnesium hydroxide-simethicone 200-200-20 mg/5 mL suspension 30 mL    dextrose 50% injection 12.5 g    dextrose 50% injection 25 g    diltiaZEM 100 mg in dextrose 5% 100 mL IVPB (ready to mix) (titrating)    glucagon (human recombinant) injection 1 mg    glucose chewable tablet 16 g    glucose chewable tablet 24 g    hydrALAZINE tablet 25 mg    hydroCHLOROthiazide tablet 25 mg    HYDROcodone-acetaminophen 5-325 mg per tablet 1 tablet    losartan tablet 100 mg    magnesium oxide tablet 800 mg    magnesium oxide tablet 800 mg    melatonin tablet 6 mg    metoprolol tartrate (LOPRESSOR) tablet 50 mg    morphine injection 1 mg    naloxone 0.4 mg/mL injection 0.02 mg    ondansetron injection 4 mg    potassium bicarbonate disintegrating tablet 35 mEq    potassium bicarbonate disintegrating tablet 50 mEq    potassium bicarbonate disintegrating tablet 60 mEq    potassium, sodium phosphates 280-160-250 mg packet 2 packet    potassium, sodium phosphates 280-160-250 mg packet 2 packet    potassium, sodium phosphates 280-160-250 mg packet 2 packet    sodium chloride 0.9% flush 2 mL     Current Outpatient Medications   Medication Sig    acetaminophen (TYLENOL) 650 MG TbSR Take 1 tablet (650 mg total) by mouth every 8 (eight) hours. (Patient taking differently: Take 325 mg by mouth every 8 (eight) hours.)    cloNIDine (CATAPRES) 0.1 MG tablet Take 0.1 mg by mouth every 8 (eight) hours as needed.    fluticasone propionate (FLONASE) 50 mcg/actuation  nasal spray 1 spray by Nasal route once daily.    hydroCHLOROthiazide (HYDRODIURIL) 25 MG tablet Take 25 mg by mouth every morning.    losartan (COZAAR) 100 MG tablet Take 1 tablet (100 mg total) by mouth once daily.    metoprolol tartrate (LOPRESSOR) 50 MG tablet Take 1 tablet by mouth twice daily (Patient taking differently: Take 25 mg by mouth 2 (two) times daily.)    potassium chloride SA (K-DUR,KLOR-CON) 20 MEQ tablet Take 20 mEq by mouth daily as needed.    albuterol (PROVENTIL/VENTOLIN HFA) 90 mcg/actuation inhaler Inhale 1-2 puffs into the lungs every 6 (six) hours as needed for Wheezing. Rescue (Patient not taking: Reported on 2/20/2024)    hydrALAZINE (APRESOLINE) 25 MG tablet Take 25 mg by mouth every 8 (eight) hours.     Family History       Problem Relation (Age of Onset)    Breast cancer Mother    Seizures Sister          Tobacco Use    Smoking status: Never    Smokeless tobacco: Never   Substance and Sexual Activity    Alcohol use: Not Currently    Drug use: Never    Sexual activity: Not on file     Review of Systems   Constitutional: Negative for chills, decreased appetite, fever and night sweats.     Objective:     Vital Signs (Most Recent):  Temp: 98.7 °F (37.1 °C) (02/20/24 1751)  Pulse: 66 (02/21/24 0640)  Resp: 16 (02/21/24 0640)  BP: 131/79 (02/21/24 0640)  SpO2: 97 % (02/21/24 0640) Vital Signs (24h Range):  Temp:  [98.7 °F (37.1 °C)] 98.7 °F (37.1 °C)  Pulse:  [] 66  Resp:  [15-19] 16  SpO2:  [94 %-99 %] 97 %  BP: (121-172)/() 131/79     Weight: 98.9 kg (218 lb)     Body mass index is 41.19 kg/m².      Intake/Output Summary (Last 24 hours) at 2/21/2024 0732  Last data filed at 2/20/2024 2134  Gross per 24 hour   Intake 500 ml   Output --   Net 500 ml        General    Nursing note reviewed.  Constitutional: She is oriented to person, place, and time.   Obese   Pulmonary/Chest: Effort normal.   Neurological: She is alert and oriented to person, place, and time.   Psychiatric: She  has a normal mood and affect. Her behavior is normal.         Right Shoulder Exam     Comments:  R shld in a sling. Comfortable at this time. MADDY CEDILLO.       Significant Labs: CBC:   Recent Labs   Lab 02/20/24 1848 02/21/24  0542   WBC 8.73 6.68   HGB 12.4 11.1*   HCT 38.2 34.4*    212     CMP:   Recent Labs   Lab 02/20/24 1848 02/21/24  0542   * 135*   K 3.7 4.2    106   CO2 25 26   * 111*   BUN 10 12   CREATININE 0.6 0.6   CALCIUM 10.3 10.1   PROT 8.1 6.9   ALBUMIN 4.1 3.6   BILITOT 0.6 0.7   ALKPHOS 93 80   AST 17 15   ALT 12 10   ANIONGAP 3* 3*     All pertinent labs within the past 24 hours have been reviewed.    Significant Imaging: X-Ray: I have reviewed all pertinent results/findings and my personal findings are:  R humerus X-rays:  2 views right humerus reveal evidence of pathologic fracture through the surgical neck of the humerus with well demarcated intermargin suggestive of a primary cystic lesion through the surgical neck of humerus with involvement of the lesser tubercle. Humeral head maintains appropriate alignment. Marginal clockwise rotation of the humeral head componen

## 2024-02-22 LAB
ALBUMIN SERPL BCP-MCNC: 3.4 G/DL (ref 3.5–5.2)
ALP SERPL-CCNC: 73 U/L (ref 55–135)
ALT SERPL W/O P-5'-P-CCNC: 10 U/L (ref 10–44)
ANION GAP SERPL CALC-SCNC: 3 MMOL/L (ref 8–16)
AST SERPL-CCNC: 13 U/L (ref 10–40)
BASOPHILS # BLD AUTO: 0.03 K/UL (ref 0–0.2)
BASOPHILS NFR BLD: 0.4 % (ref 0–1.9)
BILIRUB SERPL-MCNC: 0.8 MG/DL (ref 0.1–1)
BUN SERPL-MCNC: 13 MG/DL (ref 8–23)
CALCIUM SERPL-MCNC: 10.4 MG/DL (ref 8.7–10.5)
CHLORIDE SERPL-SCNC: 107 MMOL/L (ref 95–110)
CO2 SERPL-SCNC: 25 MMOL/L (ref 23–29)
CREAT SERPL-MCNC: 0.6 MG/DL (ref 0.5–1.4)
DIFFERENTIAL METHOD BLD: ABNORMAL
EOSINOPHIL # BLD AUTO: 0 K/UL (ref 0–0.5)
EOSINOPHIL NFR BLD: 0.4 % (ref 0–8)
ERYTHROCYTE [DISTWIDTH] IN BLOOD BY AUTOMATED COUNT: 13 % (ref 11.5–14.5)
EST. GFR  (NO RACE VARIABLE): >60 ML/MIN/1.73 M^2
GLUCOSE SERPL-MCNC: 109 MG/DL (ref 70–110)
HCT VFR BLD AUTO: 34.3 % (ref 37–48.5)
HGB BLD-MCNC: 10.9 G/DL (ref 12–16)
IMM GRANULOCYTES # BLD AUTO: 0.03 K/UL (ref 0–0.04)
IMM GRANULOCYTES NFR BLD AUTO: 0.4 % (ref 0–0.5)
LYMPHOCYTES # BLD AUTO: 1.7 K/UL (ref 1–4.8)
LYMPHOCYTES NFR BLD: 24.8 % (ref 18–48)
MAGNESIUM SERPL-MCNC: 1.9 MG/DL (ref 1.6–2.6)
MCH RBC QN AUTO: 31.5 PG (ref 27–31)
MCHC RBC AUTO-ENTMCNC: 31.8 G/DL (ref 32–36)
MCV RBC AUTO: 99 FL (ref 82–98)
MONOCYTES # BLD AUTO: 0.7 K/UL (ref 0.3–1)
MONOCYTES NFR BLD: 9.9 % (ref 4–15)
NEUTROPHILS # BLD AUTO: 4.4 K/UL (ref 1.8–7.7)
NEUTROPHILS NFR BLD: 64.1 % (ref 38–73)
NRBC BLD-RTO: 0 /100 WBC
PLATELET # BLD AUTO: 188 K/UL (ref 150–450)
PMV BLD AUTO: 10.7 FL (ref 9.2–12.9)
POTASSIUM SERPL-SCNC: 4.1 MMOL/L (ref 3.5–5.1)
PROT SERPL-MCNC: 6.8 G/DL (ref 6–8.4)
RBC # BLD AUTO: 3.46 M/UL (ref 4–5.4)
SODIUM SERPL-SCNC: 135 MMOL/L (ref 136–145)
WBC # BLD AUTO: 6.9 K/UL (ref 3.9–12.7)

## 2024-02-22 PROCEDURE — 63600175 PHARM REV CODE 636 W HCPCS: Performed by: INTERNAL MEDICINE

## 2024-02-22 PROCEDURE — 12000002 HC ACUTE/MED SURGE SEMI-PRIVATE ROOM

## 2024-02-22 PROCEDURE — 25000003 PHARM REV CODE 250: Performed by: STUDENT IN AN ORGANIZED HEALTH CARE EDUCATION/TRAINING PROGRAM

## 2024-02-22 PROCEDURE — 93010 ELECTROCARDIOGRAM REPORT: CPT | Mod: ,,, | Performed by: INTERNAL MEDICINE

## 2024-02-22 PROCEDURE — 36415 COLL VENOUS BLD VENIPUNCTURE: CPT | Performed by: INTERNAL MEDICINE

## 2024-02-22 PROCEDURE — 83735 ASSAY OF MAGNESIUM: CPT | Performed by: INTERNAL MEDICINE

## 2024-02-22 PROCEDURE — 63600175 PHARM REV CODE 636 W HCPCS: Performed by: STUDENT IN AN ORGANIZED HEALTH CARE EDUCATION/TRAINING PROGRAM

## 2024-02-22 PROCEDURE — 93005 ELECTROCARDIOGRAM TRACING: CPT | Performed by: INTERNAL MEDICINE

## 2024-02-22 PROCEDURE — 25000003 PHARM REV CODE 250: Performed by: INTERNAL MEDICINE

## 2024-02-22 PROCEDURE — 97163 PT EVAL HIGH COMPLEX 45 MIN: CPT

## 2024-02-22 PROCEDURE — 80053 COMPREHEN METABOLIC PANEL: CPT | Performed by: INTERNAL MEDICINE

## 2024-02-22 PROCEDURE — 85025 COMPLETE CBC W/AUTO DIFF WBC: CPT | Performed by: INTERNAL MEDICINE

## 2024-02-22 PROCEDURE — 97530 THERAPEUTIC ACTIVITIES: CPT

## 2024-02-22 RX ORDER — SODIUM CHLORIDE, SODIUM LACTATE, POTASSIUM CHLORIDE, CALCIUM CHLORIDE 600; 310; 30; 20 MG/100ML; MG/100ML; MG/100ML; MG/100ML
INJECTION, SOLUTION INTRAVENOUS CONTINUOUS
Status: DISCONTINUED | OUTPATIENT
Start: 2024-02-22 | End: 2024-02-24

## 2024-02-22 RX ORDER — MAGNESIUM SULFATE HEPTAHYDRATE 40 MG/ML
2 INJECTION, SOLUTION INTRAVENOUS ONCE
Status: COMPLETED | OUTPATIENT
Start: 2024-02-22 | End: 2024-02-22

## 2024-02-22 RX ADMIN — METOPROLOL TARTRATE 50 MG: 50 TABLET, FILM COATED ORAL at 10:02

## 2024-02-22 RX ADMIN — MORPHINE SULFATE 1 MG: 2 INJECTION, SOLUTION INTRAMUSCULAR; INTRAVENOUS at 05:02

## 2024-02-22 RX ADMIN — SODIUM CHLORIDE, POTASSIUM CHLORIDE, SODIUM LACTATE AND CALCIUM CHLORIDE: 600; 310; 30; 20 INJECTION, SOLUTION INTRAVENOUS at 06:02

## 2024-02-22 RX ADMIN — MORPHINE SULFATE 1 MG: 2 INJECTION, SOLUTION INTRAMUSCULAR; INTRAVENOUS at 09:02

## 2024-02-22 RX ADMIN — HYDROCODONE BITARTRATE AND ACETAMINOPHEN 1 TABLET: 5; 325 TABLET ORAL at 08:02

## 2024-02-22 RX ADMIN — HYDROCHLOROTHIAZIDE 25 MG: 25 TABLET ORAL at 07:02

## 2024-02-22 RX ADMIN — SODIUM CHLORIDE 250 ML: 0.9 INJECTION, SOLUTION INTRAVENOUS at 11:02

## 2024-02-22 RX ADMIN — MAGNESIUM SULFATE HEPTAHYDRATE 2 G: 40 INJECTION, SOLUTION INTRAVENOUS at 11:02

## 2024-02-22 RX ADMIN — METOPROLOL TARTRATE 50 MG: 50 TABLET, FILM COATED ORAL at 08:02

## 2024-02-22 NOTE — SUBJECTIVE & OBJECTIVE
Interval History: Pt reports feeling ok this morning. Hrs elevated but improved after morning lopressor given. Unable to have surgery this week. Plan for remaining inpatient ill surgery on Monday.     Review of Systems   Constitutional:  Negative for diaphoresis.   Respiratory:  Negative for choking and shortness of breath.    Cardiovascular:  Negative for chest pain.     Objective:     Vital Signs (Most Recent):  Temp: 98.5 °F (36.9 °C) (02/22/24 1350)  Pulse: 100 (02/22/24 1350)  Resp: 19 (02/22/24 1350)  BP: 95/68 (02/22/24 1350)  SpO2: (!) 92 % (02/22/24 1350) Vital Signs (24h Range):  Temp:  [98.5 °F (36.9 °C)] 98.5 °F (36.9 °C)  Pulse:  [] 100  Resp:  [3-27] 19  SpO2:  [92 %-99 %] 92 %  BP: ()/(55-92) 95/68     Weight: 102.4 kg (225 lb 12 oz)  Body mass index is 42.66 kg/m².  No intake or output data in the 24 hours ending 02/22/24 1540      Physical Exam  Constitutional:       Appearance: She is not ill-appearing.   Cardiovascular:      Rate and Rhythm: Normal rate and regular rhythm.   Pulmonary:      Effort: No respiratory distress.      Breath sounds: No wheezing.   Musculoskeletal:      Comments: Right arm in sling    Neurological:      General: No focal deficit present.      Mental Status: She is alert and oriented to person, place, and time.      Comments: Chronic right foot drop             Significant Labs: All pertinent labs within the past 24 hours have been reviewed.  CBC:   Recent Labs   Lab 02/20/24 1848 02/21/24  0542 02/22/24  0555   WBC 8.73 6.68 6.90   HGB 12.4 11.1* 10.9*   HCT 38.2 34.4* 34.3*    212 188     CMP:   Recent Labs   Lab 02/20/24  1848 02/21/24  0542 02/22/24  0555   * 135* 135*   K 3.7 4.2 4.1    106 107   CO2 25 26 25   * 111* 109   BUN 10 12 13   CREATININE 0.6 0.6 0.6   CALCIUM 10.3 10.1 10.4   PROT 8.1 6.9 6.8   ALBUMIN 4.1 3.6 3.4*   BILITOT 0.6 0.7 0.8   ALKPHOS 93 80 73   AST 17 15 13   ALT 12 10 10   ANIONGAP 3* 3* 3*        Significant Imaging: I have reviewed all pertinent imaging results/findings within the past 24 hours.

## 2024-02-22 NOTE — PROGRESS NOTES
[facility] - Emergency Dept  Orthopedics  Progress Note    Patient Name: Paola Ibanez  MRN: [id number]  Admission Date: 2/20/2024  Hospital Length of Stay: 2 days  Attending Provider: Libby Norton MD  Primary Care Provider: May Rangel MD    Subjective:     Principal Problem:Atrial fibrillation with RVR    Principal Orthopedic Problem: R proximal humerus Fx    Interval History: see note    Review of patient's allergies indicates:   Allergen Reactions    Aspirin Nausea Only     Stomach Pain  Stomach Pain      Lisinopril Other (See Comments)     Cough  Cough      Penicillin g potassium Rash     Rash  Rash         Current Facility-Administered Medications   Medication    acetaminophen tablet 650 mg    acetaminophen tablet 650 mg    aluminum-magnesium hydroxide-simethicone 200-200-20 mg/5 mL suspension 30 mL    dextrose 50% injection 12.5 g    dextrose 50% injection 25 g    glucagon (human recombinant) injection 1 mg    glucose chewable tablet 16 g    glucose chewable tablet 24 g    hydrALAZINE tablet 25 mg    hydroCHLOROthiazide tablet 25 mg    HYDROcodone-acetaminophen 5-325 mg per tablet 1 tablet    losartan tablet 100 mg    magnesium oxide tablet 800 mg    magnesium oxide tablet 800 mg    melatonin tablet 6 mg    metoprolol tartrate (LOPRESSOR) tablet 50 mg    morphine injection 1 mg    naloxone 0.4 mg/mL injection 0.02 mg    ondansetron injection 4 mg    potassium bicarbonate disintegrating tablet 35 mEq    potassium bicarbonate disintegrating tablet 50 mEq    potassium bicarbonate disintegrating tablet 60 mEq    potassium, sodium phosphates 280-160-250 mg packet 2 packet    potassium, sodium phosphates 280-160-250 mg packet 2 packet    potassium, sodium phosphates 280-160-250 mg packet 2 packet    sodium chloride 0.9% flush 2 mL     Current Outpatient Medications   Medication Sig    acetaminophen (TYLENOL) 650 MG TbSR Take 1 tablet (650 mg total) by mouth every 8 (eight) hours.  (Patient taking differently: Take 325 mg by mouth every 8 (eight) hours.)    cloNIDine (CATAPRES) 0.1 MG tablet Take 0.1 mg by mouth every 8 (eight) hours as needed.    fluticasone propionate (FLONASE) 50 mcg/actuation nasal spray 1 spray by Nasal route once daily.    hydroCHLOROthiazide (HYDRODIURIL) 25 MG tablet Take 25 mg by mouth every morning.    losartan (COZAAR) 100 MG tablet Take 1 tablet (100 mg total) by mouth once daily.    metoprolol tartrate (LOPRESSOR) 50 MG tablet Take 1 tablet by mouth twice daily (Patient taking differently: Take 25 mg by mouth 2 (two) times daily.)    potassium chloride SA (K-DUR,KLOR-CON) 20 MEQ tablet Take 20 mEq by mouth daily as needed.    albuterol (PROVENTIL/VENTOLIN HFA) 90 mcg/actuation inhaler Inhale 1-2 puffs into the lungs every 6 (six) hours as needed for Wheezing. Rescue (Patient not taking: Reported on 2/20/2024)    hydrALAZINE (APRESOLINE) 25 MG tablet Take 25 mg by mouth every 8 (eight) hours.    HYDROcodone-acetaminophen (NORCO) 5-325 mg per tablet Take 1 tablet by mouth every 6 (six) hours as needed for Pain.     Objective:     Vital Signs (Most Recent):  Temp: 98.5 °F (36.9 °C) (02/22/24 1350)  Pulse: 100 (02/22/24 1350)  Resp: 19 (02/22/24 1350)  BP: 95/68 (02/22/24 1350)  SpO2: (!) 92 % (02/22/24 1350) Vital Signs (24h Range):  Temp:  [98.5 °F (36.9 °C)] 98.5 °F (36.9 °C)  Pulse:  [] 100  Resp:  [3-27] 19  SpO2:  [92 %-99 %] 92 %  BP: ()/(55-92) 95/68     Weight: 98.9 kg (218 lb)     Body mass index is 41.19 kg/m².    No intake or output data in the 24 hours ending 02/22/24 1509     General    Nursing note and vitals reviewed.  Constitutional: She is oriented to person, place, and time.   Obese   Pulmonary/Chest: Effort normal.   Neurological: She is alert and oriented to person, place, and time.         Right Shoulder Exam     Comments:  RUE in a sling. RUE DNVI.       Significant Labs: CBC:   Recent Labs   Lab 02/20/24  1848 02/21/24  0542  02/22/24  0555   WBC 8.73 6.68 6.90   HGB 12.4 11.1* 10.9*   HCT 38.2 34.4* 34.3*    212 188     CMP:   Recent Labs   Lab 02/20/24  1848 02/21/24  0542 02/22/24  0555   * 135* 135*   K 3.7 4.2 4.1    106 107   CO2 25 26 25   * 111* 109   BUN 10 12 13   CREATININE 0.6 0.6 0.6   CALCIUM 10.3 10.1 10.4   PROT 8.1 6.9 6.8   ALBUMIN 4.1 3.6 3.4*   BILITOT 0.6 0.7 0.8   ALKPHOS 93 80 73   AST 17 15 13   ALT 12 10 10   ANIONGAP 3* 3* 3*     All pertinent labs within the past 24 hours have been reviewed.    Significant Imaging: X-Ray: I have reviewed all pertinent results/findings and my personal findings are:  X-ray metastatic survey:  No convincing radiographic evidence of osseous metastasis.     Bone scan or PET/CT would provide a more sensitive assessment if there strong clinical concern.     Right shoulder was not imaged due to patient factors/known fracture.    Bone scan: no metastatic disease    Right shld MRI:  Acute comminuted fracture involving the surgical neck of the right humerus. Mild medial and anterior displacement of the minimally angulated and impacted fracture fragments. Fracture extends through the greater tuberosity, with intact rotator cuff tendons inserting upon the greater tuberosity fracture fragment. The inferior glenohumeral ligament appears intact. Glenohumeral joint effusion. No fracture extension through the humeral head articular surface. Heterogeneous signal intensity at the fracture site consistent with hemorrhage/hematoma.     Marked subcutaneous edema of the shoulder, as well as diffuse intramuscular edema within the deltoid, likely representing contusion. There is also intramuscular edema within the teres minor muscle.     Moderate AC joint osteoarthrosis. Small foci of subchondral edema within the glenoid, likely secondary to glenohumeral osteoarthrosis.  Assessment/Plan:     Hyponatremia  Humerus Fx appears pathologic. Reviewed films with Dr. Gonzalez.  Radiologist agrees.    Dr. Gonzalez recommends MRI of R shld and R humerus - no contrast  Oncology referral  No surgery today - pt may eat.    Closed fracture of surgical neck of humerus  RUE in sling at all time  RUE NWB  PT for mobility and LE endurance  OT for ADLs    Plan is for the patient to be admitted and transferred to [facility] prior to Monday and to have R humerus ORIF on Monday with Dr. Gonzalez  - She cannot safely be DC'd home at this time          RAYMUNDO PECK  Orthopedics  Critical access hospital - Emergency Dept

## 2024-02-22 NOTE — PLAN OF CARE
Noted PT recommendation for high intensity therapy. Spoke with Nerissa LOZANO at [facility] about possibility for IPR. Patient is denied for IPR at this time but rehab states they will re-evaluate patient's needs after ortho surgery.

## 2024-02-22 NOTE — PROGRESS NOTES
[facility] Medicine  Progress Note    Patient Name: Paola Ibanez  MRN: [id number]  Patient Class: IP- Inpatient   Admission Date: 2/20/2024  Length of Stay: 2 days  Attending Physician: Libby Norton MD  Primary Care Provider: May Rangel MD        Subjective:     Principal Problem:Atrial fibrillation with RVR        HPI:  This is a 63 y/o  female w/ pmh of a-fib, HTN who presents w/ right arm pain. Patient reports she was in her kitchen today and her floors are uneven and she stumbled and hit her arm on the cabinet; reports having had 9/10 pain but currently reports she's comfortable; denies headache, blurry vision, chest pain, SOB, palpitations, abdominal pain, N/V, dysuria and back pain. Patient is afebrile, tachycardic, hypertensive and saturating ok. Lab work reflects hyponatremia w/ Na of 131. Right humerus x-ray showed a pathologic fracture through the surgical neck of the humerus with cystic component in the proximal humeral diaphysis. Recommend supplement evaluation with CT scan of the upper extremity once the initial traumatic episode has resolved. A right shoulder x-ray showed a pathologic fracture through the surgical neck of humerus with involvement of greater tubercle. Patient received Lopressor 25 mg PO x1 in the ER and her a-fib w/ RVR has converted to sinus rhythm. Patient reports she was on Eliquis before but it caused her to have bloody BM and she hasn't been on it in years.     Overview/Hospital Course:  Patient with history of afib and hypertension presented with right arm pain found to right surgical neck fracture of the humerus.  Patient was initially atrial fibrillation with RVR, received p.o. Lopressor then converted to sinus rhythm.  Initial concern that fracture was pathological, MRI done did not report pathological.  Oncology initially consulted, recommended bone scan that can be done outpatient.  Orthopedic surgery consulted.   Recommended patient follow up outpatient with planned surgery next Tuesday.  Initially patient was discharged, on discharge patient reported being weak and not able to walk properly.  Discharge was discontinued, patient admitted.  PT consulted, recommended inpatient rehab.  Bone scan done showed no evidence of osseous metastatic disease.  Orthopedic surgery plans on surgery Monday 02/26/2024.  We will transferred to [facility] for surgery.    Interval History: Pt reports feeling ok this morning. Hrs elevated but improved after morning lopressor given. Unable to have surgery this week. Plan for remaining inpatient ill surgery on Monday.     Review of Systems   Constitutional:  Negative for diaphoresis.   Respiratory:  Negative for choking and shortness of breath.    Cardiovascular:  Negative for chest pain.     Objective:     Vital Signs (Most Recent):  Temp: 98.5 °F (36.9 °C) (02/22/24 1350)  Pulse: 100 (02/22/24 1350)  Resp: 19 (02/22/24 1350)  BP: 95/68 (02/22/24 1350)  SpO2: (!) 92 % (02/22/24 1350) Vital Signs (24h Range):  Temp:  [98.5 °F (36.9 °C)] 98.5 °F (36.9 °C)  Pulse:  [] 100  Resp:  [3-27] 19  SpO2:  [92 %-99 %] 92 %  BP: ()/(55-92) 95/68     Weight: 102.4 kg (225 lb 12 oz)  Body mass index is 42.66 kg/m².  No intake or output data in the 24 hours ending 02/22/24 1540      Physical Exam  Constitutional:       Appearance: She is not ill-appearing.   Cardiovascular:      Rate and Rhythm: Normal rate and regular rhythm.   Pulmonary:      Effort: No respiratory distress.      Breath sounds: No wheezing.   Musculoskeletal:      Comments: Right arm in sling    Neurological:      General: No focal deficit present.      Mental Status: She is alert and oriented to person, place, and time.      Comments: Chronic right foot drop             Significant Labs: All pertinent labs within the past 24 hours have been reviewed.  CBC:   Recent Labs   Lab 02/20/24  1848 02/21/24  0542 02/22/24  0555   WBC 8.73  6.68 6.90   HGB 12.4 11.1* 10.9*   HCT 38.2 34.4* 34.3*    212 188     CMP:   Recent Labs   Lab 02/20/24  1848 02/21/24  0542 02/22/24  0555   * 135* 135*   K 3.7 4.2 4.1    106 107   CO2 25 26 25   * 111* 109   BUN 10 12 13   CREATININE 0.6 0.6 0.6   CALCIUM 10.3 10.1 10.4   PROT 8.1 6.9 6.8   ALBUMIN 4.1 3.6 3.4*   BILITOT 0.6 0.7 0.8   ALKPHOS 93 80 73   AST 17 15 13   ALT 12 10 10   ANIONGAP 3* 3* 3*       Significant Imaging: I have reviewed all pertinent imaging results/findings within the past 24 hours.    Assessment/Plan:      * Atrial fibrillation with RVR  -Patient received Lopressor 25 mg PO x1 in the ER and her a-fib w/ RVR has converted to sinus rhythm  Cont lopressor   Not on AC given hx of GI bleeds   -Telemetry monitoring      Hyponatremia  -IVF  -Monitor labs  Resolving       Closed fracture of surgical neck of humerus  -Orthopedic Surgery consult, initially recommended sling and op follow up for surgery next week but patient unable to be discharged given physical debility. PT recommends inpatient rehab.  Discussed with surgery, plan for surgery on Monday.  PT/OT  -Analgesic prn  -Monitor vitals  Bone scan showed no osseous metastatic disease      Hypertension  -Home meds and adjust as necessary  -Prn antihypertensive  -Vital sign checks      VTE Risk Mitigation (From admission, onward)           Ordered     Place sequential compression device  Until discontinued         02/20/24 2210     Reason for No Pharmacological VTE Prophylaxis  Once        Comments: NPO for possible surgical intervention   Question:  Reasons:  Answer:  Physician Provided (leave comment)    02/20/24 2209     IP VTE HIGH RISK PATIENT  Once         02/20/24 2209     Place sequential compression device  Until discontinued         02/20/24 2209                    Discharge Planning   EH: 2/22/2024     Code Status: Full Code   Is the patient medically ready for discharge?:     Reason for patient still in  hospital (select all that apply): Patient trending condition  Discharge Plan A: Home   Discharge Delays: None known at this time              Libby Norton MD  Department of Hospital Medicine   [facility]

## 2024-02-22 NOTE — ASSESSMENT & PLAN NOTE
-Orthopedic Surgery consult, initially recommended sling and op follow up for surgery next week but patient unable to be discharged given physical debility. PT recommends inpatient rehab.  Discussed with surgery, plan for surgery on Monday.  PT/OT  -Analgesic prn  -Monitor vitals  Bone scan showed no osseous metastatic disease
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
## 2024-02-22 NOTE — ED NOTES
Pt. Repositioned in bed. Pillow placed under right arm. Pt. Reconnected to monitor. Bed locked and in lowest position. Side rails raised x 2. Pt. Has no other needs at this time. Call light in reach and pt. Instructed to call for assistance.

## 2024-02-22 NOTE — CARE UPDATE
[facility] is at capacity. Plan to transfer by Monday, will remain till then.

## 2024-02-22 NOTE — SUBJECTIVE & OBJECTIVE
Principal Problem:Atrial fibrillation with RVR    Principal Orthopedic Problem: R proximal humerus Fx    Interval History: see note    Review of patient's allergies indicates:   Allergen Reactions    Aspirin Nausea Only     Stomach Pain  Stomach Pain      Lisinopril Other (See Comments)     Cough  Cough      Penicillin g potassium Rash     Rash  Rash         Current Facility-Administered Medications   Medication    acetaminophen tablet 650 mg    acetaminophen tablet 650 mg    aluminum-magnesium hydroxide-simethicone 200-200-20 mg/5 mL suspension 30 mL    dextrose 50% injection 12.5 g    dextrose 50% injection 25 g    glucagon (human recombinant) injection 1 mg    glucose chewable tablet 16 g    glucose chewable tablet 24 g    hydrALAZINE tablet 25 mg    hydroCHLOROthiazide tablet 25 mg    HYDROcodone-acetaminophen 5-325 mg per tablet 1 tablet    losartan tablet 100 mg    magnesium oxide tablet 800 mg    magnesium oxide tablet 800 mg    melatonin tablet 6 mg    metoprolol tartrate (LOPRESSOR) tablet 50 mg    morphine injection 1 mg    naloxone 0.4 mg/mL injection 0.02 mg    ondansetron injection 4 mg    potassium bicarbonate disintegrating tablet 35 mEq    potassium bicarbonate disintegrating tablet 50 mEq    potassium bicarbonate disintegrating tablet 60 mEq    potassium, sodium phosphates 280-160-250 mg packet 2 packet    potassium, sodium phosphates 280-160-250 mg packet 2 packet    potassium, sodium phosphates 280-160-250 mg packet 2 packet    sodium chloride 0.9% flush 2 mL     Current Outpatient Medications   Medication Sig    acetaminophen (TYLENOL) 650 MG TbSR Take 1 tablet (650 mg total) by mouth every 8 (eight) hours. (Patient taking differently: Take 325 mg by mouth every 8 (eight) hours.)    cloNIDine (CATAPRES) 0.1 MG tablet Take 0.1 mg by mouth every 8 (eight) hours as needed.    fluticasone propionate (FLONASE) 50 mcg/actuation nasal spray 1 spray by Nasal route once daily.    hydroCHLOROthiazide  (HYDRODIURIL) 25 MG tablet Take 25 mg by mouth every morning.    losartan (COZAAR) 100 MG tablet Take 1 tablet (100 mg total) by mouth once daily.    metoprolol tartrate (LOPRESSOR) 50 MG tablet Take 1 tablet by mouth twice daily (Patient taking differently: Take 25 mg by mouth 2 (two) times daily.)    potassium chloride SA (K-DUR,KLOR-CON) 20 MEQ tablet Take 20 mEq by mouth daily as needed.    albuterol (PROVENTIL/VENTOLIN HFA) 90 mcg/actuation inhaler Inhale 1-2 puffs into the lungs every 6 (six) hours as needed for Wheezing. Rescue (Patient not taking: Reported on 2/20/2024)    hydrALAZINE (APRESOLINE) 25 MG tablet Take 25 mg by mouth every 8 (eight) hours.    HYDROcodone-acetaminophen (NORCO) 5-325 mg per tablet Take 1 tablet by mouth every 6 (six) hours as needed for Pain.     Objective:     Vital Signs (Most Recent):  Temp: 98.5 °F (36.9 °C) (02/22/24 1350)  Pulse: 100 (02/22/24 1350)  Resp: 19 (02/22/24 1350)  BP: 95/68 (02/22/24 1350)  SpO2: (!) 92 % (02/22/24 1350) Vital Signs (24h Range):  Temp:  [98.5 °F (36.9 °C)] 98.5 °F (36.9 °C)  Pulse:  [] 100  Resp:  [3-27] 19  SpO2:  [92 %-99 %] 92 %  BP: ()/(55-92) 95/68     Weight: 98.9 kg (218 lb)     Body mass index is 41.19 kg/m².    No intake or output data in the 24 hours ending 02/22/24 1509     General    Nursing note and vitals reviewed.  Constitutional: She is oriented to person, place, and time.   Obese   Pulmonary/Chest: Effort normal.   Neurological: She is alert and oriented to person, place, and time.         Right Shoulder Exam     Comments:  RUE in a sling. RUE DNVI.       Significant Labs: CBC:   Recent Labs   Lab 02/20/24  1848 02/21/24  0542 02/22/24  0555   WBC 8.73 6.68 6.90   HGB 12.4 11.1* 10.9*   HCT 38.2 34.4* 34.3*    212 188     CMP:   Recent Labs   Lab 02/20/24  1848 02/21/24  0542 02/22/24  0555   * 135* 135*   K 3.7 4.2 4.1    106 107   CO2 25 26 25   * 111* 109   BUN 10 12 13   CREATININE 0.6  0.6 0.6   CALCIUM 10.3 10.1 10.4   PROT 8.1 6.9 6.8   ALBUMIN 4.1 3.6 3.4*   BILITOT 0.6 0.7 0.8   ALKPHOS 93 80 73   AST 17 15 13   ALT 12 10 10   ANIONGAP 3* 3* 3*     All pertinent labs within the past 24 hours have been reviewed.    Significant Imaging: X-Ray: I have reviewed all pertinent results/findings and my personal findings are:  X-ray metastatic survey:  No convincing radiographic evidence of osseous metastasis.     Bone scan or PET/CT would provide a more sensitive assessment if there strong clinical concern.     Right shoulder was not imaged due to patient factors/known fracture.    Bone scan: no metastatic disease    Right shld MRI:  Acute comminuted fracture involving the surgical neck of the right humerus. Mild medial and anterior displacement of the minimally angulated and impacted fracture fragments. Fracture extends through the greater tuberosity, with intact rotator cuff tendons inserting upon the greater tuberosity fracture fragment. The inferior glenohumeral ligament appears intact. Glenohumeral joint effusion. No fracture extension through the humeral head articular surface. Heterogeneous signal intensity at the fracture site consistent with hemorrhage/hematoma.     Marked subcutaneous edema of the shoulder, as well as diffuse intramuscular edema within the deltoid, likely representing contusion. There is also intramuscular edema within the teres minor muscle.     Moderate AC joint osteoarthrosis. Small foci of subchondral edema within the glenoid, likely secondary to glenohumeral osteoarthrosis.   Unable to answer due to medical condition/unresponsive/etc...

## 2024-02-22 NOTE — PLAN OF CARE
Problem: Physical Therapy  Goal: Physical Therapy Goal  Description: 1. Supine to sit with Stand-by Assistance  2. Sit to stand transfer with Stand-by Assistance  3.. Bed to chair transfer with Supervision using Javy Walker  4. Gait  x 50 feet with Minimal Assistance using Javy Walker.     Outcome: Ongoing, Progressing

## 2024-02-22 NOTE — ASSESSMENT & PLAN NOTE
MADDY in sling at all time  MADDY NWB  PT for mobility and LE endurance  OT for ADLs    Plan is for the patient to be admitted and transferred to [facility] prior to Monday and to have R humerus ORIF on Monday with Dr. Gonzalez  - She cannot safely be DC'd home at this time

## 2024-02-23 LAB
ALBUMIN SERPL BCP-MCNC: 3 G/DL (ref 3.5–5.2)
ALP SERPL-CCNC: 64 U/L (ref 55–135)
ALT SERPL W/O P-5'-P-CCNC: 9 U/L (ref 10–44)
ANION GAP SERPL CALC-SCNC: 2 MMOL/L (ref 8–16)
AST SERPL-CCNC: 12 U/L (ref 10–40)
BASOPHILS # BLD AUTO: 0.03 K/UL (ref 0–0.2)
BASOPHILS NFR BLD: 0.5 % (ref 0–1.9)
BILIRUB SERPL-MCNC: 0.6 MG/DL (ref 0.1–1)
BUN SERPL-MCNC: 14 MG/DL (ref 8–23)
CALCIUM SERPL-MCNC: 9.9 MG/DL (ref 8.7–10.5)
CANCER AG125 SERPL-ACNC: 2.6 U/ML (ref 0–38.1)
CHLORIDE SERPL-SCNC: 106 MMOL/L (ref 95–110)
CO2 SERPL-SCNC: 27 MMOL/L (ref 23–29)
CREAT SERPL-MCNC: 0.6 MG/DL (ref 0.5–1.4)
DIFFERENTIAL METHOD BLD: ABNORMAL
EOSINOPHIL # BLD AUTO: 0.1 K/UL (ref 0–0.5)
EOSINOPHIL NFR BLD: 1 % (ref 0–8)
ERYTHROCYTE [DISTWIDTH] IN BLOOD BY AUTOMATED COUNT: 12.7 % (ref 11.5–14.5)
EST. GFR  (NO RACE VARIABLE): >60 ML/MIN/1.73 M^2
GLUCOSE SERPL-MCNC: 106 MG/DL (ref 70–110)
HCT VFR BLD AUTO: 33.1 % (ref 37–48.5)
HGB BLD-MCNC: 10.6 G/DL (ref 12–16)
IGA SERPL-MCNC: 62 MG/DL (ref 87–352)
IGA SERPL-MCNC: 62 MG/DL (ref 87–352)
IGG SERPL-MCNC: 2070 MG/DL (ref 586–1602)
IGG SERPL-MCNC: 2070 MG/DL (ref 586–1602)
IGM SERPL-MCNC: 22 MG/DL (ref 26–217)
IGM SERPL-MCNC: 22 MG/DL (ref 26–217)
IMM GRANULOCYTES # BLD AUTO: 0.02 K/UL (ref 0–0.04)
IMM GRANULOCYTES NFR BLD AUTO: 0.3 % (ref 0–0.5)
KAPPA LC FREE SER-MCNC: 26.5 MG/L (ref 3.3–19.4)
KAPPA LC FREE/LAMBDA FREE SER: 3.58 {RATIO} (ref 0.26–1.65)
LAMBDA LC FREE SERPL-MCNC: 7.4 MG/L (ref 5.7–26.3)
LYMPHOCYTES # BLD AUTO: 1.9 K/UL (ref 1–4.8)
LYMPHOCYTES NFR BLD: 31.5 % (ref 18–48)
MAGNESIUM SERPL-MCNC: 1.9 MG/DL (ref 1.6–2.6)
MCH RBC QN AUTO: 31.7 PG (ref 27–31)
MCHC RBC AUTO-ENTMCNC: 32 G/DL (ref 32–36)
MCV RBC AUTO: 99 FL (ref 82–98)
MONOCYTES # BLD AUTO: 0.7 K/UL (ref 0.3–1)
MONOCYTES NFR BLD: 11.6 % (ref 4–15)
NEUTROPHILS # BLD AUTO: 3.3 K/UL (ref 1.8–7.7)
NEUTROPHILS NFR BLD: 55.1 % (ref 38–73)
NRBC BLD-RTO: 0 /100 WBC
PLATELET # BLD AUTO: 176 K/UL (ref 150–450)
PMV BLD AUTO: 10.8 FL (ref 9.2–12.9)
POTASSIUM SERPL-SCNC: 3.8 MMOL/L (ref 3.5–5.1)
PROT PATTERN SERPL IFE-IMP: ABNORMAL
PROT SERPL-MCNC: 6.1 G/DL (ref 6–8.4)
RBC # BLD AUTO: 3.34 M/UL (ref 4–5.4)
SODIUM SERPL-SCNC: 135 MMOL/L (ref 136–145)
WBC # BLD AUTO: 5.93 K/UL (ref 3.9–12.7)

## 2024-02-23 PROCEDURE — 85025 COMPLETE CBC W/AUTO DIFF WBC: CPT | Performed by: INTERNAL MEDICINE

## 2024-02-23 PROCEDURE — 36415 COLL VENOUS BLD VENIPUNCTURE: CPT | Performed by: INTERNAL MEDICINE

## 2024-02-23 PROCEDURE — 11000001 HC ACUTE MED/SURG PRIVATE ROOM

## 2024-02-23 PROCEDURE — 80053 COMPREHEN METABOLIC PANEL: CPT | Performed by: INTERNAL MEDICINE

## 2024-02-23 PROCEDURE — 25000003 PHARM REV CODE 250

## 2024-02-23 PROCEDURE — 97116 GAIT TRAINING THERAPY: CPT

## 2024-02-23 PROCEDURE — 99223 1ST HOSP IP/OBS HIGH 75: CPT | Mod: ,,, | Performed by: GENERAL PRACTICE

## 2024-02-23 PROCEDURE — 25000003 PHARM REV CODE 250: Performed by: INTERNAL MEDICINE

## 2024-02-23 PROCEDURE — 83735 ASSAY OF MAGNESIUM: CPT | Performed by: INTERNAL MEDICINE

## 2024-02-23 PROCEDURE — 97535 SELF CARE MNGMENT TRAINING: CPT

## 2024-02-23 PROCEDURE — 97166 OT EVAL MOD COMPLEX 45 MIN: CPT

## 2024-02-23 PROCEDURE — 63600175 PHARM REV CODE 636 W HCPCS: Performed by: STUDENT IN AN ORGANIZED HEALTH CARE EDUCATION/TRAINING PROGRAM

## 2024-02-23 PROCEDURE — 94760 N-INVAS EAR/PLS OXIMETRY 1: CPT

## 2024-02-23 PROCEDURE — 97530 THERAPEUTIC ACTIVITIES: CPT

## 2024-02-23 RX ORDER — AMOXICILLIN 250 MG
1 CAPSULE ORAL DAILY
Status: DISCONTINUED | OUTPATIENT
Start: 2024-02-23 | End: 2024-02-28 | Stop reason: HOSPADM

## 2024-02-23 RX ORDER — AMOXICILLIN 250 MG
1 CAPSULE ORAL DAILY PRN
Status: DISCONTINUED | OUTPATIENT
Start: 2024-02-23 | End: 2024-02-23

## 2024-02-23 RX ORDER — POLYETHYLENE GLYCOL 3350 17 G/17G
17 POWDER, FOR SOLUTION ORAL DAILY PRN
Status: DISCONTINUED | OUTPATIENT
Start: 2024-02-23 | End: 2024-02-28 | Stop reason: HOSPADM

## 2024-02-23 RX ORDER — POLYETHYLENE GLYCOL 3350 17 G/17G
17 POWDER, FOR SOLUTION ORAL DAILY
Status: DISCONTINUED | OUTPATIENT
Start: 2024-02-23 | End: 2024-02-23

## 2024-02-23 RX ADMIN — HYDROCODONE BITARTRATE AND ACETAMINOPHEN 1 TABLET: 5; 325 TABLET ORAL at 10:02

## 2024-02-23 RX ADMIN — LOSARTAN POTASSIUM 100 MG: 50 TABLET, FILM COATED ORAL at 09:02

## 2024-02-23 RX ADMIN — SENNOSIDES AND DOCUSATE SODIUM 1 TABLET: 8.6; 5 TABLET ORAL at 11:02

## 2024-02-23 RX ADMIN — HYDROCODONE BITARTRATE AND ACETAMINOPHEN 1 TABLET: 5; 325 TABLET ORAL at 03:02

## 2024-02-23 RX ADMIN — SODIUM CHLORIDE, POTASSIUM CHLORIDE, SODIUM LACTATE AND CALCIUM CHLORIDE: 600; 310; 30; 20 INJECTION, SOLUTION INTRAVENOUS at 01:02

## 2024-02-23 RX ADMIN — METOPROLOL TARTRATE 50 MG: 50 TABLET, FILM COATED ORAL at 09:02

## 2024-02-23 RX ADMIN — HYDRALAZINE HYDROCHLORIDE 25 MG: 25 TABLET ORAL at 01:02

## 2024-02-23 RX ADMIN — SODIUM CHLORIDE, POTASSIUM CHLORIDE, SODIUM LACTATE AND CALCIUM CHLORIDE: 600; 310; 30; 20 INJECTION, SOLUTION INTRAVENOUS at 09:02

## 2024-02-23 RX ADMIN — HYDRALAZINE HYDROCHLORIDE 25 MG: 25 TABLET ORAL at 10:02

## 2024-02-23 RX ADMIN — METOPROLOL TARTRATE 50 MG: 50 TABLET, FILM COATED ORAL at 10:02

## 2024-02-23 NOTE — CONSULTS
"[facility]  Department of Cardiology  Consult Note      PATIENT NAME: Paola Ibanez  MRN: [id number]  TODAY'S DATE: 02/23/2024  ADMIT DATE: 2/20/2024                          CONSULT REQUESTED BY: Nabor Brito MD    SUBJECTIVE     PRINCIPAL PROBLEM: Atrial fibrillation with RVR      REASON FOR CONSULT:  "Afib RVR, humerus fx"      HPI:  Pt is a 64 year old female with pmh of paroxysmal Afib and HTN who presents to the hospital with complaints of a fall. She appears to have broken her right humerus - will need surgery - looking like possibly Monday at [facility]? Her arm remains in sling and she denies any chest pain/SOB at this time. She is currently in Afib on monitor with rates stable in the 80s. She is followed by Dr. Enciso outpatient cardiology. After reviewing past hospital medicine notes and speaking with pt, it also appears she has not been on Eliquis for "years" as it caused her to have some GI issues. She currently just takes Lopressor 25 mg BID for her Afib per pt report.     Per hospital medicine notes:  This is a 65 y/o  female w/ pmh of a-fib, HTN who presents w/ right arm pain. Patient reports she was in her kitchen today and her floors are uneven and she stumbled and hit her arm on the cabinet; reports having had 9/10 pain but currently reports she's comfortable; denies headache, blurry vision, chest pain, SOB, palpitations, abdominal pain, N/V, dysuria and back pain. Patient is afebrile, tachycardic, hypertensive and saturating ok. Lab work reflects hyponatremia w/ Na of 131. Right humerus x-ray showed a pathologic fracture through the surgical neck of the humerus with cystic component in the proximal humeral diaphysis. Recommend supplement evaluation with CT scan of the upper extremity once the initial traumatic episode has resolved. A right shoulder x-ray showed a pathologic fracture through the surgical neck of humerus with involvement of greater tubercle. " Patient received Lopressor 25 mg PO x1 in the ER and her a-fib w/ RVR has converted to sinus rhythm. Patient reports she was on Eliquis before but it caused her to have bloody BM and she hasn't been on it in years.      Overview/Hospital Course:  Patient with history of afib and hypertension presented with right arm pain found to right surgical neck fracture of the humerus.  Patient was initially atrial fibrillation with RVR, received p.o. Lopressor then converted to sinus rhythm.  Initial concern that fracture was pathological, MRI done did not report pathological.  Oncology initially consulted, recommended bone scan that can be done outpatient.  Orthopedic surgery consulted.  Recommended patient follow up outpatient with planned surgery next Tuesday.  Initially patient was discharged, on discharge patient reported being weak and not able to walk properly.  Discharge was discontinued, patient admitted.  PT consulted, recommended inpatient rehab.  Bone scan done showed no evidence of osseous metastatic disease.  Orthopedic surgery plans on surgery Monday 02/26/2024.  We will transferred to [facility] for surgery.        Review of patient's allergies indicates:   Allergen Reactions    Aspirin Nausea Only     Stomach Pain  Stomach Pain      Lisinopril Other (See Comments)     Cough  Cough      Penicillin g potassium Rash     Rash  Rash         No past medical history on file.  Past Surgical History:   Procedure Laterality Date    HYSTERECTOMY       Social History     Tobacco Use    Smoking status: Never    Smokeless tobacco: Never   Substance Use Topics    Alcohol use: Not Currently    Drug use: Never        REVIEW OF SYSTEMS    As mentioned in HPI    OBJECTIVE     VITAL SIGNS (Most Recent)  Temp: 98.9 °F (37.2 °C) (02/23/24 0834)  Pulse: 85 (02/23/24 0834)  Resp: 18 (02/23/24 0834)  BP: 128/77 (02/23/24 0834)  SpO2: 96 % (02/23/24 0834)    VENTILATION STATUS  Resp: 18 (02/23/24 0834)  SpO2: 96 % (02/23/24 0834)            I & O (Last 24H):  Intake/Output Summary (Last 24 hours) at 2/23/2024 0901  Last data filed at 2/23/2024 0652  Gross per 24 hour   Intake 360 ml   Output 400 ml   Net -40 ml       WEIGHTS  Wt Readings from Last 3 Encounters:   02/22/24 1534 102.4 kg (225 lb 12 oz)   02/20/24 1751 98.9 kg (218 lb)   03/17/23 1741 111.1 kg (245 lb)   04/12/21 1601 111.1 kg (245 lb)       PHYSICAL EXAM    CONSTITUTIONAL: NAD, obese   HEENT: Normocephalic. No pallor  NECK: no JVD  LUNGS: CTA b/l  HEART: Afib in the 80s, S1, S2 normal, no murmur   ABDOMEN: soft, non-tender, bowel sounds normal  EXTREMITIES: fracture to right humerus, in sling   SKIN: No rash  NEURO: AAO X 3  PSYCH: normal affect     HOME MEDICATIONS:  No current facility-administered medications on file prior to encounter.     Current Outpatient Medications on File Prior to Encounter   Medication Sig Dispense Refill    acetaminophen (TYLENOL) 650 MG TbSR Take 1 tablet (650 mg total) by mouth every 8 (eight) hours. (Patient taking differently: Take 325 mg by mouth every 8 (eight) hours.) 30 tablet 0    cloNIDine (CATAPRES) 0.1 MG tablet Take 0.1 mg by mouth every 8 (eight) hours as needed.      fluticasone propionate (FLONASE) 50 mcg/actuation nasal spray 1 spray by Nasal route once daily.      hydroCHLOROthiazide (HYDRODIURIL) 25 MG tablet Take 25 mg by mouth every morning.      losartan (COZAAR) 100 MG tablet Take 1 tablet (100 mg total) by mouth once daily. 30 tablet 5    metoprolol tartrate (LOPRESSOR) 50 MG tablet Take 1 tablet by mouth twice daily (Patient taking differently: Take 25 mg by mouth 2 (two) times daily.) 180 tablet 0    potassium chloride SA (K-DUR,KLOR-CON) 20 MEQ tablet Take 20 mEq by mouth daily as needed.      albuterol (PROVENTIL/VENTOLIN HFA) 90 mcg/actuation inhaler Inhale 1-2 puffs into the lungs every 6 (six) hours as needed for Wheezing. Rescue (Patient not taking: Reported on 2/20/2024) 18 g 0    hydrALAZINE (APRESOLINE) 25 MG tablet  "Take 25 mg by mouth every 8 (eight) hours.         SCHEDULED MEDS:   hydrALAZINE  25 mg Oral Q8H    hydroCHLOROthiazide  25 mg Oral QAM    losartan  100 mg Oral Daily    metoprolol tartrate  50 mg Oral BID       CONTINUOUS INFUSIONS:   lactated ringers 100 mL/hr at 02/22/24 1831       PRN MEDS:acetaminophen, acetaminophen, aluminum-magnesium hydroxide-simethicone, dextrose 50% in water (D50W), dextrose 50% in water (D50W), glucagon (human recombinant), glucose, glucose, HYDROcodone-acetaminophen, magnesium oxide, magnesium oxide, melatonin, morphine, naloxone, ondansetron, potassium bicarbonate, potassium bicarbonate, potassium bicarbonate, potassium, sodium phosphates, potassium, sodium phosphates, potassium, sodium phosphates, sodium chloride 0.9%    LABS AND DIAGNOSTICS     CBC LAST 3 DAYS  Recent Labs   Lab 02/21/24  0542 02/22/24  0555 02/23/24  0553   WBC 6.68 6.90 5.93   RBC 3.45* 3.46* 3.34*   HGB 11.1* 10.9* 10.6*   HCT 34.4* 34.3* 33.1*   * 99* 99*   MCH 32.2* 31.5* 31.7*   MCHC 32.3 31.8* 32.0   RDW 12.8 13.0 12.7    188 176   MPV 10.6 10.7 10.8   GRAN 66.0  4.4 64.1  4.4 55.1  3.3   LYMPH 23.4  1.6 24.8  1.7 31.5  1.9   MONO 10.0  0.7 9.9  0.7 11.6  0.7   BASO 0.02 0.03 0.03   NRBC 0 0 0       COAGULATION LAST 3 DAYS  No results for input(s): "LABPT", "INR", "APTT" in the last 168 hours.    CHEMISTRY LAST 3 DAYS  Recent Labs   Lab 02/21/24  0542 02/22/24  0555 02/23/24  0553   * 135* 135*   K 4.2 4.1 3.8    107 106   CO2 26 25 27   ANIONGAP 3* 3* 2*   BUN 12 13 14   CREATININE 0.6 0.6 0.6   * 109 106   CALCIUM 10.1 10.4 9.9   MG 1.8 1.9 1.9   ALBUMIN 3.6 3.4* 3.0*   PROT 6.9 6.8 6.1   ALKPHOS 80 73 64   ALT 10 10 9*   AST 15 13 12   BILITOT 0.7 0.8 0.6       CARDIAC PROFILE LAST 3 DAYS  Recent Labs   Lab 02/20/24  1848   *   TROPONINIHS 8.4       ENDOCRINE LAST 3 DAYS  No results for input(s): "TSH", "PROCAL" in the last 168 hours.    LAST ARTERIAL BLOOD " "GAS  ABG  No results for input(s): "PH", "PO2", "PCO2", "HCO3", "BE" in the last 168 hours.    LAST 7 DAYS MICROBIOLOGY   Microbiology Results (last 7 days)       ** No results found for the last 168 hours. **            MOST RECENT IMAGING  NM Bone Scan Whole Body  CMS MANDATED QUALITY DATA- BONE SCAN - 147    There are no plain radiographs for correlation purposes on this patient.    CLINICAL HISTORY:  64 years (1960) Female evaluiate for bone cancer/mets 28mCi 99mTc MDP; LT ARM IV INJ; ; Pt unwilling, unable to remove right arm from across her chest, complains that it is very painful.; ; DX: evaluate for bone cancer/mets    TECHNIQUE:  NM BONE SCAN WHOLE BODY. 1 images obtained. Approximately 3 hours after intravenous injection of 28 mCi Tc-99m MDP, anterior and posterior whole body images were acquired.    COMPARISON:  None available.    FINDINGS:  Mild scattered MDP activity seen in the shoulders, knees and spine compatible with degenerative change. The patient's arms overlap there are chest which may reduce sensitivity.    No focal abnormal uptake is seen to specifically suggest osseous metastatic disease. There are punctate foci of likely urine contamination in the midline pelvis.    The kidneys and bladder are visualized.    Tracer distribution is otherwise physiologic.    IMPRESSION:  No evidence of osseous metastatic disease.    Electronically signed by:  Paulino Armas MD  02/22/2024 10:18 AM CST Workstation: [id number]      ECHOCARDIOGRAM RESULTS (last 5)  No results found for this or any previous visit.      CURRENT/PREVIOUS VISIT EKG  Results for orders placed or performed during the hospital encounter of 02/20/24   EKG 12-lead    Collection Time: 02/22/24 10:47 AM   Result Value Ref Range    QRS Duration 118 ms    OHS QTC Calculation 465 ms    Narrative    Test Reason : I48.91,    Vent. Rate : 124 BPM     Atrial Rate : 087 BPM     P-R Int : 000 ms          QRS Dur : 118 ms      QT Int : 324 " ms       P-R-T Axes : 000 -64 059 degrees     QTc Int : 465 ms    Atrial fibrillation with rapid ventricular response  Left anterior fascicular block  LVH with QRS widening  Nonspecific ST abnormality  Abnormal ECG  When compared with ECG of 20-FEB-2024 22:28,  Atrial fibrillation has replaced Sinus rhythm  Vent. rate has increased BY  48 BPM    Referred By: [email or web address]   SELF           Confirmed By:            ASSESSMENT/PLAN:     Active Hospital Problems    Diagnosis    *Atrial fibrillation with RVR    Closed fracture of surgical neck of humerus    Hyponatremia    Hypertension       ASSESSMENT & PLAN:     Paroxysmal Afib with RVR   Closed fracture of surgical neck of humerus   Hyponatremia   HTN   Obesity       RECOMMENDATIONS:    Pt's rate appears stable for now, for now, just focus on rate control.   Continue on home Lopressor 50 mg BID.  Can address pt's Afib outpatient or after surgery - her orthopedic surgery is more important at this time.    Continue current HTN regimen - hydralazine 25 mg Q8, HCTZ 25 mg daily, and Losartan 100 mg daily.  Will be available PRN over the weekend, thank you for the consult. Please reach out if needed.       Mary Barrera NP  Department of Cardiology  Date of Service: 02/23/2024        I have personally interviewed and examined the patient, I have reviewed the Nurse Practitioner's history and physical, assessment, and plan. I agree with the findings and plan.    THE PATIENT HAS ONSET OF ATRIAL FIBRILLATION.  RECOMMEND RATE CONTROL AND ANTICOAGULATION AT THE CURRENT TIME    OKAY TO PROCEED WITH THE SURGERY  Guevara Ch M.D.  Department of Cardiology  Date of Service: 02/23/2024  9:01 AM

## 2024-02-23 NOTE — SUBJECTIVE & OBJECTIVE
Interval History:  Patient seen and examined.  Overnight notes reviewed.  Patient reports pain well controlled p.r.n. pain medications.  Anticipate transfer to [facility] for surgery with Dr. Gonzalez tentatively planned for Monday 2/26.    Review of Systems   Respiratory:  Negative for shortness of breath.    Cardiovascular:  Negative for chest pain.   Gastrointestinal:  Negative for abdominal pain.   Musculoskeletal:  Positive for arthralgias.     Objective:     Vital Signs (Most Recent):  Temp: 98.8 °F (37.1 °C) (02/23/24 1202)  Pulse: 86 (02/23/24 1202)  Resp: 18 (02/23/24 1202)  BP: 114/77 (02/23/24 1202)  SpO2: 95 % (02/23/24 1202) Vital Signs (24h Range):  Temp:  [97.2 °F (36.2 °C)-99.3 °F (37.4 °C)] 98.8 °F (37.1 °C)  Pulse:  [] 86  Resp:  [17-20] 18  SpO2:  [92 %-98 %] 95 %  BP: ()/(53-77) 114/77     Weight: 102.4 kg (225 lb 12 oz)  Body mass index is 42.66 kg/m².    Intake/Output Summary (Last 24 hours) at 2/23/2024 1458  Last data filed at 2/23/2024 1400  Gross per 24 hour   Intake 1050 ml   Output 400 ml   Net 650 ml         Physical Exam  Vitals and nursing note reviewed.   Constitutional:       General: She is not in acute distress.     Appearance: Normal appearance. She is obese.   HENT:      Head: Normocephalic and atraumatic.      Mouth/Throat:      Mouth: Mucous membranes are moist.      Pharynx: Oropharynx is clear.   Eyes:      Extraocular Movements: Extraocular movements intact.      Pupils: Pupils are equal, round, and reactive to light.   Cardiovascular:      Rate and Rhythm: Normal rate. Rhythm irregular.      Pulses: Normal pulses.      Heart sounds: Normal heart sounds.   Pulmonary:      Effort: Pulmonary effort is normal.      Breath sounds: Normal breath sounds.   Abdominal:      General: Abdomen is flat. Bowel sounds are normal.      Palpations: Abdomen is soft.      Tenderness: There is no abdominal tenderness. There is no guarding or rebound.   Musculoskeletal:      Comments:  Right arm in sling.  Neurovascularly intact   Skin:     General: Skin is warm.      Capillary Refill: Capillary refill takes less than 2 seconds.   Neurological:      General: No focal deficit present.      Mental Status: She is alert and oriented to person, place, and time. Mental status is at baseline.   Psychiatric:         Mood and Affect: Mood normal.         Behavior: Behavior normal.             Significant Labs: All pertinent labs within the past 24 hours have been reviewed.  CBC:   Recent Labs   Lab 02/22/24  0555 02/23/24  0553   WBC 6.90 5.93   HGB 10.9* 10.6*   HCT 34.3* 33.1*    176     CMP:   Recent Labs   Lab 02/22/24  0555 02/23/24  0553   * 135*   K 4.1 3.8    106   CO2 25 27    106   BUN 13 14   CREATININE 0.6 0.6   CALCIUM 10.4 9.9   PROT 6.8 6.1   ALBUMIN 3.4* 3.0*   BILITOT 0.8 0.6   ALKPHOS 73 64   AST 13 12   ALT 10 9*   ANIONGAP 3* 2*       Significant Imaging: I have reviewed all pertinent imaging results/findings within the past 24 hours.

## 2024-02-23 NOTE — ASSESSMENT & PLAN NOTE
-Orthopedic Surgery consult, initially recommended sling and op follow up for surgery next week but patient unable to be discharged given physical debility. PT recommends inpatient rehab.  Discussed with surgery, plan for surgery on Monday.  PT/OT  -Analgesic prn  -Monitor vitals  Bone scan showed no osseous metastatic disease  Patient updated on plan for transfer to [facility] for orthopedic surgery planned for Monday
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
## 2024-02-23 NOTE — PROGRESS NOTES
[facility]  Orthopedics  Progress Note    Patient Name: Paola Ibanez  MRN: [id number]  Admission Date: 2/20/2024  Hospital Length of Stay: 3 days  Attending Provider: No att. providers found  Primary Care Provider: May Rangel MD    Subjective:     Principal Problem:Atrial fibrillation with RVR    Principal Orthopedic Problem: R proximal humerus Fx    Interval History: see note    Review of patient's allergies indicates:   Allergen Reactions    Aspirin Nausea Only     Stomach Pain  Stomach Pain      Lisinopril Other (See Comments)     Cough  Cough      Penicillin g potassium Rash     Rash  Rash         Current Facility-Administered Medications   Medication    acetaminophen tablet 650 mg    acetaminophen tablet 650 mg    aluminum-magnesium hydroxide-simethicone 200-200-20 mg/5 mL suspension 30 mL    dextrose 50% injection 12.5 g    dextrose 50% injection 25 g    glucagon (human recombinant) injection 1 mg    glucose chewable tablet 16 g    glucose chewable tablet 24 g    hydrALAZINE tablet 25 mg    hydroCHLOROthiazide tablet 25 mg    HYDROcodone-acetaminophen 5-325 mg per tablet 1 tablet    lactated ringers infusion    losartan tablet 100 mg    magnesium oxide tablet 800 mg    magnesium oxide tablet 800 mg    melatonin tablet 6 mg    metoprolol tartrate (LOPRESSOR) tablet 50 mg    morphine injection 1 mg    naloxone 0.4 mg/mL injection 0.02 mg    ondansetron injection 4 mg    potassium bicarbonate disintegrating tablet 35 mEq    potassium bicarbonate disintegrating tablet 50 mEq    potassium bicarbonate disintegrating tablet 60 mEq    potassium, sodium phosphates 280-160-250 mg packet 2 packet    potassium, sodium phosphates 280-160-250 mg packet 2 packet    potassium, sodium phosphates 280-160-250 mg packet 2 packet    sodium chloride 0.9% flush 2 mL     Objective:     Vital Signs (Most Recent):  Temp: 97.2 °F (36.2 °C) (02/23/24 0300)  Pulse: 80 (02/23/24 0300)  Resp: 18 (02/23/24  "0300)  BP: 106/64 (02/23/24 0300)  SpO2: (!) 92 % (02/23/24 0300) Vital Signs (24h Range):  Temp:  [97.2 °F (36.2 °C)-99.3 °F (37.4 °C)] 97.2 °F (36.2 °C)  Pulse:  [] 80  Resp:  [3-27] 18  SpO2:  [92 %-99 %] 92 %  BP: ()/(53-92) 106/64     Weight: 102.4 kg (225 lb 12 oz)  Height: 5' 1" (154.9 cm)  Body mass index is 42.66 kg/m².      Intake/Output Summary (Last 24 hours) at 2/23/2024 0819  Last data filed at 2/23/2024 0652  Gross per 24 hour   Intake 360 ml   Output 400 ml   Net -40 ml       General    Nursing note and vitals reviewed.  Constitutional: She is oriented to person, place, and time.   Obese   Pulmonary/Chest: Effort normal.   Neurological: She is alert and oriented to person, place, and time.         Right Shoulder Exam     Comments:  RUE in a sling. RUE DNVI.       Significant Labs: CBC:   Recent Labs   Lab 02/22/24  0555 02/23/24  0553   WBC 6.90 5.93   HGB 10.9* 10.6*   HCT 34.3* 33.1*    176       CMP:   Recent Labs   Lab 02/22/24  0555 02/23/24  0553   * 135*   K 4.1 3.8    106   CO2 25 27    106   BUN 13 14   CREATININE 0.6 0.6   CALCIUM 10.4 9.9   PROT 6.8 6.1   ALBUMIN 3.4* 3.0*   BILITOT 0.8 0.6   ALKPHOS 73 64   AST 13 12   ALT 10 9*   ANIONGAP 3* 2*       All pertinent labs within the past 24 hours have been reviewed.    Significant Imaging: X-Ray: I have reviewed all pertinent results/findings and my personal findings are:  X-ray metastatic survey:  No convincing radiographic evidence of osseous metastasis.     Bone scan or PET/CT would provide a more sensitive assessment if there strong clinical concern.     Right shoulder was not imaged due to patient factors/known fracture.    Bone scan: no metastatic disease    Right shld MRI:  Acute comminuted fracture involving the surgical neck of the right humerus. Mild medial and anterior displacement of the minimally angulated and impacted fracture fragments. Fracture extends through the greater tuberosity, " with intact rotator cuff tendons inserting upon the greater tuberosity fracture fragment. The inferior glenohumeral ligament appears intact. Glenohumeral joint effusion. No fracture extension through the humeral head articular surface. Heterogeneous signal intensity at the fracture site consistent with hemorrhage/hematoma.     Marked subcutaneous edema of the shoulder, as well as diffuse intramuscular edema within the deltoid, likely representing contusion. There is also intramuscular edema within the teres minor muscle.     Moderate AC joint osteoarthrosis. Small foci of subchondral edema within the glenoid, likely secondary to glenohumeral osteoarthrosis.  Assessment/Plan:     Hyponatremia  Humerus Fx appears pathologic. Reviewed films with Dr. Gonzalez. Radiologist agrees.    Dr. Gonzalez recommends MRI of R shld and R humerus - no contrast  Oncology referral  No surgery today - pt may eat.    Closed fracture of surgical neck of humerus  RUE in sling at all time  RUE NWB  PT for mobility and LE endurance  OT for ADLs    Plan is for the patient to be transferred to [facility] prior to Monday and to have R humerus ORIF on Monday with Dr. Gonzalez  - She cannot safely be DC'd home at this time          RAYMUNDO PECK  Orthopedics  [facility]

## 2024-02-23 NOTE — ASSESSMENT & PLAN NOTE
-Patient received Lopressor 25 mg PO x1 in the ER and her a-fib w/ RVR has converted to sinus rhythm  Cont lopressor   Not on AC given hx of GI bleeds   -Telemetry monitoring  2/23:  Patient AFib rate control with heart rate ranging 80s to 90s; continue Lopressor

## 2024-02-23 NOTE — ASSESSMENT & PLAN NOTE
MADDY in sling at all time  MADDY NWB  PT for mobility and LE endurance  OT for ADLs    Plan is for the patient to be transferred to [facility] prior to Monday and to have R humerus ORIF on Monday with Dr. Gonzalez  - She cannot safely be DC'd home at this time

## 2024-02-23 NOTE — PT/OT/SLP PROGRESS
Physical Therapy Treatment    Patient Name:  Paola Ibanez   MRN:  [id number]    Recommendations:     Discharge Recommendations: High Intensity Therapy  Discharge Equipment Recommendations: walker, jerad, other (see comments) (R AFO)  Barriers to discharge:  increased level of assist needed    Assessment:     Paola Ibanez is a 64 y.o. female admitted with a medical diagnosis of Atrial fibrillation with RVR.  She presents with the following impairments/functional limitations: weakness, impaired endurance, impaired self care skills, impaired functional mobility, gait instability, impaired balance, decreased coordination, decreased upper extremity function, decreased lower extremity function, decreased safety awareness, pain, abnormal tone, edema, impaired cardiopulmonary response to activity. Pt A & Ox4 and agreeable to PT.  She expresses concern regarding pain in R shoulder and fear of falling due to weakness R LE.  Pt requires maxA for bed mobility with caution taken to support below scapular area per pt request. Mod Assist    Rehab Prognosis: Fair; patient would benefit from acute skilled PT services to address these deficits and reach maximum level of function.    Recent Surgery: * No surgery found *      Plan:     During this hospitalization, patient to be seen daily to address the identified rehab impairments via gait training, therapeutic activities, therapeutic exercises and progress toward the following goals:    Plan of Care Expires:  03/22/24    Subjective     Chief Complaint: pain R shoulder and anxiety regarding about possibility of pain with movement  Patient/Family Comments/goals: get stronger, feel better  Pain/Comfort:  Pain Rating 1: other (see comments) (did not rate)  Location - Side 1: Right  Location 1: shoulder  Pain Addressed 1: Reposition, Cessation of Activity, Nurse notified, Distraction      Objective:     Communicated with RN prior to session.  Patient found HOB elevated with telemetry,  peripheral IV, PureWick, blood pressure cuff upon PT entry to room.     General Precautions: Standard, fall  Orthopedic Precautions:    Braces: UE Sling  Respiratory Status: Room air     Functional Mobility:  Bed Mobility:     Supine to Sit: maximal assistance and of 2 persons  Transfers:     Sit to Stand:  moderate assistance with hemiwalker  Bed to Chair: moderate assistance, of 2 persons, and slow/small side steps with  hemiwalker  using  Step Transfer  Gait: 7-8 small side steps towards weak side with modA for abduction of R hip      AM-PAC 6 CLICK MOBILITY  Turning over in bed (including adjusting bedclothes, sheets and blankets)?: 2  Sitting down on and standing up from a chair with arms (e.g., wheelchair, bedside commode, etc.): 2  Moving from lying on back to sitting on the side of the bed?: 2  Moving to and from a bed to a chair (including a wheelchair)?: 2  Need to walk in hospital room?: 1  Climbing 3-5 steps with a railing?: 1  Basic Mobility Total Score: 10       Treatment & Education:  Pt was educated on the following: call light use, importance of OOB activity and functional mobility to negate the negative effects of prolonged bed rest during this hospitalization, safe transfers/ambulation and discharge planning recommendations/options.     Patient left up in chair with all lines intact, call button in reach, and RN notified..    GOALS:   Multidisciplinary Problems       Physical Therapy Goals          Problem: Physical Therapy    Goal Priority Disciplines Outcome Goal Variances Interventions   Physical Therapy Goal     PT, PT/OT Ongoing, Progressing     Description: 1. Supine to sit with Stand-by Assistance  2. Sit to stand transfer with Stand-by Assistance  3.. Bed to chair transfer with Supervision using Javy Walker  4. Gait  x 50 feet with Minimal Assistance using Javy Walker.                          Time Tracking:     PT Received On: 02/23/24  PT Start Time: 1144     PT Stop Time: 1223  PT Total  Time (min): 39 min     Billable Minutes: Gait Training 10 and Therapeutic Activity 29    Treatment Type: Treatment  PT/PTA: PT           02/23/2024

## 2024-02-23 NOTE — PROGRESS NOTES
[facility] Medicine  Progress Note    Patient Name: Paola Ibanez  MRN: [id number]  Patient Class: IP- Inpatient   Admission Date: 2/20/2024  Length of Stay: 3 days  Attending Physician: Nabor Brito MD  Primary Care Provider: May Rangel MD        Subjective:     Principal Problem:Atrial fibrillation with RVR        HPI:  This is a 63 y/o  female w/ pmh of a-fib, HTN who presents w/ right arm pain. Patient reports she was in her kitchen today and her floors are uneven and she stumbled and hit her arm on the cabinet; reports having had 9/10 pain but currently reports she's comfortable; denies headache, blurry vision, chest pain, SOB, palpitations, abdominal pain, N/V, dysuria and back pain. Patient is afebrile, tachycardic, hypertensive and saturating ok. Lab work reflects hyponatremia w/ Na of 131. Right humerus x-ray showed a pathologic fracture through the surgical neck of the humerus with cystic component in the proximal humeral diaphysis. Recommend supplement evaluation with CT scan of the upper extremity once the initial traumatic episode has resolved. A right shoulder x-ray showed a pathologic fracture through the surgical neck of humerus with involvement of greater tubercle. Patient received Lopressor 25 mg PO x1 in the ER and her a-fib w/ RVR has converted to sinus rhythm. Patient reports she was on Eliquis before but it caused her to have bloody BM and she hasn't been on it in years.     Overview/Hospital Course:  Patient with history of afib and hypertension presented with right arm pain found to right surgical neck fracture of the humerus.  Patient was initially atrial fibrillation with RVR, received p.o. Lopressor then converted to sinus rhythm.  Initial concern that fracture was pathological, MRI done did not report pathological.  Oncology initially consulted, recommended bone scan that can be done outpatient.  Orthopedic surgery consulted.   Recommended patient follow up outpatient with planned surgery next Tuesday.  Initially patient was discharged, on discharge patient reported being weak and not able to walk properly.  Discharge was discontinued, patient admitted.  PT consulted, recommended inpatient rehab.  Bone scan done showed no evidence of osseous metastatic disease.  Orthopedic surgery plans on surgery Monday 02/26/2024.  We will transferred to [facility] for surgery.    Interval History:  Patient seen and examined.  Overnight notes reviewed.  Patient reports pain well controlled p.r.n. pain medications.  Anticipate transfer to [facility] for surgery with Dr. Gonzalez tentatively planned for Monday 2/26.    Review of Systems   Respiratory:  Negative for shortness of breath.    Cardiovascular:  Negative for chest pain.   Gastrointestinal:  Negative for abdominal pain.   Musculoskeletal:  Positive for arthralgias.     Objective:     Vital Signs (Most Recent):  Temp: 98.8 °F (37.1 °C) (02/23/24 1202)  Pulse: 86 (02/23/24 1202)  Resp: 18 (02/23/24 1202)  BP: 114/77 (02/23/24 1202)  SpO2: 95 % (02/23/24 1202) Vital Signs (24h Range):  Temp:  [97.2 °F (36.2 °C)-99.3 °F (37.4 °C)] 98.8 °F (37.1 °C)  Pulse:  [] 86  Resp:  [17-20] 18  SpO2:  [92 %-98 %] 95 %  BP: ()/(53-77) 114/77     Weight: 102.4 kg (225 lb 12 oz)  Body mass index is 42.66 kg/m².    Intake/Output Summary (Last 24 hours) at 2/23/2024 1458  Last data filed at 2/23/2024 1400  Gross per 24 hour   Intake 1050 ml   Output 400 ml   Net 650 ml         Physical Exam  Vitals and nursing note reviewed.   Constitutional:       General: She is not in acute distress.     Appearance: Normal appearance. She is obese.   HENT:      Head: Normocephalic and atraumatic.      Mouth/Throat:      Mouth: Mucous membranes are moist.      Pharynx: Oropharynx is clear.   Eyes:      Extraocular Movements: Extraocular movements intact.      Pupils: Pupils are equal, round, and reactive to light.    Cardiovascular:      Rate and Rhythm: Normal rate. Rhythm irregular.      Pulses: Normal pulses.      Heart sounds: Normal heart sounds.   Pulmonary:      Effort: Pulmonary effort is normal.      Breath sounds: Normal breath sounds.   Abdominal:      General: Abdomen is flat. Bowel sounds are normal.      Palpations: Abdomen is soft.      Tenderness: There is no abdominal tenderness. There is no guarding or rebound.   Musculoskeletal:      Comments: Right arm in sling.  Neurovascularly intact   Skin:     General: Skin is warm.      Capillary Refill: Capillary refill takes less than 2 seconds.   Neurological:      General: No focal deficit present.      Mental Status: She is alert and oriented to person, place, and time. Mental status is at baseline.   Psychiatric:         Mood and Affect: Mood normal.         Behavior: Behavior normal.             Significant Labs: All pertinent labs within the past 24 hours have been reviewed.  CBC:   Recent Labs   Lab 02/22/24  0555 02/23/24  0553   WBC 6.90 5.93   HGB 10.9* 10.6*   HCT 34.3* 33.1*    176     CMP:   Recent Labs   Lab 02/22/24  0555 02/23/24  0553   * 135*   K 4.1 3.8    106   CO2 25 27    106   BUN 13 14   CREATININE 0.6 0.6   CALCIUM 10.4 9.9   PROT 6.8 6.1   ALBUMIN 3.4* 3.0*   BILITOT 0.8 0.6   ALKPHOS 73 64   AST 13 12   ALT 10 9*   ANIONGAP 3* 2*       Significant Imaging: I have reviewed all pertinent imaging results/findings within the past 24 hours.    Assessment/Plan:      * Atrial fibrillation with RVR  -Patient received Lopressor 25 mg PO x1 in the ER and her a-fib w/ RVR has converted to sinus rhythm  Cont lopressor   Not on AC given hx of GI bleeds   -Telemetry monitoring  2/23:  Patient AFib rate control with heart rate ranging 80s to 90s; continue Lopressor      Hyponatremia  -IVF  -Monitor labs  Resolving       Closed fracture of surgical neck of humerus  -Orthopedic Surgery consult, initially recommended sling and op  follow up for surgery next week but patient unable to be discharged given physical debility. PT recommends inpatient rehab.  Discussed with surgery, plan for surgery on Monday.  PT/OT  -Analgesic prn  -Monitor vitals  Bone scan showed no osseous metastatic disease  Patient updated on plan for transfer to [facility] for orthopedic surgery planned for Monday      Hypertension  -Home meds and adjust as necessary  -Prn antihypertensive  -Vital sign checks      VTE Risk Mitigation (From admission, onward)           Ordered     Place sequential compression device  Until discontinued         02/20/24 2210     Reason for No Pharmacological VTE Prophylaxis  Once        Comments: NPO for possible surgical intervention   Question:  Reasons:  Answer:  Physician Provided (leave comment)    02/20/24 2209     IP VTE HIGH RISK PATIENT  Once         02/20/24 2209     Place sequential compression device  Until discontinued         02/20/24 2209                    Discharge Planning   EH: 2/26/2024     Code Status: Full Code   Is the patient medically ready for discharge?:     Reason for patient still in hospital (select all that apply): Patient trending condition, Laboratory test, Treatment, Consult recommendations, and Pending disposition  Discharge Plan A: Home   Discharge Delays: None known at this time              Merline Talbert PA-C  Department of Hospital Medicine   [facility]

## 2024-02-23 NOTE — NURSING
Attempted to call report to [facility] 3rd floor. Staff states to call back when ambulance transport gets here.

## 2024-02-23 NOTE — SUBJECTIVE & OBJECTIVE
Principal Problem:Atrial fibrillation with RVR    Principal Orthopedic Problem: R proximal humerus Fx    Interval History: see note    Review of patient's allergies indicates:   Allergen Reactions    Aspirin Nausea Only     Stomach Pain  Stomach Pain      Lisinopril Other (See Comments)     Cough  Cough      Penicillin g potassium Rash     Rash  Rash         Current Facility-Administered Medications   Medication    acetaminophen tablet 650 mg    acetaminophen tablet 650 mg    aluminum-magnesium hydroxide-simethicone 200-200-20 mg/5 mL suspension 30 mL    dextrose 50% injection 12.5 g    dextrose 50% injection 25 g    glucagon (human recombinant) injection 1 mg    glucose chewable tablet 16 g    glucose chewable tablet 24 g    hydrALAZINE tablet 25 mg    hydroCHLOROthiazide tablet 25 mg    HYDROcodone-acetaminophen 5-325 mg per tablet 1 tablet    lactated ringers infusion    losartan tablet 100 mg    magnesium oxide tablet 800 mg    magnesium oxide tablet 800 mg    melatonin tablet 6 mg    metoprolol tartrate (LOPRESSOR) tablet 50 mg    morphine injection 1 mg    naloxone 0.4 mg/mL injection 0.02 mg    ondansetron injection 4 mg    potassium bicarbonate disintegrating tablet 35 mEq    potassium bicarbonate disintegrating tablet 50 mEq    potassium bicarbonate disintegrating tablet 60 mEq    potassium, sodium phosphates 280-160-250 mg packet 2 packet    potassium, sodium phosphates 280-160-250 mg packet 2 packet    potassium, sodium phosphates 280-160-250 mg packet 2 packet    sodium chloride 0.9% flush 2 mL     Objective:     Vital Signs (Most Recent):  Temp: 97.2 °F (36.2 °C) (02/23/24 0300)  Pulse: 80 (02/23/24 0300)  Resp: 18 (02/23/24 0300)  BP: 106/64 (02/23/24 0300)  SpO2: (!) 92 % (02/23/24 0300) Vital Signs (24h Range):  Temp:  [97.2 °F (36.2 °C)-99.3 °F (37.4 °C)] 97.2 °F (36.2 °C)  Pulse:  [] 80  Resp:  [3-27] 18  SpO2:  [92 %-99 %] 92 %  BP: ()/(53-92) 106/64     Weight: 102.4 kg (225 lb 12  "oz)  Height: 5' 1" (154.9 cm)  Body mass index is 42.66 kg/m².      Intake/Output Summary (Last 24 hours) at 2/23/2024 0819  Last data filed at 2/23/2024 0652  Gross per 24 hour   Intake 360 ml   Output 400 ml   Net -40 ml        General    Nursing note and vitals reviewed.  Constitutional: She is oriented to person, place, and time.   Obese   Pulmonary/Chest: Effort normal.   Neurological: She is alert and oriented to person, place, and time.         Right Shoulder Exam     Comments:  RUE in a sling. RUE DNVI.       Significant Labs: CBC:   Recent Labs   Lab 02/22/24  0555 02/23/24  0553   WBC 6.90 5.93   HGB 10.9* 10.6*   HCT 34.3* 33.1*    176       CMP:   Recent Labs   Lab 02/22/24  0555 02/23/24  0553   * 135*   K 4.1 3.8    106   CO2 25 27    106   BUN 13 14   CREATININE 0.6 0.6   CALCIUM 10.4 9.9   PROT 6.8 6.1   ALBUMIN 3.4* 3.0*   BILITOT 0.8 0.6   ALKPHOS 73 64   AST 13 12   ALT 10 9*   ANIONGAP 3* 2*       All pertinent labs within the past 24 hours have been reviewed.    Significant Imaging: X-Ray: I have reviewed all pertinent results/findings and my personal findings are:  X-ray metastatic survey:  No convincing radiographic evidence of osseous metastasis.     Bone scan or PET/CT would provide a more sensitive assessment if there strong clinical concern.     Right shoulder was not imaged due to patient factors/known fracture.    Bone scan: no metastatic disease    Right shld MRI:  Acute comminuted fracture involving the surgical neck of the right humerus. Mild medial and anterior displacement of the minimally angulated and impacted fracture fragments. Fracture extends through the greater tuberosity, with intact rotator cuff tendons inserting upon the greater tuberosity fracture fragment. The inferior glenohumeral ligament appears intact. Glenohumeral joint effusion. No fracture extension through the humeral head articular surface. Heterogeneous signal intensity at the fracture " site consistent with hemorrhage/hematoma.     Marked subcutaneous edema of the shoulder, as well as diffuse intramuscular edema within the deltoid, likely representing contusion. There is also intramuscular edema within the teres minor muscle.     Moderate AC joint osteoarthrosis. Small foci of subchondral edema within the glenoid, likely secondary to glenohumeral osteoarthrosis.

## 2024-02-23 NOTE — PLAN OF CARE
Problem: Occupational Therapy  Goal: Occupational Therapy Goal  Description: Goals to be met by: 3/23/2024     Patient will increase functional independence with ADLs by performing:    Feeding with Modified [place].  UE Dressing with Modified [place].  LE Dressing with Modified [place] and Assistive Devices as needed.  Grooming while standing at sink with Modified [place].  Toileting from bedside commode with Modified [place] for hygiene and clothing management.   Bathing from  shower chair/bench with supervision.  Toilet transfer to bedside commode with Modified [place].    Outcome: Ongoing, Progressing      Adequate: hears normal conversation without difficulty

## 2024-02-24 PROBLEM — E87.1 HYPONATREMIA: Status: RESOLVED | Noted: 2024-02-20 | Resolved: 2024-02-24

## 2024-02-24 PROBLEM — M17.12 PRIMARY OSTEOARTHRITIS OF LEFT KNEE: Status: ACTIVE | Noted: 2022-07-06

## 2024-02-24 PROBLEM — E66.01 MORBID OBESITY WITH BODY MASS INDEX (BMI) OF 40.0 OR HIGHER: Status: ACTIVE | Noted: 2021-04-26

## 2024-02-24 PROBLEM — M47.894 THORACIC FACET SYNDROME: Status: ACTIVE | Noted: 2022-04-06

## 2024-02-24 PROBLEM — D53.9 MACROCYTIC ANEMIA: Status: ACTIVE | Noted: 2024-02-24

## 2024-02-24 PROBLEM — M48.062 SPINAL STENOSIS OF LUMBAR REGION WITH NEUROGENIC CLAUDICATION: Status: ACTIVE | Noted: 2023-09-13

## 2024-02-24 PROBLEM — M47.812 CERVICAL SPONDYLOSIS WITHOUT MYELOPATHY: Status: ACTIVE | Noted: 2022-04-06

## 2024-02-24 PROBLEM — M51.16 LUMBAR DISC DISEASE WITH RADICULOPATHY: Status: ACTIVE | Noted: 2023-09-13

## 2024-02-24 PROBLEM — M47.817 LUMBOSACRAL SPONDYLOSIS WITHOUT MYELOPATHY: Status: ACTIVE | Noted: 2022-04-06

## 2024-02-24 PROBLEM — M47.816 LUMBAR FACET JOINT SYNDROME: Status: ACTIVE | Noted: 2024-02-24

## 2024-02-24 PROBLEM — M48.02 CERVICAL STENOSIS OF SPINAL CANAL: Status: ACTIVE | Noted: 2023-09-13

## 2024-02-24 LAB
ALBUMIN SERPL BCP-MCNC: 2.5 G/DL (ref 3.5–5.2)
ALBUMIN SERPL ELPH-MCNC: 3.2 G/DL (ref 2.9–4.4)
ALBUMIN/GLOB SERPL: 0.9 {RATIO} (ref 0.7–1.7)
ALP SERPL-CCNC: 71 U/L (ref 55–135)
ALPHA1 GLOB SERPL ELPH-MCNC: 0.3 G/DL (ref 0–0.4)
ALPHA2 GLOB SERPL ELPH-MCNC: 0.8 G/DL (ref 0.4–1)
ALT SERPL W/O P-5'-P-CCNC: 11 U/L (ref 10–44)
ANION GAP SERPL CALC-SCNC: 6 MMOL/L (ref 8–16)
AST SERPL-CCNC: 12 U/L (ref 10–40)
B-GLOBULIN SERPL ELPH-MCNC: 0.8 G/DL (ref 0.7–1.3)
B2 MICROGLOB SERPL-MCNC: 1.4 MG/L (ref 0.6–2.4)
BASOPHILS # BLD AUTO: 0.02 K/UL (ref 0–0.2)
BASOPHILS NFR BLD: 0.4 % (ref 0–1.9)
BILIRUB SERPL-MCNC: 0.6 MG/DL (ref 0.1–1)
BUN SERPL-MCNC: 13 MG/DL (ref 8–23)
CALCIUM SERPL-MCNC: 9.7 MG/DL (ref 8.7–10.5)
CHLORIDE SERPL-SCNC: 106 MMOL/L (ref 95–110)
CO2 SERPL-SCNC: 24 MMOL/L (ref 23–29)
CREAT SERPL-MCNC: 0.7 MG/DL (ref 0.5–1.4)
DIFFERENTIAL METHOD BLD: ABNORMAL
EOSINOPHIL # BLD AUTO: 0.1 K/UL (ref 0–0.5)
EOSINOPHIL NFR BLD: 1.6 % (ref 0–8)
ERYTHROCYTE [DISTWIDTH] IN BLOOD BY AUTOMATED COUNT: 12.5 % (ref 11.5–14.5)
EST. GFR  (NO RACE VARIABLE): >60 ML/MIN/1.73 M^2
GAMMA GLOB SERPL ELPH-MCNC: 1.8 G/DL (ref 0.4–1.8)
GLOBULIN SER CALC-MCNC: 3.7 G/DL (ref 2.2–3.9)
GLUCOSE SERPL-MCNC: 103 MG/DL (ref 70–110)
HCT VFR BLD AUTO: 34 % (ref 37–48.5)
HGB BLD-MCNC: 10.7 G/DL (ref 12–16)
IMM GRANULOCYTES # BLD AUTO: 0.03 K/UL (ref 0–0.04)
IMM GRANULOCYTES NFR BLD AUTO: 0.5 % (ref 0–0.5)
LABORATORY COMMENT REPORT: ABNORMAL
LYMPHOCYTES # BLD AUTO: 2 K/UL (ref 1–4.8)
LYMPHOCYTES NFR BLD: 35.9 % (ref 18–48)
M PROTEIN SERPL ELPH-MCNC: 1.4 G/DL
MAGNESIUM SERPL-MCNC: 1.9 MG/DL (ref 1.6–2.6)
MCH RBC QN AUTO: 31.1 PG (ref 27–31)
MCHC RBC AUTO-ENTMCNC: 31.5 G/DL (ref 32–36)
MCV RBC AUTO: 99 FL (ref 82–98)
MONOCYTES # BLD AUTO: 0.6 K/UL (ref 0.3–1)
MONOCYTES NFR BLD: 11 % (ref 4–15)
NEUTROPHILS # BLD AUTO: 2.9 K/UL (ref 1.8–7.7)
NEUTROPHILS NFR BLD: 50.6 % (ref 38–73)
NRBC BLD-RTO: 0 /100 WBC
PLATELET # BLD AUTO: 180 K/UL (ref 150–450)
PMV BLD AUTO: 10.8 FL (ref 9.2–12.9)
POTASSIUM SERPL-SCNC: 3.7 MMOL/L (ref 3.5–5.1)
PROT SERPL-MCNC: 5.8 G/DL (ref 6–8.4)
PROT SERPL-MCNC: 6.9 G/DL (ref 6–8.5)
RBC # BLD AUTO: 3.44 M/UL (ref 4–5.4)
SODIUM SERPL-SCNC: 136 MMOL/L (ref 136–145)
WBC # BLD AUTO: 5.63 K/UL (ref 3.9–12.7)

## 2024-02-24 PROCEDURE — 97535 SELF CARE MNGMENT TRAINING: CPT

## 2024-02-24 PROCEDURE — 85025 COMPLETE CBC W/AUTO DIFF WBC: CPT | Performed by: INTERNAL MEDICINE

## 2024-02-24 PROCEDURE — 25000003 PHARM REV CODE 250: Performed by: INTERNAL MEDICINE

## 2024-02-24 PROCEDURE — 83735 ASSAY OF MAGNESIUM: CPT | Performed by: INTERNAL MEDICINE

## 2024-02-24 PROCEDURE — 97116 GAIT TRAINING THERAPY: CPT | Mod: CQ

## 2024-02-24 PROCEDURE — 25000003 PHARM REV CODE 250: Performed by: PHYSICIAN ASSISTANT

## 2024-02-24 PROCEDURE — 36415 COLL VENOUS BLD VENIPUNCTURE: CPT | Performed by: INTERNAL MEDICINE

## 2024-02-24 PROCEDURE — 63600175 PHARM REV CODE 636 W HCPCS: Performed by: STUDENT IN AN ORGANIZED HEALTH CARE EDUCATION/TRAINING PROGRAM

## 2024-02-24 PROCEDURE — 97530 THERAPEUTIC ACTIVITIES: CPT | Mod: CQ

## 2024-02-24 PROCEDURE — 11000001 HC ACUTE MED/SURG PRIVATE ROOM

## 2024-02-24 PROCEDURE — 80053 COMPREHEN METABOLIC PANEL: CPT | Performed by: INTERNAL MEDICINE

## 2024-02-24 PROCEDURE — 25000003 PHARM REV CODE 250

## 2024-02-24 PROCEDURE — 94761 N-INVAS EAR/PLS OXIMETRY MLT: CPT

## 2024-02-24 RX ORDER — HYDROCODONE BITARTRATE AND ACETAMINOPHEN 5; 325 MG/1; MG/1
1 TABLET ORAL EVERY 4 HOURS PRN
Status: DISCONTINUED | OUTPATIENT
Start: 2024-02-24 | End: 2024-02-28 | Stop reason: HOSPADM

## 2024-02-24 RX ORDER — ENOXAPARIN SODIUM 100 MG/ML
40 INJECTION SUBCUTANEOUS EVERY 12 HOURS
Status: DISCONTINUED | OUTPATIENT
Start: 2024-02-24 | End: 2024-02-28 | Stop reason: HOSPADM

## 2024-02-24 RX ADMIN — SENNOSIDES AND DOCUSATE SODIUM 1 TABLET: 8.6; 5 TABLET ORAL at 10:02

## 2024-02-24 RX ADMIN — HYDRALAZINE HYDROCHLORIDE 25 MG: 25 TABLET ORAL at 02:02

## 2024-02-24 RX ADMIN — HYDROCODONE BITARTRATE AND ACETAMINOPHEN 1 TABLET: 5; 325 TABLET ORAL at 02:02

## 2024-02-24 RX ADMIN — SODIUM CHLORIDE, POTASSIUM CHLORIDE, SODIUM LACTATE AND CALCIUM CHLORIDE: 600; 310; 30; 20 INJECTION, SOLUTION INTRAVENOUS at 06:02

## 2024-02-24 RX ADMIN — HYDROCODONE BITARTRATE AND ACETAMINOPHEN 1 TABLET: 5; 325 TABLET ORAL at 10:02

## 2024-02-24 RX ADMIN — LOSARTAN POTASSIUM 100 MG: 50 TABLET, FILM COATED ORAL at 10:02

## 2024-02-24 RX ADMIN — METOPROLOL TARTRATE 50 MG: 50 TABLET, FILM COATED ORAL at 10:02

## 2024-02-24 RX ADMIN — ENOXAPARIN SODIUM 40 MG: 40 INJECTION SUBCUTANEOUS at 09:02

## 2024-02-24 RX ADMIN — HYDROCODONE BITARTRATE AND ACETAMINOPHEN 1 TABLET: 5; 325 TABLET ORAL at 09:02

## 2024-02-24 RX ADMIN — METOPROLOL TARTRATE 50 MG: 50 TABLET, FILM COATED ORAL at 09:02

## 2024-02-24 RX ADMIN — ENOXAPARIN SODIUM 40 MG: 40 INJECTION SUBCUTANEOUS at 10:02

## 2024-02-24 RX ADMIN — HYDRALAZINE HYDROCHLORIDE 25 MG: 25 TABLET ORAL at 06:02

## 2024-02-24 RX ADMIN — HYDROCODONE BITARTRATE AND ACETAMINOPHEN 1 TABLET: 5; 325 TABLET ORAL at 06:02

## 2024-02-24 RX ADMIN — HYDROCHLOROTHIAZIDE 25 MG: 25 TABLET ORAL at 06:02

## 2024-02-24 RX ADMIN — POTASSIUM BICARBONATE 50 MEQ: 977.5 TABLET, EFFERVESCENT ORAL at 06:02

## 2024-02-24 NOTE — PT/OT/SLP PROGRESS
"Physical Therapy Treatment    Patient Name:  Paola Ibanez   MRN:  [id number]    Recommendations:     Discharge Recommendations: High Intensity Therapy  Discharge Equipment Recommendations: walker, jerad, other (see comments) (R AFO)  Barriers to discharge: Decreased caregiver support and RUE NWB and immobilized pre-surgery; decreased mobility from baseline    Assessment:     Paola Ibanez is a 64 y.o. female admitted with a medical diagnosis of Atrial fibrillation with RVR.  She presents with the following impairments/functional limitations: weakness, impaired endurance, impaired functional mobility, gait instability, decreased upper extremity function, decreased lower extremity function, pain, decreased ROM, orthopedic precautions .    Pt displays good safety awareness and adherence with RUE precautions during bed mobility and transfers. Ambulated 12' with L-side hemiwalker, Amy, and VC.     Tele room called nurse due to HR in 140s during gait.     Highly motivated. Will require continued gait training and may benefit from R AFO to decrease fall risk (foot drop).     Remained up in chair with good participation and tolerance.     Rehab Prognosis: Good; patient would benefit from acute skilled PT services to address these deficits and reach maximum level of function.    Recent Surgery: Procedure(s) (LRB):  INSERTION, INTRAMEDULLARY ROBIN (Right)      Plan:     During this hospitalization, patient to be seen daily to address the identified rehab impairments via gait training, therapeutic activities, therapeutic exercises and progress toward the following goals:    Plan of Care Expires:  03/22/24    Subjective     Chief Complaint: pain R shoulder, and some mild pain R lower back  Patient/Family Comments/goals: "I need to figure out what I'm going to do when I leave here. My son can't keep missing work"  Pain/Comfort:  Pain Rating 1: 5/10  Location - Side 1: Right  Location - Orientation 1: generalized  Location 1: " shoulder  Pain Addressed 1: Pre-medicate for activity, Reposition, Distraction (Pt had received pain meds immediately prior to therapist entry)  Pain Rating Post-Intervention 1: 5/10      Objective:     Communicated with nurse prior to session.  Patient found HOB elevated with telemetry, peripheral IV, PureWick, bed alarm upon PT entry to room.     General Precautions: Standard, fall  Orthopedic Precautions: RUE non weight bearing  Braces: UE Sling  Respiratory Status: Room air     Functional Mobility:  Bed Mobility:     Supine to Sit: modA and toward L side  Seated lateral scooting: modA but good technique  Transfers:     Sit to Stand:  minimum assistance with hemiwalker  Bed to Chair: minimum assistance with  hemiwalker  using  Step Transfer  Gait: 12', Amy, L hemiwalker. Requires excess wt shift to L for advancement (circumduction) of RLE due to decreased DF. Slow pace, wide DEONTE       AM-PAC 6 CLICK MOBILITY          Treatment & Education:    -Pt educ: reinforcement of LUE NWB and immobilization with sling until post-op; benefits of OOB to chair during day and frequent mobility to maintain LE strength and endurance; call light, fall prevention   -Bed mobility and transfer training  -Gait training with HW  -Supine AROM: AP (decreased DF ROM LLE) and heel slides  -Seated EOB AROM: LAQs w/ ability to fully extend knees bilaterally     Patient left up in chair with all lines intact, call button in reach, chair alarm on, nurse notified, and family members present..    GOALS:   Multidisciplinary Problems       Physical Therapy Goals          Problem: Physical Therapy    Goal Priority Disciplines Outcome Goal Variances Interventions   Physical Therapy Goal     PT, PT/OT Ongoing, Progressing     Description: 1. Supine to sit with Stand-by Assistance  2. Sit to stand transfer with Stand-by Assistance  3.. Bed to chair transfer with Supervision using Javy Walker  4. Gait  x 50 feet with Minimal Assistance using Javy  Walker.                          Time Tracking:     PT Received On: 02/24/24  PT Start Time: 1004     PT Stop Time: 1035  PT Total Time (min): 31 min     Billable Minutes: Gait Training 15 and Therapeutic Activity 16    Treatment Type: Treatment  PT/PTA: PTA     Number of PTA visits since last PT visit: 1 02/24/2024

## 2024-02-24 NOTE — NURSING
Gave report to nurse at [facility] 3rd floor, transport here to bring her to room 310 at [facility]

## 2024-02-24 NOTE — ASSESSMENT & PLAN NOTE
Body mass index is 46.49 kg/m². Morbid obesity complicates all aspects of disease management from diagnostic modalities to treatment.

## 2024-02-24 NOTE — CARE UPDATE
02/24/24 0804   Patient Assessment/Suction   Level of Consciousness (AVPU) alert   Respiratory Effort Unlabored   Expansion/Accessory Muscles/Retractions no use of accessory muscles;no retractions;expansion symmetric   Rhythm/Pattern, Respiratory unlabored;pattern regular;depth regular;no shortness of breath reported   PRE-TX-O2   Device (Oxygen Therapy) room air   SpO2 97 %   Pulse Oximetry Type Intermittent   $ Pulse Oximetry - Multiple Charge Pulse Oximetry - Multiple   Pulse 90   Resp 16

## 2024-02-24 NOTE — NURSING
OOB to chair with assistance of PT tolerated well Quad cane used with gait belt to increase balance

## 2024-02-24 NOTE — CARE UPDATE
Pt received from [facility].  She is awake, alert and oriented x 3.  Denies needs at this time.  VSS.  Sling to RUE in place.    Physical Exam:  General- Patient alert and oriented x3 in NAD  HEENT- PERRLA, EOMI, OP clear, MMM  Neck- No JVD, supple  CV- Regular rate and rhythm  Resp- Lungs CTA Bilaterally, No increased WOB  GI- Non tender/non-distended, BS normoactive x4 quads, no HSM  Extrem- No cyanosis, clubbing, edema. Pulses 2+ and symmetric  Skin-  No masses, rashes or lesions noted on cursory skin exam.

## 2024-02-24 NOTE — NURSING
Nurses Note -- 4 Eyes      2/23/2024   10:48 PM      Skin assessed during: Transfer      [x] No Altered Skin Integrity Present    [x]Prevention Measures Documented      [] Yes- Altered Skin Integrity Present or Discovered   [] LDA Added if Not in Epic (Describe Wound)   [] New Altered Skin Integrity was Present on Admit and Documented in LDA   [] Wound Image Taken    Wound Care Consulted? No    Attending Nurse:  Clemencia RUBIN LPN    Second RN/Staff Member:   Feliciano CRUZ RN

## 2024-02-24 NOTE — PLAN OF CARE
POC reviewed VSS afebrile pain managed with PRN medications appetite good Tele monitored no acute distress noted this shift tolerated PT/OT without complications ambulated with Quad cane Sling remains in place to Right upper extremity scheduled for am surgery on Monday 02/26/24 instructed to call for any assistance

## 2024-02-24 NOTE — ASSESSMENT & PLAN NOTE
-Orthopedic Surgery to take to OR 2/26  -NPO midnight 2/26  -PRN analgesics  -PT/OT  -RUE in sling

## 2024-02-24 NOTE — PROGRESS NOTES
[facility]  Progress Note    Patient Name: Paola Ibanez  MRN: [id number]  Admission Date: 2/20/2024  Hospital Length of Stay: 4 days  Attending Provider: Roberto Leigh MD  Primary Care Provider: May Rangel MD    Subjective:     Principal Problem:Atrial fibrillation with RVR    Principal Orthopedic Problem: L prox humerus Fx    Interval History: none    Review of patient's allergies indicates:   Allergen Reactions    Aspirin Nausea Only     Stomach Pain  Stomach Pain      Lisinopril Other (See Comments)     Cough  Cough      Penicillin g potassium Rash     Rash  Rash         Current Facility-Administered Medications   Medication    acetaminophen tablet 650 mg    acetaminophen tablet 650 mg    aluminum-magnesium hydroxide-simethicone 200-200-20 mg/5 mL suspension 30 mL    dextrose 10% bolus 125 mL 125 mL    dextrose 10% bolus 250 mL 250 mL    glucagon (human recombinant) injection 1 mg    glucose chewable tablet 16 g    glucose chewable tablet 24 g    hydrALAZINE tablet 25 mg    hydroCHLOROthiazide tablet 25 mg    HYDROcodone-acetaminophen 5-325 mg per tablet 1 tablet    lactated ringers infusion    losartan tablet 100 mg    magnesium oxide tablet 800 mg    magnesium oxide tablet 800 mg    melatonin tablet 6 mg    metoprolol tartrate (LOPRESSOR) tablet 50 mg    morphine injection 1 mg    naloxone 0.4 mg/mL injection 0.02 mg    ondansetron injection 4 mg    polyethylene glycol packet 17 g    potassium bicarbonate disintegrating tablet 35 mEq    potassium bicarbonate disintegrating tablet 50 mEq    potassium bicarbonate disintegrating tablet 60 mEq    potassium, sodium phosphates 280-160-250 mg packet 2 packet    potassium, sodium phosphates 280-160-250 mg packet 2 packet    potassium, sodium phosphates 280-160-250 mg packet 2 packet    senna-docusate 8.6-50 mg per tablet 1 tablet    sodium chloride 0.9% flush 2 mL     Objective:     Vital Signs (Most Recent):  Temp:  "98 °F (36.7 °C) (02/24/24 0830)  Pulse: 97 (02/24/24 0830)  Resp: 18 (02/24/24 0830)  BP: 116/69 (02/24/24 0830)  SpO2: 97 % (02/24/24 0830) Vital Signs (24h Range):  Temp:  [97.4 °F (36.3 °C)-99.7 °F (37.6 °C)] 98 °F (36.7 °C)  Pulse:  [82-97] 97  Resp:  [17-20] 18  SpO2:  [94 %-98 %] 97 %  BP: (114-126)/(59-84) 116/69     Weight: 111.6 kg (246 lb 0.5 oz)  Height: 5' 1" (154.9 cm)  Body mass index is 46.49 kg/m².      Intake/Output Summary (Last 24 hours) at 2/24/2024 0855  Last data filed at 2/24/2024 0641  Gross per 24 hour   Intake 1848 ml   Output 300 ml   Net 1548 ml        General    Nursing note and vitals reviewed.  Constitutional: She is oriented to person, place, and time.   Very pleasant, obese   Pulmonary/Chest: Effort normal.   Neurological: She is alert and oriented to person, place, and time.   Psychiatric: She has a normal mood and affect. Her behavior is normal.         Right Shoulder Exam     Comments:  Sling remains in place. CRISTIANAE DNVI.       Significant Labs: CBC:   Recent Labs   Lab 02/23/24  0553 02/24/24  0337   WBC 5.93 5.63   HGB 10.6* 10.7*   HCT 33.1* 34.0*    180     CMP:   Recent Labs   Lab 02/23/24  0553 02/24/24  0337   * 136   K 3.8 3.7    106   CO2 27 24    103   BUN 14 13   CREATININE 0.6 0.7   CALCIUM 9.9 9.7   PROT 6.1 5.8*   ALBUMIN 3.0* 2.5*   BILITOT 0.6 0.6   ALKPHOS 64 71   AST 12 12   ALT 9* 11   ANIONGAP 2* 6*     All pertinent labs within the past 24 hours have been reviewed.    Significant Imaging: None  Assessment/Plan:     Hyponatremia  Humerus Fx appears pathologic. Reviewed films with Dr. Gonzalez. Radiologist agrees.    Dr. Gonzalez recommends MRI of R shld and R humerus - no contrast  Oncology referral  No surgery today - pt may eat.    Closed fracture of surgical neck of humerus  RUE in sling at all time  RUE NWB  PT for mobility and LE endurance  OT for ADLs    NPO after mn tomorrow. R humerus IM nail surgery on Monday with Dr." Finger          RAYMUNDO PECK  Orthopedics  [facility]/Surg

## 2024-02-24 NOTE — ASSESSMENT & PLAN NOTE
MADDY in sling at all time  RUE NWB  PT for mobility and LE endurance  OT for ADLs    NPO after mn tomorrow. R humerus IM nail surgery on Monday with Dr. Gonzalez

## 2024-02-24 NOTE — PLAN OF CARE
Plan of care reviewed with patient, verbalized complete understanding. Continuous cardiac monitoring in place. IV intact and patent with IVF infusing as ordered, no s/s infection or infiltration to site. Meds given per MAR. PW in place. Sling to RUE. SCD's in use. Complaints of pain and discomfort managed with PRN medication. NAD noted. Purposeful q2hr rounding done. Safety maintained with side rails up x3, bed wheels locked, bed in lowest position, bed alarm set, slip resistant socks in use, and call light with in reach of patient. Patient educated to call for assistance when needed. Patient remains free of falls. No further needs expressed at this time. Will continue to monitor.     Problem: Adult Inpatient Plan of Care  Goal: Plan of Care Review  Outcome: Ongoing, Progressing  Goal: Patient-Specific Goal (Individualized)  Outcome: Ongoing, Progressing  Goal: Absence of Hospital-Acquired Illness or Injury  Outcome: Ongoing, Progressing  Goal: Optimal Comfort and Wellbeing  Outcome: Ongoing, Progressing  Goal: Readiness for Transition of Care  Outcome: Ongoing, Progressing     Problem: Bariatric Environmental Safety  Goal: Safety Maintained with Care  Outcome: Ongoing, Progressing     Problem: Skin Injury Risk Increased  Goal: Skin Health and Integrity  Outcome: Ongoing, Progressing

## 2024-02-24 NOTE — PROGRESS NOTES
"[facility]  Progress Note    Patient Name: Paola Ibanez  MRN: [id number]  Patient Class: IP- Inpatient   Admission Date: 2/20/2024  Length of Stay: 4 days  Attending Physician: Roberto Leigh MD  Primary Care Provider: May Rangel MD        Subjective:     Principal Problem:Closed fracture of surgical neck of humerus        HPI:  This is a 63 y/o  female w/ pmh of a-fib, HTN who presents w/ right arm pain. Patient reports she was in her kitchen today and her floors are uneven and she stumbled and hit her arm on the cabinet; reports having had 9/10 pain but currently reports she's comfortable; denies headache, blurry vision, chest pain, SOB, palpitations, abdominal pain, N/V, dysuria and back pain. Patient is afebrile, tachycardic, hypertensive and saturating ok. Lab work reflects hyponatremia w/ Na of 131. Right humerus x-ray showed a pathologic fracture through the surgical neck of the humerus with cystic component in the proximal humeral diaphysis. Recommend supplement evaluation with CT scan of the upper extremity once the initial traumatic episode has resolved. A right shoulder x-ray showed a pathologic fracture through the surgical neck of humerus with involvement of greater tubercle. Patient received Lopressor 25 mg PO x1 in the ER and her a-fib w/ RVR has converted to sinus rhythm. Patient reports she was on Eliquis before but it caused her to have bloody BM and she hasn't been on it in years.     Overview/Hospital Course:  Paola Ibanez is a 64 year old female with a past medical history of obesity, HTN, anemia and Afib who presented with Afib with RVR with a R humerus fracture. The Afib is rate controlled on metoprolol. Cardiology has been consulted. Orthopedic Surgery was also consulted and plans to take patient to the OR 2/26. PT/OT has been consulted.    Interval History: see "Hospital Course"    Review of Systems "   Musculoskeletal:  Positive for arthralgias and myalgias.     Objective:     Vital Signs (Most Recent):  Temp: 98.2 °F (36.8 °C) (02/24/24 1137)  Pulse: 99 (02/24/24 1447)  Resp: 17 (02/24/24 1450)  BP: 122/68 (02/24/24 1447)  SpO2: 95 % (02/24/24 1137) Vital Signs (24h Range):  Temp:  [97.4 °F (36.3 °C)-99.7 °F (37.6 °C)] 98.2 °F (36.8 °C)  Pulse:  [82-99] 99  Resp:  [16-20] 17  SpO2:  [95 %-98 %] 95 %  BP: (109-126)/(59-84) 122/68     Weight: 111.6 kg (246 lb 0.5 oz)  Body mass index is 46.49 kg/m².    Intake/Output Summary (Last 24 hours) at 2/24/2024 1526  Last data filed at 2/24/2024 0641  Gross per 24 hour   Intake 1158 ml   Output 300 ml   Net 858 ml         Physical Exam  Vitals and nursing note reviewed.   HENT:      Head: Normocephalic and atraumatic.      Right Ear: External ear normal.      Left Ear: External ear normal.      Nose: Nose normal.      Mouth/Throat:      Mouth: Mucous membranes are moist.      Pharynx: Oropharynx is clear.   Eyes:      Extraocular Movements: Extraocular movements intact.      Conjunctiva/sclera: Conjunctivae normal.   Cardiovascular:      Rate and Rhythm: Normal rate and regular rhythm.      Pulses: Normal pulses.      Heart sounds: Normal heart sounds.   Pulmonary:      Effort: Pulmonary effort is normal.      Breath sounds: Normal breath sounds.   Abdominal:      General: Bowel sounds are normal.      Palpations: Abdomen is soft.   Musculoskeletal:         General: Signs of injury (RUE in sling.) present.      Cervical back: Normal range of motion and neck supple.      Right lower leg: No edema.      Left lower leg: No edema.   Skin:     General: Skin is warm and dry.   Neurological:      Mental Status: She is alert. Mental status is at baseline.   Psychiatric:         Mood and Affect: Mood normal.         Behavior: Behavior normal.             Significant Labs: All pertinent labs within the past 24 hours have been reviewed.    Significant Imaging: I have reviewed all  pertinent imaging results/findings within the past 24 hours.    Assessment/Plan:      * Closed fracture of surgical neck of humerus  -Orthopedic Surgery to take to OR 2/26  -NPO midnight 2/26  -PRN analgesics  -PT/OT  -RUE in sling      Macrocytic anemia  Stable.  -Trend Hgb with CBC    Morbid obesity with body mass index (BMI) of 40.0 or higher  Body mass index is 46.49 kg/m². Morbid obesity complicates all aspects of disease management from diagnostic modalities to treatment.        Hypertension  -Continue home hydralazine, HCTZ, metoprolol and losartan  -Continue to monitor    Atrial fibrillation with RVR  RVR resolved.  -Metoprolol  -Hold anticoagulation  -Telemetry  -Cardiology following        VTE Risk Mitigation (From admission, onward)           Ordered     enoxaparin injection 40 mg  Every 12 hours         02/24/24 0943     Reason for No Pharmacological VTE Prophylaxis  Once        Comments: NPO for possible surgical intervention   Question:  Reasons:  Answer:  Physician Provided (leave comment)    02/20/24 2209     IP VTE HIGH RISK PATIENT  Once         02/20/24 2209     Place sequential compression device  Until discontinued         02/20/24 2209                    Discharge Planning   EH: 2/26/2024     Code Status: Full Code   Is the patient medically ready for discharge?:     Reason for patient still in hospital (select all that apply): Patient trending condition, Treatment, Consult recommendations, PT / OT recommendations, and Pending disposition  Discharge Plan A: Home   Discharge Delays: None known at this time              Roberto Leigh MD  Department of Hospital Medicine   [facility]/Surg

## 2024-02-24 NOTE — SUBJECTIVE & OBJECTIVE
Principal Problem:Atrial fibrillation with RVR    Principal Orthopedic Problem: L prox humerus Fx    Interval History: none    Review of patient's allergies indicates:   Allergen Reactions    Aspirin Nausea Only     Stomach Pain  Stomach Pain      Lisinopril Other (See Comments)     Cough  Cough      Penicillin g potassium Rash     Rash  Rash         Current Facility-Administered Medications   Medication    acetaminophen tablet 650 mg    acetaminophen tablet 650 mg    aluminum-magnesium hydroxide-simethicone 200-200-20 mg/5 mL suspension 30 mL    dextrose 10% bolus 125 mL 125 mL    dextrose 10% bolus 250 mL 250 mL    glucagon (human recombinant) injection 1 mg    glucose chewable tablet 16 g    glucose chewable tablet 24 g    hydrALAZINE tablet 25 mg    hydroCHLOROthiazide tablet 25 mg    HYDROcodone-acetaminophen 5-325 mg per tablet 1 tablet    lactated ringers infusion    losartan tablet 100 mg    magnesium oxide tablet 800 mg    magnesium oxide tablet 800 mg    melatonin tablet 6 mg    metoprolol tartrate (LOPRESSOR) tablet 50 mg    morphine injection 1 mg    naloxone 0.4 mg/mL injection 0.02 mg    ondansetron injection 4 mg    polyethylene glycol packet 17 g    potassium bicarbonate disintegrating tablet 35 mEq    potassium bicarbonate disintegrating tablet 50 mEq    potassium bicarbonate disintegrating tablet 60 mEq    potassium, sodium phosphates 280-160-250 mg packet 2 packet    potassium, sodium phosphates 280-160-250 mg packet 2 packet    potassium, sodium phosphates 280-160-250 mg packet 2 packet    senna-docusate 8.6-50 mg per tablet 1 tablet    sodium chloride 0.9% flush 2 mL     Objective:     Vital Signs (Most Recent):  Temp: 98 °F (36.7 °C) (02/24/24 0830)  Pulse: 97 (02/24/24 0830)  Resp: 18 (02/24/24 0830)  BP: 116/69 (02/24/24 0830)  SpO2: 97 % (02/24/24 0830) Vital Signs (24h Range):  Temp:  [97.4 °F (36.3 °C)-99.7 °F (37.6 °C)] 98 °F (36.7 °C)  Pulse:  [82-97] 97  Resp:  [17-20] 18  SpO2:  [94  "%-98 %] 97 %  BP: (114-126)/(59-84) 116/69     Weight: 111.6 kg (246 lb 0.5 oz)  Height: 5' 1" (154.9 cm)  Body mass index is 46.49 kg/m².      Intake/Output Summary (Last 24 hours) at 2/24/2024 0855  Last data filed at 2/24/2024 0641  Gross per 24 hour   Intake 1848 ml   Output 300 ml   Net 1548 ml        General    Nursing note and vitals reviewed.  Constitutional: She is oriented to person, place, and time.   Very pleasant, obese   Pulmonary/Chest: Effort normal.   Neurological: She is alert and oriented to person, place, and time.   Psychiatric: She has a normal mood and affect. Her behavior is normal.         Right Shoulder Exam     Comments:  Sling remains in place. KIRSTIE CEDILLO.       Significant Labs: CBC:   Recent Labs   Lab 02/23/24  0553 02/24/24  0337   WBC 5.93 5.63   HGB 10.6* 10.7*   HCT 33.1* 34.0*    180     CMP:   Recent Labs   Lab 02/23/24  0553 02/24/24  0337   * 136   K 3.8 3.7    106   CO2 27 24    103   BUN 14 13   CREATININE 0.6 0.7   CALCIUM 9.9 9.7   PROT 6.1 5.8*   ALBUMIN 3.0* 2.5*   BILITOT 0.6 0.6   ALKPHOS 64 71   AST 12 12   ALT 9* 11   ANIONGAP 2* 6*     All pertinent labs within the past 24 hours have been reviewed.    Significant Imaging: None  "

## 2024-02-24 NOTE — SUBJECTIVE & OBJECTIVE
"Interval History: see "Hospital Course"    Review of Systems   Musculoskeletal:  Positive for arthralgias and myalgias.     Objective:     Vital Signs (Most Recent):  Temp: 98.2 °F (36.8 °C) (02/24/24 1137)  Pulse: 99 (02/24/24 1447)  Resp: 17 (02/24/24 1450)  BP: 122/68 (02/24/24 1447)  SpO2: 95 % (02/24/24 1137) Vital Signs (24h Range):  Temp:  [97.4 °F (36.3 °C)-99.7 °F (37.6 °C)] 98.2 °F (36.8 °C)  Pulse:  [82-99] 99  Resp:  [16-20] 17  SpO2:  [95 %-98 %] 95 %  BP: (109-126)/(59-84) 122/68     Weight: 111.6 kg (246 lb 0.5 oz)  Body mass index is 46.49 kg/m².    Intake/Output Summary (Last 24 hours) at 2/24/2024 1526  Last data filed at 2/24/2024 0641  Gross per 24 hour   Intake 1158 ml   Output 300 ml   Net 858 ml         Physical Exam  Vitals and nursing note reviewed.   HENT:      Head: Normocephalic and atraumatic.      Right Ear: External ear normal.      Left Ear: External ear normal.      Nose: Nose normal.      Mouth/Throat:      Mouth: Mucous membranes are moist.      Pharynx: Oropharynx is clear.   Eyes:      Extraocular Movements: Extraocular movements intact.      Conjunctiva/sclera: Conjunctivae normal.   Cardiovascular:      Rate and Rhythm: Normal rate and regular rhythm.      Pulses: Normal pulses.      Heart sounds: Normal heart sounds.   Pulmonary:      Effort: Pulmonary effort is normal.      Breath sounds: Normal breath sounds.   Abdominal:      General: Bowel sounds are normal.      Palpations: Abdomen is soft.   Musculoskeletal:         General: Signs of injury (RUE in sling.) present.      Cervical back: Normal range of motion and neck supple.      Right lower leg: No edema.      Left lower leg: No edema.   Skin:     General: Skin is warm and dry.   Neurological:      Mental Status: She is alert. Mental status is at baseline.   Psychiatric:         Mood and Affect: Mood normal.         Behavior: Behavior normal.             Significant Labs: All pertinent labs within the past 24 hours have " been reviewed.    Significant Imaging: I have reviewed all pertinent imaging results/findings within the past 24 hours.

## 2024-02-24 NOTE — PT/OT/SLP PROGRESS
Occupational Therapy   Treatment    Name: Paola Ibanez  MRN: [id number]  Admitting Diagnosis:  Closed fracture of surgical neck of humerus     Recommendations:     Discharge Recommendations: High Intensity Therapy  Discharge Equipment Recommendations:  bath bench, bedside commode  Barriers to discharge:      Assessment:     Paola Ibanez is a 64 y.o. female with a medical diagnosis of Closed fracture of surgical neck of humerus.  Patient maintained R UE NWB precautions throughout session. She presents with performance deficits affecting function are weakness, impaired self care skills, impaired functional mobility, gait instability, impaired balance, decreased lower extremity function, decreased upper extremity function, decreased coordination, pain, decreased ROM, impaired joint extensibility and orthopedic precautions. Continue OT treatment to maximize safety and independence with ADLs.    Rehab Prognosis:  Good; patient would benefit from acute skilled OT services to address these deficits and reach maximum level of function.       Plan:     Patient to be seen 6 x/week to address the above listed problems via self-care/home management, therapeutic activities, therapeutic exercises  Plan of Care Expires: 03/23/24    Subjective     Chief Complaint: Right shoulder; Patient requested to use the bathroom.  Patient/Family Comments/goals: Pain relief and to improve her independence with ADLs  Pain/Comfort:  Pain Rating 1: 8/10  Location - Side 1: Right  Location 1: shoulder  Pain Addressed 1: Nurse notified    Objective:     Communicated with: nurse prior to session.  Patient found up in chair with peripheral IV upon OT entry to room.    General Precautions: Standard, fall    Orthopedic Precautions: RUE non weight bearing (surgery scheduled for 3/26/2024 for ORIF 2/2 humeral neck fracture. Keep RUE immobilized in sling, no ROM and no active movment at this time.)   Braces: UE Sling  Respiratory Status: Room air      Occupational Performance:     Bed Mobility:    Patient completed Scooting/Bridging with maximal assistance and 2 persons  Patient completed Sit to Supine with maximal assistance     Functional Mobility/Transfers:  Patient completed Sit <> Stand Transfer with minimum assistance with hemiwalker   Patient completed Toilet Transfer Step Transfer technique with minimum assistance with hemiwalker  Functional Mobility: chair > bathroom > sink > bed using a hemiwalker with CGA for assist to advance R LE, foot drop and extra time and effort.     Activities of Daily Living:  Grooming: Pt washed her left hand standing at the sink with contact guard assistance.   Lower Body Dressing: total assistance to doff/don socks  Toileting: moderate assistance for clothing management while pt supported self on HW with LUE    Treatment & Education:  Educated on the importance of OOB mobility within safe range in order to decrease adverse effects of prolonged bedrest.  Educated on performing functional mobility and ADL's in adherence to orthopedic precautions.  Educated on R UE Non weight bearing status  Therapist provided facilitation and instruction of proper body mechanics and fall prevention strategies during tasks listed above.  OT ed pt on bed mobility techniques to increase independence with task.    Patient left HOB elevated with all lines intact, call button in reach, bed alarm on and nurse notified.    GOALS:   Multidisciplinary Problems       Occupational Therapy Goals          Problem: Occupational Therapy    Goal Priority Disciplines Outcome Interventions   Occupational Therapy Goal     OT, PT/OT Ongoing, Progressing    Description: Goals to be met by: 3/23/2024     Patient will increase functional independence with ADLs by performing:    Feeding with Modified [place].  UE Dressing with Modified [place].  LE Dressing with Modified [place] and Assistive Devices as needed.  Grooming while standing at sink with  Modified [place].  Toileting from bedside commode with Modified [place] for hygiene and clothing management.   Bathing from  shower chair/bench with supervision.  Toilet transfer to bedside commode with Modified [place].                         Time Tracking:     OT Date of Treatment: 02/24/24  OT Start Time: 1413  OT Stop Time: 1506  OT Total Time (min): 53 min    Billable Minutes:Self Care/Home Management 53               2/24/2024

## 2024-02-25 LAB
ALBUMIN SERPL BCP-MCNC: 2.9 G/DL (ref 3.5–5.2)
ALP SERPL-CCNC: 73 U/L (ref 55–135)
ALT SERPL W/O P-5'-P-CCNC: 11 U/L (ref 10–44)
ANION GAP SERPL CALC-SCNC: 8 MMOL/L (ref 8–16)
AST SERPL-CCNC: 10 U/L (ref 10–40)
BASOPHILS # BLD AUTO: 0.03 K/UL (ref 0–0.2)
BASOPHILS NFR BLD: 0.4 % (ref 0–1.9)
BILIRUB SERPL-MCNC: 0.9 MG/DL (ref 0.1–1)
BUN SERPL-MCNC: 16 MG/DL (ref 8–23)
CALCIUM SERPL-MCNC: 10 MG/DL (ref 8.7–10.5)
CHLORIDE SERPL-SCNC: 104 MMOL/L (ref 95–110)
CO2 SERPL-SCNC: 24 MMOL/L (ref 23–29)
CREAT SERPL-MCNC: 0.8 MG/DL (ref 0.5–1.4)
DIFFERENTIAL METHOD BLD: ABNORMAL
EOSINOPHIL # BLD AUTO: 0.2 K/UL (ref 0–0.5)
EOSINOPHIL NFR BLD: 2.2 % (ref 0–8)
ERYTHROCYTE [DISTWIDTH] IN BLOOD BY AUTOMATED COUNT: 12.7 % (ref 11.5–14.5)
EST. GFR  (NO RACE VARIABLE): >60 ML/MIN/1.73 M^2
GLUCOSE SERPL-MCNC: 98 MG/DL (ref 70–110)
HCT VFR BLD AUTO: 33 % (ref 37–48.5)
HGB BLD-MCNC: 10.3 G/DL (ref 12–16)
IMM GRANULOCYTES # BLD AUTO: 0.05 K/UL (ref 0–0.04)
IMM GRANULOCYTES NFR BLD AUTO: 0.7 % (ref 0–0.5)
LYMPHOCYTES # BLD AUTO: 2.6 K/UL (ref 1–4.8)
LYMPHOCYTES NFR BLD: 39.3 % (ref 18–48)
MAGNESIUM SERPL-MCNC: 1.9 MG/DL (ref 1.6–2.6)
MCH RBC QN AUTO: 31.3 PG (ref 27–31)
MCHC RBC AUTO-ENTMCNC: 31.2 G/DL (ref 32–36)
MCV RBC AUTO: 100 FL (ref 82–98)
MONOCYTES # BLD AUTO: 0.5 K/UL (ref 0.3–1)
MONOCYTES NFR BLD: 8.1 % (ref 4–15)
NEUTROPHILS # BLD AUTO: 3.3 K/UL (ref 1.8–7.7)
NEUTROPHILS NFR BLD: 49.3 % (ref 38–73)
NRBC BLD-RTO: 0 /100 WBC
PLATELET # BLD AUTO: 215 K/UL (ref 150–450)
PMV BLD AUTO: 10.6 FL (ref 9.2–12.9)
POTASSIUM SERPL-SCNC: 3.9 MMOL/L (ref 3.5–5.1)
PROT SERPL-MCNC: 6.6 G/DL (ref 6–8.4)
RBC # BLD AUTO: 3.29 M/UL (ref 4–5.4)
SODIUM SERPL-SCNC: 136 MMOL/L (ref 136–145)
WBC # BLD AUTO: 6.67 K/UL (ref 3.9–12.7)

## 2024-02-25 PROCEDURE — 80053 COMPREHEN METABOLIC PANEL: CPT | Performed by: INTERNAL MEDICINE

## 2024-02-25 PROCEDURE — 25000003 PHARM REV CODE 250: Performed by: INTERNAL MEDICINE

## 2024-02-25 PROCEDURE — 97116 GAIT TRAINING THERAPY: CPT | Mod: CQ

## 2024-02-25 PROCEDURE — 63600175 PHARM REV CODE 636 W HCPCS: Performed by: STUDENT IN AN ORGANIZED HEALTH CARE EDUCATION/TRAINING PROGRAM

## 2024-02-25 PROCEDURE — 85025 COMPLETE CBC W/AUTO DIFF WBC: CPT | Performed by: INTERNAL MEDICINE

## 2024-02-25 PROCEDURE — 25000003 PHARM REV CODE 250: Performed by: PHYSICIAN ASSISTANT

## 2024-02-25 PROCEDURE — 94761 N-INVAS EAR/PLS OXIMETRY MLT: CPT

## 2024-02-25 PROCEDURE — 11000001 HC ACUTE MED/SURG PRIVATE ROOM

## 2024-02-25 PROCEDURE — 36415 COLL VENOUS BLD VENIPUNCTURE: CPT | Performed by: INTERNAL MEDICINE

## 2024-02-25 PROCEDURE — 97530 THERAPEUTIC ACTIVITIES: CPT | Mod: CQ

## 2024-02-25 PROCEDURE — 83735 ASSAY OF MAGNESIUM: CPT | Performed by: INTERNAL MEDICINE

## 2024-02-25 RX ORDER — SODIUM CHLORIDE 9 MG/ML
INJECTION, SOLUTION INTRAVENOUS CONTINUOUS
Status: ACTIVE | OUTPATIENT
Start: 2024-02-26 | End: 2024-02-26

## 2024-02-25 RX ADMIN — HYDRALAZINE HYDROCHLORIDE 25 MG: 25 TABLET ORAL at 02:02

## 2024-02-25 RX ADMIN — LOSARTAN POTASSIUM 100 MG: 50 TABLET, FILM COATED ORAL at 09:02

## 2024-02-25 RX ADMIN — ENOXAPARIN SODIUM 40 MG: 40 INJECTION SUBCUTANEOUS at 09:02

## 2024-02-25 RX ADMIN — HYDROCODONE BITARTRATE AND ACETAMINOPHEN 1 TABLET: 5; 325 TABLET ORAL at 09:02

## 2024-02-25 RX ADMIN — METOPROLOL TARTRATE 50 MG: 50 TABLET, FILM COATED ORAL at 09:02

## 2024-02-25 RX ADMIN — HYDROCODONE BITARTRATE AND ACETAMINOPHEN 1 TABLET: 5; 325 TABLET ORAL at 02:02

## 2024-02-25 NOTE — CONSULTS
Thank you for your consult to [facility]. We have reviewed the patient chart. This patient does meet criteria for [facility] service at this time.  Will assume care on 02/25/24.    Nadira Carter MD

## 2024-02-25 NOTE — CARE UPDATE
02/25/24 0705   Patient Assessment/Suction   Level of Consciousness (AVPU) alert   Respiratory Effort Unlabored   Expansion/Accessory Muscles/Retractions no use of accessory muscles;no retractions;expansion symmetric   Rhythm/Pattern, Respiratory unlabored;depth regular;pattern regular;no shortness of breath reported   PRE-TX-O2   Device (Oxygen Therapy) room air   SpO2 97 %   Pulse Oximetry Type Intermittent   $ Pulse Oximetry - Multiple Charge Pulse Oximetry - Multiple   Pulse 67   Resp 16

## 2024-02-25 NOTE — PLAN OF CARE
Plan of care reviewed with patient, verbalized complete understanding. Continuous cardiac monitoring in place. IV intact and patent, no s/s infection or infiltration to site. Meds given per MAR. PW in place. Sling to RUE. SCD's in use. Complaints of pain and discomfort managed with PRN medication. NAD noted. Purposeful q2hr rounding done. Safety maintained with side rails up x3, bed wheels locked, bed in lowest position, bed alarm set, slip resistant socks in use, and call light with in reach of patient. Patient educated to call for assistance when needed. Patient remains free of falls. No further needs expressed at this time. Will continue to monitor.      Problem: Adult Inpatient Plan of Care  Goal: Plan of Care Review  Outcome: Ongoing, Progressing  Goal: Patient-Specific Goal (Individualized)  Outcome: Ongoing, Progressing  Goal: Absence of Hospital-Acquired Illness or Injury  Outcome: Ongoing, Progressing  Goal: Optimal Comfort and Wellbeing  Outcome: Ongoing, Progressing  Goal: Readiness for Transition of Care  Outcome: Ongoing, Progressing     Problem: Bariatric Environmental Safety  Goal: Safety Maintained with Care  Outcome: Ongoing, Progressing     Problem: Skin Injury Risk Increased  Goal: Skin Health and Integrity  Outcome: Ongoing, Progressing

## 2024-02-25 NOTE — PT/OT/SLP PROGRESS
Physical Therapy Treatment    Patient Name:  Paola Ibanez   MRN:  [id number]    Recommendations:     Discharge Recommendations: High Intensity Therapy  Discharge Equipment Recommendations: walker, jerad, other (see comments) (R AFO)  Barriers to discharge:  weakness, pain, impaired endurance, impaired functional mobility, decreased UE/LE function, orthopedic precautions.     Assessment:     Paola Ibanez is a 64 y.o. female admitted with a medical diagnosis of Closed fracture of surgical neck of humerus.  She presents with the following impairments/functional limitations: weakness, impaired endurance, impaired functional mobility, gait instability, decreased upper extremity function, decreased lower extremity function, decreased ROM, orthopedic precautions.    Pt found resting with HOB elevated, agreeable to skilled physical therapy session today. Amy given to assist with transferring from supine to sitting EOB. Once EOB, pt requested a short rest break prior to transferring to standing. CGA and jerad-walker given to perform sit to stand from EOB. She ambulated 10ft from EOB to bathroom with jerad-walker and CGA demonstrating foot drop to RLE and circumduction to advance RLE during swing phase. She transferred to toilet with SBA utilizing grab bars. Sit to stand performed from toilet with CGA utilizing grab bars followed by pt performing hygiene independently. She ambulated 15ft with CGA and jerad-walker demonstrating decreased foot clearance on RLE with circumduction. She was left sitting in bed side chair with chair alarm on, call light within reach, all needs met, performing oral hygiene, and RN notified.     Rehab Prognosis: Fair; patient would benefit from acute skilled PT services to address these deficits and reach maximum level of function.    Recent Surgery: Procedure(s) (LRB):  INSERTION, INTRAMEDULLARY ROBIN (Right)      Plan:     During this hospitalization, patient to be seen daily to address the  identified rehab impairments via gait training, therapeutic activities, therapeutic exercises and progress toward the following goals:    Plan of Care Expires:  03/22/24    Subjective     Chief Complaint: pain in RUE  Patient/Family Comments/goals: to get better and go home  Pain/Comfort:  Location - Side 1: Right  Location - Orientation 1: generalized  Location 1: shoulder      Objective:     Communicated with RN prior to session.  Patient found HOB elevated with telemetry, peripheral IV, PureWick, bed alarm upon PT entry to room.     General Precautions: Standard, fall  Orthopedic Precautions: RUE non weight bearing  Braces: UE Sling  Respiratory Status: Room air     Functional Mobility:  Bed Mobility:     Supine to Sit: minimum assistance  Transfers:     Sit to Stand:  contact guard assistance with hemiwalker  Gait: 10ft and 15ft with javy-walker and CGA      AM-PAC 6 CLICK MOBILITY          Treatment & Education:  Pt educated on importance of time OOB, importance of intermittent mobility, safe techniques for transfers/ambulation, discharge recommendations/options, and use of call light for assistance and fall prevention.      Patient left up in chair with all lines intact, call button in reach, chair alarm on, and RN notified..    GOALS:   Multidisciplinary Problems       Physical Therapy Goals          Problem: Physical Therapy    Goal Priority Disciplines Outcome Goal Variances Interventions   Physical Therapy Goal     PT, PT/OT Ongoing, Progressing     Description: 1. Supine to sit with Stand-by Assistance  2. Sit to stand transfer with Stand-by Assistance  3.. Bed to chair transfer with Supervision using Javy Walker  4. Gait  x 50 feet with Minimal Assistance using Javy Walker.                          Time Tracking:     PT Received On: 02/25/24  PT Start Time: 0905     PT Stop Time: 0935  PT Total Time (min): 30 min     Billable Minutes: Gait Training 10 and Therapeutic Activity 20    Treatment Type:  Treatment  PT/PTA: PTA     Number of PTA visits since last PT visit: 2     02/25/2024

## 2024-02-25 NOTE — PROGRESS NOTES
[facility] Medicine  Telemedicine Progress Note    Patient Name: Paola Ibanez  MRN: [id number]  Patient Class: IP- Inpatient   Admission Date: 2/20/2024  Length of Stay: 5 days  Attending Physician: Nadira Carter MD  Primary Care Provider: May Rangel MD          Subjective:     Principal Problem:Closed fracture of surgical neck of humerus        HPI:  This is a 65 y/o  female w/ pmh of a-fib, HTN who presents w/ right arm pain. Patient reports she was in her kitchen today and her floors are uneven and she stumbled and hit her arm on the cabinet; reports having had 9/10 pain but currently reports she's comfortable; denies headache, blurry vision, chest pain, SOB, palpitations, abdominal pain, N/V, dysuria and back pain. Patient is afebrile, tachycardic, hypertensive and saturating ok. Lab work reflects hyponatremia w/ Na of 131. Right humerus x-ray showed a pathologic fracture through the surgical neck of the humerus with cystic component in the proximal humeral diaphysis. Recommend supplement evaluation with CT scan of the upper extremity once the initial traumatic episode has resolved. A right shoulder x-ray showed a pathologic fracture through the surgical neck of humerus with involvement of greater tubercle. Patient received Lopressor 25 mg PO x1 in the ER and her a-fib w/ RVR has converted to sinus rhythm. Patient reports she was on Eliquis before but it caused her to have bloody BM and she hasn't been on it in years.     Overview/Hospital Course:  Paola Ibanez is a 64 year old female with a past medical history of obesity, HTN, anemia and Afib who presented with Afib with RVR with a R humerus fracture. The Afib is rate controlled on metoprolol. Cardiology has been consulted. Orthopedic Surgery was also consulted and plans to take patient to the OR 2/26. PT/OT has been consulted.      This follow-up encounter was provided through  telemedicine to address  Closed fracture of surgical neck of humerus present on admission.  Patient was transferred to the telemedicine service on:  02/25/2024   The patient location is: 310/310 A admitted 2/20/2024  5:48 PM.      Interval History/Overnight Events:   Clinical record since admit reviewed.    Patient is able to provide adequate history.  Son and grand-daughters at bedside.     Patient with right UE pain but controlled with pain meds.  No CP or palpitations with stable vital signs.   Discussed home diet as BP much better controlled with home meds while hospitalized.  Encouraged low Na/low fat diet. BM on 2/24    Review of Systems   Constitutional:  Positive for activity change, fatigue and fever.   Respiratory:  Negative for shortness of breath.    Musculoskeletal:  Positive for arthralgias and myalgias.          I have reviewed the following on 02/25/2024:    Data  Details     [x]   Lab results reviewed   CBC stable with Hgb 10.3; CMP wnl except for albumin 2.9    []   Micro reports reviewed      []   Pathology reports reviewed      []   Imaging reports reviewed      []   Cardiology Procedure reports reviewed      []   Non- records/CareEverywhere notes reviewed      []  Tests/studies orders placed or verified       []  Independently viewed/assessed       []  02/25/2024 Discussion of:        Inpatient Medications reviewed and prescribed for management of current problems:  Scheduled Meds:   enoxparin  40 mg Subcutaneous Q12H (prophylaxis, 0900/2100)    hydrALAZINE  25 mg Oral Q8H    hydroCHLOROthiazide  25 mg Oral QAM    losartan  100 mg Oral Daily    metoprolol tartrate  50 mg Oral BID    senna-docusate 8.6-50 mg  1 tablet Oral Daily     Continuous Infusions:  PRN Meds:.acetaminophen, aluminum-magnesium hydroxide-simethicone, dextrose 10%, HYDROcodone-acetaminophen, magnesium oxide, magnesium oxide, melatonin, morphine, naloxone, ondansetron, polyethylene glycol, potassium bicarbonate, potassium  bicarbonate, potassium bicarbonate, potassium, sodium phosphates, potassium, sodium phosphates, potassium, sodium phosphates, sodium chloride 0.9%      Objective:     Temp:  [97.8 °F (36.6 °C)-98.9 °F (37.2 °C)] 98.9 °F (37.2 °C)  Pulse:  [65-91] 65  Resp:  [16-18] 17  SpO2:  [95 %-97 %] 96 %  BP: (103-120)/(54-73) 115/67      Intake/Output Summary (Last 24 hours) at 2/25/2024 1505  Last data filed at 2/25/2024 0648  Gross per 24 hour   Intake 600 ml   Output 900 ml   Net -300 ml        Body mass index is 46.49 kg/m².    Physical Exam  Vitals and nursing note reviewed.   Constitutional:       General: She is not in acute distress.     Appearance: She is obese. She is ill-appearing.   HENT:      Head: Normocephalic and atraumatic.   Eyes:      Extraocular Movements: Extraocular movements intact.   Cardiovascular:      Rate and Rhythm: Normal rate.   Pulmonary:      Effort: Pulmonary effort is normal. No tachypnea or respiratory distress.   Musculoskeletal:      Comments: RUE in sling   Neurological:      General: No focal deficit present.      Mental Status: She is alert and oriented to person, place, and time.      Cranial Nerves: No cranial nerve deficit.      Motor: No weakness.   Psychiatric:         Attention and Perception: Attention normal.         Mood and Affect: Mood is anxious.         Speech: Speech normal.         Behavior: Behavior is cooperative.          Labs: All labs within the last 24 hours were reviewed.   Recent Results (from the past 24 hour(s))   Comprehensive Metabolic Panel (CMP)    Collection Time: 02/25/24  3:29 AM   Result Value Ref Range    Sodium 136 136 - 145 mmol/L    Potassium 3.9 3.5 - 5.1 mmol/L    Chloride 104 95 - 110 mmol/L    CO2 24 23 - 29 mmol/L    Glucose 98 70 - 110 mg/dL    BUN 16 8 - 23 mg/dL    Creatinine 0.8 0.5 - 1.4 mg/dL    Calcium 10.0 8.7 - 10.5 mg/dL    Total Protein 6.6 6.0 - 8.4 g/dL    Albumin 2.9 (L) 3.5 - 5.2 g/dL    Total Bilirubin 0.9 0.1 - 1.0 mg/dL     "Alkaline Phosphatase 73 55 - 135 U/L    AST 10 10 - 40 U/L    ALT 11 10 - 44 U/L    eGFR >60 >60 mL/min/1.73 m^2    Anion Gap 8 8 - 16 mmol/L   Magnesium    Collection Time: 02/25/24  3:29 AM   Result Value Ref Range    Magnesium 1.9 1.6 - 2.6 mg/dL   CBC with Automated Differential    Collection Time: 02/25/24  3:29 AM   Result Value Ref Range    WBC 6.67 3.90 - 12.70 K/uL    RBC 3.29 (L) 4.00 - 5.40 M/uL    Hemoglobin 10.3 (L) 12.0 - 16.0 g/dL    Hematocrit 33.0 (L) 37.0 - 48.5 %     (H) 82 - 98 fL    MCH 31.3 (H) 27.0 - 31.0 pg    MCHC 31.2 (L) 32.0 - 36.0 g/dL    RDW 12.7 11.5 - 14.5 %    Platelets 215 150 - 450 K/uL    MPV 10.6 9.2 - 12.9 fL    Immature Granulocytes 0.7 (H) 0.0 - 0.5 %    Gran # (ANC) 3.3 1.8 - 7.7 K/uL    Immature Grans (Abs) 0.05 (H) 0.00 - 0.04 K/uL    Lymph # 2.6 1.0 - 4.8 K/uL    Mono # 0.5 0.3 - 1.0 K/uL    Eos # 0.2 0.0 - 0.5 K/uL    Baso # 0.03 0.00 - 0.20 K/uL    nRBC 0 0 /100 WBC    Gran % 49.3 38.0 - 73.0 %    Lymph % 39.3 18.0 - 48.0 %    Mono % 8.1 4.0 - 15.0 %    Eosinophil % 2.2 0.0 - 8.0 %    Basophil % 0.4 0.0 - 1.9 %    Differential Method Automated         Lab Results   Component Value Date    [id number] Positive (AA) 03/17/2023       Recent Labs   Lab 02/23/24  0553 02/24/24  0337 02/25/24  0329   WBC 5.93 5.63 6.67   LYMPH 31.5  1.9 35.9  2.0 39.3  2.6   HGB 10.6* 10.7* 10.3*   HCT 33.1* 34.0* 33.0*    180 215     Recent Labs   Lab 02/23/24  0553 02/24/24  0337 02/25/24  0329   * 136 136   K 3.8 3.7 3.9    106 104   CO2 27 24 24   BUN 14 13 16   CREATININE 0.6 0.7 0.8    103 98   CALCIUM 9.9 9.7 10.0   MG 1.9 1.9 1.9     Recent Labs   Lab 02/23/24  0553 02/24/24  0337 02/25/24 0329   ALKPHOS 64 71 73   ALT 9* 11 11   AST 12 12 10   ALBUMIN 3.0* 2.5* 2.9*   PROT 6.1 5.8* 6.6   BILITOT 0.6 0.6 0.9        No results for input(s): "DDIMER", "FERRITIN", "CRP", "LDH", "BNP", "TROPONINI", "CPK" in the last 72 hours.    Invalid input(s): " ""PROCALCITONIN"        Microbiology: All microbiology updates for the past 24 hours have been reviewed.  Microbiology Results (last 7 days)       ** No results found for the last 168 hours. **              Imaging All imaging within the last 24 hours was reviewed.   ECG Results              EKG 12-lead (In process)        Collection Time Result Time QRS Duration OHS QTC Calculation    02/22/24 10:47:50 02/22/24 10:57:04 118 465                     In process by Interface, Lab In [facility] (02/22/24 10:57:11)                   Narrative:    Test Reason : I48.91,    Vent. Rate : 124 BPM     Atrial Rate : 087 BPM     P-R Int : 000 ms          QRS Dur : 118 ms      QT Int : 324 ms       P-R-T Axes : 000 -64 059 degrees     QTc Int : 465 ms    Atrial fibrillation with rapid ventricular response  Left anterior fascicular block  LVH with QRS widening  Nonspecific ST abnormality  Abnormal ECG  When compared with ECG of 20-FEB-2024 22:28,  Atrial fibrillation has replaced Sinus rhythm  Vent. rate has increased BY  48 BPM    Referred By: [email or web address]   SELF           Confirmed By:                                      EKG 12-lead (In process)        Collection Time Result Time QRS Duration OHS QTC Calculation    02/20/24 22:28:51 02/21/24 05:22:28 136 450                     In process by Interface, Lab In [facility] (02/21/24 05:22:35)                   Narrative:    Test Reason : I48.91,    Vent. Rate : 076 BPM     Atrial Rate : 076 BPM     P-R Int : 170 ms          QRS Dur : 136 ms      QT Int : 400 ms       P-R-T Axes : 047 -67 026 degrees     QTc Int : 450 ms    Normal sinus rhythm  Possible Left atrial enlargement  Right bundle branch block  Left anterior fascicular block   Bifascicular block   LVH  Abnormal ECG  When compared with ECG of 20-FEB-2024 20:42,  Sinus rhythm has replaced Atrial fibrillation  Vent. rate has decreased BY  41 BPM    Referred By: [email or web address]   SELF           Confirmed By:                                 "      EKG 12-lead (In process)        Collection Time Result Time QRS Duration OHS QTC Calculation    02/20/24 18:05:52 02/21/24 05:21:30 134 488                     In process by Interface, Lab In [facility] (02/21/24 05:21:37)                   Narrative:    Test Reason : R00.0,    Vent. Rate : 112 BPM     Atrial Rate : 125 BPM     P-R Int : 000 ms          QRS Dur : 134 ms      QT Int : 358 ms       P-R-T Axes : 000 -67 064 degrees     QTc Int : 488 ms    Atrial fibrillation with rapid ventricular response with premature  ventricular or aberrantly conducted complexes  Right bundle branch block  Left anterior fascicular block   Bifascicular block   Voltage criteria for left ventricular hypertrophy  Abnormal ECG  When compared with ECG of 17-MAR-2023 19:33,  Atrial fibrillation has replaced Sinus rhythm    Referred By: [email or web address]   SELF           Confirmed By:                                     No results found for this or any previous visit.      NM Bone Scan Whole Body  CMS MANDATED QUALITY DATA- BONE SCAN - 147    There are no plain radiographs for correlation purposes on this patient.    CLINICAL HISTORY:  64 years (1960) Female evaluiate for bone cancer/mets 28mCi 99mTc MDP; LT ARM IV INJ; ; Pt unwilling, unable to remove right arm from across her chest, complains that it is very painful.; ; DX: evaluate for bone cancer/mets    TECHNIQUE:  NM BONE SCAN WHOLE BODY. 1 images obtained. Approximately 3 hours after intravenous injection of 28 mCi Tc-99m MDP, anterior and posterior whole body images were acquired.    COMPARISON:  None available.    FINDINGS:  Mild scattered MDP activity seen in the shoulders, knees and spine compatible with degenerative change. The patient's arms overlap there are chest which may reduce sensitivity.    No focal abnormal uptake is seen to specifically suggest osseous metastatic disease. There are punctate foci of likely urine contamination in the midline pelvis.    The kidneys and  bladder are visualized.    Tracer distribution is otherwise physiologic.    IMPRESSION:  No evidence of osseous metastatic disease.    Electronically signed by:  Paulion Armas MD  02/22/2024 10:18 AM CST Workstation: [id number]            Assessment/Plan:      * Closed fracture of surgical neck of humerus  -Orthopedic Surgery to take to OR 2/26  -NPO midnight 2/26  -PRN analgesics  -PT/OT  -RUE in sling      Macrocytic anemia  Stable.  -Trend Hgb with CBC    Morbid obesity with body mass index (BMI) of 40.0 or higher  Body mass index is 46.49 kg/m². Morbid obesity complicates all aspects of disease management from diagnostic modalities to treatment.        Hypertension  -Continue home hydralazine, HCTZ, metoprolol and losartan  -Continue to monitor    Atrial fibrillation with RVR  RVR resolved.  -Metoprolol BID  -Hold anticoagulation  -Telemetry  -Cardiology following        VTE Risk Mitigation (From admission, onward)           Ordered     enoxaparin injection 40 mg  Every 12 hours         02/24/24 0943     Reason for No Pharmacological VTE Prophylaxis  Once        Comments: NPO for possible surgical intervention   Question:  Reasons:  Answer:  Physician Provided (leave comment)    02/20/24 2209     IP VTE HIGH RISK PATIENT  Once         02/20/24 2209     Place sequential compression device  Until discontinued         02/20/24 2209                    High Risk Conditions:  Patient is currently receiving parenteral controlled substances: Morphine        I have completed this tele-visit with the assistance of a telepresenter.    The attending portion of this evaluation, treatment, and documentation was performed per Nadira Carter MD via Telemedicine AudioVisual using the secure [employer] software platform with 2 way audio/video. The provider was located off-site and the patient is located in the hospital. The aforementioned video software was utilized to document the relevant history and physical exam    Nadira DE LA VEGA  MD Raul  Department of Hospital Medicine   [place] [facility]/Surg

## 2024-02-25 NOTE — SUBJECTIVE & OBJECTIVE
This follow-up encounter was provided through telemedicine to address  Closed fracture of surgical neck of humerus present on admission.  Patient was transferred to the telemedicine service on:  02/25/2024   The patient location is: 310/310 A admitted 2/20/2024  5:48 PM.      Interval History/Overnight Events:   Clinical record since admit reviewed.    Patient is able to provide adequate history.  Son and grand-daughters at bedside.     Patient with right UE pain but controlled with pain meds.  No CP or palpitations with stable vital signs.   Discussed home diet as BP much better controlled with home meds while hospitalized.  Encouraged low Na/low fat diet. BM on 2/24    Review of Systems   Constitutional:  Positive for activity change, fatigue and fever.   Respiratory:  Negative for shortness of breath.    Musculoskeletal:  Positive for arthralgias and myalgias.          I have reviewed the following on 02/25/2024:    Data  Details     [x]   Lab results reviewed   CBC stable with Hgb 10.3; CMP wnl except for albumin 2.9    []   Micro reports reviewed      []   Pathology reports reviewed      []   Imaging reports reviewed      []   Cardiology Procedure reports reviewed      []   Non- records/CareEverywhere notes reviewed      []  Tests/studies orders placed or verified       []  Independently viewed/assessed       []  02/25/2024 Discussion of:        Inpatient Medications reviewed and prescribed for management of current problems:  Scheduled Meds:   enoxparin  40 mg Subcutaneous Q12H (prophylaxis, 0900/2100)    hydrALAZINE  25 mg Oral Q8H    hydroCHLOROthiazide  25 mg Oral QAM    losartan  100 mg Oral Daily    metoprolol tartrate  50 mg Oral BID    senna-docusate 8.6-50 mg  1 tablet Oral Daily     Continuous Infusions:  PRN Meds:.acetaminophen, aluminum-magnesium hydroxide-simethicone, dextrose 10%, HYDROcodone-acetaminophen, magnesium oxide, magnesium oxide, melatonin, morphine, naloxone, ondansetron,  polyethylene glycol, potassium bicarbonate, potassium bicarbonate, potassium bicarbonate, potassium, sodium phosphates, potassium, sodium phosphates, potassium, sodium phosphates, sodium chloride 0.9%      Objective:     Temp:  [97.8 °F (36.6 °C)-98.9 °F (37.2 °C)] 98.9 °F (37.2 °C)  Pulse:  [65-91] 65  Resp:  [16-18] 17  SpO2:  [95 %-97 %] 96 %  BP: (103-120)/(54-73) 115/67      Intake/Output Summary (Last 24 hours) at 2/25/2024 1505  Last data filed at 2/25/2024 0648  Gross per 24 hour   Intake 600 ml   Output 900 ml   Net -300 ml        Body mass index is 46.49 kg/m².    Physical Exam  Vitals and nursing note reviewed.   Constitutional:       General: She is not in acute distress.     Appearance: She is obese. She is ill-appearing.   HENT:      Head: Normocephalic and atraumatic.   Eyes:      Extraocular Movements: Extraocular movements intact.   Cardiovascular:      Rate and Rhythm: Normal rate.   Pulmonary:      Effort: Pulmonary effort is normal. No tachypnea or respiratory distress.   Musculoskeletal:      Comments: RUE in sling   Neurological:      General: No focal deficit present.      Mental Status: She is alert and oriented to person, place, and time.      Cranial Nerves: No cranial nerve deficit.      Motor: No weakness.   Psychiatric:         Attention and Perception: Attention normal.         Mood and Affect: Mood is anxious.         Speech: Speech normal.         Behavior: Behavior is cooperative.          Labs: All labs within the last 24 hours were reviewed.   Recent Results (from the past 24 hour(s))   Comprehensive Metabolic Panel (CMP)    Collection Time: 02/25/24  3:29 AM   Result Value Ref Range    Sodium 136 136 - 145 mmol/L    Potassium 3.9 3.5 - 5.1 mmol/L    Chloride 104 95 - 110 mmol/L    CO2 24 23 - 29 mmol/L    Glucose 98 70 - 110 mg/dL    BUN 16 8 - 23 mg/dL    Creatinine 0.8 0.5 - 1.4 mg/dL    Calcium 10.0 8.7 - 10.5 mg/dL    Total Protein 6.6 6.0 - 8.4 g/dL    Albumin 2.9 (L) 3.5 -  "5.2 g/dL    Total Bilirubin 0.9 0.1 - 1.0 mg/dL    Alkaline Phosphatase 73 55 - 135 U/L    AST 10 10 - 40 U/L    ALT 11 10 - 44 U/L    eGFR >60 >60 mL/min/1.73 m^2    Anion Gap 8 8 - 16 mmol/L   Magnesium    Collection Time: 02/25/24  3:29 AM   Result Value Ref Range    Magnesium 1.9 1.6 - 2.6 mg/dL   CBC with Automated Differential    Collection Time: 02/25/24  3:29 AM   Result Value Ref Range    WBC 6.67 3.90 - 12.70 K/uL    RBC 3.29 (L) 4.00 - 5.40 M/uL    Hemoglobin 10.3 (L) 12.0 - 16.0 g/dL    Hematocrit 33.0 (L) 37.0 - 48.5 %     (H) 82 - 98 fL    MCH 31.3 (H) 27.0 - 31.0 pg    MCHC 31.2 (L) 32.0 - 36.0 g/dL    RDW 12.7 11.5 - 14.5 %    Platelets 215 150 - 450 K/uL    MPV 10.6 9.2 - 12.9 fL    Immature Granulocytes 0.7 (H) 0.0 - 0.5 %    Gran # (ANC) 3.3 1.8 - 7.7 K/uL    Immature Grans (Abs) 0.05 (H) 0.00 - 0.04 K/uL    Lymph # 2.6 1.0 - 4.8 K/uL    Mono # 0.5 0.3 - 1.0 K/uL    Eos # 0.2 0.0 - 0.5 K/uL    Baso # 0.03 0.00 - 0.20 K/uL    nRBC 0 0 /100 WBC    Gran % 49.3 38.0 - 73.0 %    Lymph % 39.3 18.0 - 48.0 %    Mono % 8.1 4.0 - 15.0 %    Eosinophil % 2.2 0.0 - 8.0 %    Basophil % 0.4 0.0 - 1.9 %    Differential Method Automated         Lab Results   Component Value Date    [id number] Positive (AA) 03/17/2023       Recent Labs   Lab 02/23/24  0553 02/24/24  0337 02/25/24  0329   WBC 5.93 5.63 6.67   LYMPH 31.5  1.9 35.9  2.0 39.3  2.6   HGB 10.6* 10.7* 10.3*   HCT 33.1* 34.0* 33.0*    180 215     Recent Labs   Lab 02/23/24  0553 02/24/24  0337 02/25/24  0329   * 136 136   K 3.8 3.7 3.9    106 104   CO2 27 24 24   BUN 14 13 16   CREATININE 0.6 0.7 0.8    103 98   CALCIUM 9.9 9.7 10.0   MG 1.9 1.9 1.9     Recent Labs   Lab 02/23/24  0553 02/24/24  0337 02/25/24  0329   ALKPHOS 64 71 73   ALT 9* 11 11   AST 12 12 10   ALBUMIN 3.0* 2.5* 2.9*   PROT 6.1 5.8* 6.6   BILITOT 0.6 0.6 0.9        No results for input(s): "DDIMER", "FERRITIN", "CRP", "LDH", "BNP", "TROPONINI", " ""CPK" in the last 72 hours.    Invalid input(s): "PROCALCITONIN"        Microbiology: All microbiology updates for the past 24 hours have been reviewed.  Microbiology Results (last 7 days)       ** No results found for the last 168 hours. **              Imaging All imaging within the last 24 hours was reviewed.   ECG Results              EKG 12-lead (In process)        Collection Time Result Time QRS Duration OHS QTC Calculation    02/22/24 10:47:50 02/22/24 10:57:04 118 465                     In process by Interface, Lab In [facility] (02/22/24 10:57:11)                   Narrative:    Test Reason : I48.91,    Vent. Rate : 124 BPM     Atrial Rate : 087 BPM     P-R Int : 000 ms          QRS Dur : 118 ms      QT Int : 324 ms       P-R-T Axes : 000 -64 059 degrees     QTc Int : 465 ms    Atrial fibrillation with rapid ventricular response  Left anterior fascicular block  LVH with QRS widening  Nonspecific ST abnormality  Abnormal ECG  When compared with ECG of 20-FEB-2024 22:28,  Atrial fibrillation has replaced Sinus rhythm  Vent. rate has increased BY  48 BPM    Referred By: [email or web address]   SELF           Confirmed By:                                      EKG 12-lead (In process)        Collection Time Result Time QRS Duration OHS QTC Calculation    02/20/24 22:28:51 02/21/24 05:22:28 136 450                     In process by Interface, Lab In [facility] (02/21/24 05:22:35)                   Narrative:    Test Reason : I48.91,    Vent. Rate : 076 BPM     Atrial Rate : 076 BPM     P-R Int : 170 ms          QRS Dur : 136 ms      QT Int : 400 ms       P-R-T Axes : 047 -67 026 degrees     QTc Int : 450 ms    Normal sinus rhythm  Possible Left atrial enlargement  Right bundle branch block  Left anterior fascicular block   Bifascicular block   LVH  Abnormal ECG  When compared with ECG of 20-FEB-2024 20:42,  Sinus rhythm has replaced Atrial fibrillation  Vent. rate has decreased BY  41 BPM    Referred By: [email or web address]   SELF          "  Confirmed By:                                      EKG 12-lead (In process)        Collection Time Result Time QRS Duration OHS QTC Calculation    02/20/24 18:05:52 02/21/24 05:21:30 134 488                     In process by Interface, Lab In [facility] (02/21/24 05:21:37)                   Narrative:    Test Reason : R00.0,    Vent. Rate : 112 BPM     Atrial Rate : 125 BPM     P-R Int : 000 ms          QRS Dur : 134 ms      QT Int : 358 ms       P-R-T Axes : 000 -67 064 degrees     QTc Int : 488 ms    Atrial fibrillation with rapid ventricular response with premature  ventricular or aberrantly conducted complexes  Right bundle branch block  Left anterior fascicular block   Bifascicular block   Voltage criteria for left ventricular hypertrophy  Abnormal ECG  When compared with ECG of 17-MAR-2023 19:33,  Atrial fibrillation has replaced Sinus rhythm    Referred By: [email or web address]   SELF           Confirmed By:                                     No results found for this or any previous visit.      NM Bone Scan Whole Body  CMS MANDATED QUALITY DATA- BONE SCAN - 147    There are no plain radiographs for correlation purposes on this patient.    CLINICAL HISTORY:  64 years (1960) Female evaluiate for bone cancer/mets 28mCi 99mTc MDP; LT ARM IV INJ; ; Pt unwilling, unable to remove right arm from across her chest, complains that it is very painful.; ; DX: evaluate for bone cancer/mets    TECHNIQUE:  NM BONE SCAN WHOLE BODY. 1 images obtained. Approximately 3 hours after intravenous injection of 28 mCi Tc-99m MDP, anterior and posterior whole body images were acquired.    COMPARISON:  None available.    FINDINGS:  Mild scattered MDP activity seen in the shoulders, knees and spine compatible with degenerative change. The patient's arms overlap there are chest which may reduce sensitivity.    No focal abnormal uptake is seen to specifically suggest osseous metastatic disease. There are punctate foci of likely urine  contamination in the midline pelvis.    The kidneys and bladder are visualized.    Tracer distribution is otherwise physiologic.    IMPRESSION:  No evidence of osseous metastatic disease.    Electronically signed by:  Paulino Armas MD  02/22/2024 10:18 AM [facility] Workstation: [id number]

## 2024-02-26 ENCOUNTER — ANESTHESIA (OUTPATIENT)
Dept: SURGERY | Facility: HOSPITAL | Age: 64
DRG: 493 | End: 2024-02-26
Payer: COMMERCIAL

## 2024-02-26 ENCOUNTER — ANESTHESIA EVENT (OUTPATIENT)
Dept: SURGERY | Facility: HOSPITAL | Age: 64
DRG: 493 | End: 2024-02-26
Payer: COMMERCIAL

## 2024-02-26 LAB
ALBUMIN SERPL BCP-MCNC: 2.8 G/DL (ref 3.5–5.2)
ALP SERPL-CCNC: 69 U/L (ref 55–135)
ALT SERPL W/O P-5'-P-CCNC: 10 U/L (ref 10–44)
ANION GAP SERPL CALC-SCNC: 5 MMOL/L (ref 8–16)
AST SERPL-CCNC: 13 U/L (ref 10–40)
BASOPHILS # BLD AUTO: 0.03 K/UL (ref 0–0.2)
BASOPHILS NFR BLD: 0.7 % (ref 0–1.9)
BILIRUB SERPL-MCNC: 0.8 MG/DL (ref 0.1–1)
BUN SERPL-MCNC: 13 MG/DL (ref 8–23)
CALCIUM SERPL-MCNC: 10.1 MG/DL (ref 8.7–10.5)
CHLORIDE SERPL-SCNC: 106 MMOL/L (ref 95–110)
CO2 SERPL-SCNC: 25 MMOL/L (ref 23–29)
CREAT SERPL-MCNC: 0.7 MG/DL (ref 0.5–1.4)
DIFFERENTIAL METHOD BLD: ABNORMAL
EOSINOPHIL # BLD AUTO: 0.1 K/UL (ref 0–0.5)
EOSINOPHIL NFR BLD: 1.8 % (ref 0–8)
ERYTHROCYTE [DISTWIDTH] IN BLOOD BY AUTOMATED COUNT: 12.8 % (ref 11.5–14.5)
EST. GFR  (NO RACE VARIABLE): >60 ML/MIN/1.73 M^2
GLUCOSE SERPL-MCNC: 95 MG/DL (ref 70–110)
HCT VFR BLD AUTO: 31.5 % (ref 37–48.5)
HGB BLD-MCNC: 9.9 G/DL (ref 12–16)
IMM GRANULOCYTES # BLD AUTO: 0.03 K/UL (ref 0–0.04)
IMM GRANULOCYTES NFR BLD AUTO: 0.7 % (ref 0–0.5)
LYMPHOCYTES # BLD AUTO: 1.6 K/UL (ref 1–4.8)
LYMPHOCYTES NFR BLD: 36 % (ref 18–48)
MAGNESIUM SERPL-MCNC: 1.9 MG/DL (ref 1.6–2.6)
MCH RBC QN AUTO: 31.3 PG (ref 27–31)
MCHC RBC AUTO-ENTMCNC: 31.4 G/DL (ref 32–36)
MCV RBC AUTO: 100 FL (ref 82–98)
MONOCYTES # BLD AUTO: 0.5 K/UL (ref 0.3–1)
MONOCYTES NFR BLD: 10.6 % (ref 4–15)
NEUTROPHILS # BLD AUTO: 2.2 K/UL (ref 1.8–7.7)
NEUTROPHILS NFR BLD: 50.2 % (ref 38–73)
NRBC BLD-RTO: 0 /100 WBC
PLATELET # BLD AUTO: 218 K/UL (ref 150–450)
PMV BLD AUTO: 10.4 FL (ref 9.2–12.9)
POTASSIUM SERPL-SCNC: 3.8 MMOL/L (ref 3.5–5.1)
PROT SERPL-MCNC: 6.4 G/DL (ref 6–8.4)
RBC # BLD AUTO: 3.16 M/UL (ref 4–5.4)
SODIUM SERPL-SCNC: 136 MMOL/L (ref 136–145)
WBC # BLD AUTO: 4.36 K/UL (ref 3.9–12.7)

## 2024-02-26 PROCEDURE — 23615 OPTX PROX HUMRL FX W/INT FIX: CPT | Mod: RT,,, | Performed by: ORTHOPAEDIC SURGERY

## 2024-02-26 PROCEDURE — 36000710: Performed by: ORTHOPAEDIC SURGERY

## 2024-02-26 PROCEDURE — 0PSC36Z REPOSITION RIGHT HUMERAL HEAD WITH INTRAMEDULLARY INTERNAL FIXATION DEVICE, PERCUTANEOUS APPROACH: ICD-10-PCS | Performed by: ORTHOPAEDIC SURGERY

## 2024-02-26 PROCEDURE — 94761 N-INVAS EAR/PLS OXIMETRY MLT: CPT

## 2024-02-26 PROCEDURE — 99900035 HC TECH TIME PER 15 MIN (STAT)

## 2024-02-26 PROCEDURE — 25000003 PHARM REV CODE 250: Performed by: ORTHOPAEDIC SURGERY

## 2024-02-26 PROCEDURE — D9220A PRA ANESTHESIA: Mod: ANES,,, | Performed by: ANESTHESIOLOGY

## 2024-02-26 PROCEDURE — 80053 COMPREHEN METABOLIC PANEL: CPT | Performed by: INTERNAL MEDICINE

## 2024-02-26 PROCEDURE — 63600175 PHARM REV CODE 636 W HCPCS: Performed by: ORTHOPAEDIC SURGERY

## 2024-02-26 PROCEDURE — 71000033 HC RECOVERY, INTIAL HOUR: Performed by: ORTHOPAEDIC SURGERY

## 2024-02-26 PROCEDURE — 83735 ASSAY OF MAGNESIUM: CPT | Performed by: INTERNAL MEDICINE

## 2024-02-26 PROCEDURE — 37000008 HC ANESTHESIA 1ST 15 MINUTES: Performed by: ORTHOPAEDIC SURGERY

## 2024-02-26 PROCEDURE — 11000001 HC ACUTE MED/SURG PRIVATE ROOM

## 2024-02-26 PROCEDURE — 27000221 HC OXYGEN, UP TO 24 HOURS

## 2024-02-26 PROCEDURE — 36000711: Performed by: ORTHOPAEDIC SURGERY

## 2024-02-26 PROCEDURE — 25000003 PHARM REV CODE 250: Performed by: INTERNAL MEDICINE

## 2024-02-26 PROCEDURE — 94799 UNLISTED PULMONARY SVC/PX: CPT | Mod: XB

## 2024-02-26 PROCEDURE — 63600175 PHARM REV CODE 636 W HCPCS: Performed by: ANESTHESIOLOGY

## 2024-02-26 PROCEDURE — 36415 COLL VENOUS BLD VENIPUNCTURE: CPT | Performed by: INTERNAL MEDICINE

## 2024-02-26 PROCEDURE — 27201423 OPTIME MED/SURG SUP & DEVICES STERILE SUPPLY: Performed by: ORTHOPAEDIC SURGERY

## 2024-02-26 PROCEDURE — 25000003 PHARM REV CODE 250: Performed by: PHYSICIAN ASSISTANT

## 2024-02-26 PROCEDURE — 25000003 PHARM REV CODE 250: Performed by: NURSE ANESTHETIST, CERTIFIED REGISTERED

## 2024-02-26 PROCEDURE — C1713 ANCHOR/SCREW BN/BN,TIS/BN: HCPCS | Performed by: ORTHOPAEDIC SURGERY

## 2024-02-26 PROCEDURE — C1769 GUIDE WIRE: HCPCS | Performed by: ORTHOPAEDIC SURGERY

## 2024-02-26 PROCEDURE — 63600175 PHARM REV CODE 636 W HCPCS: Performed by: NURSE ANESTHETIST, CERTIFIED REGISTERED

## 2024-02-26 PROCEDURE — 71000039 HC RECOVERY, EACH ADD'L HOUR: Performed by: ORTHOPAEDIC SURGERY

## 2024-02-26 PROCEDURE — 37000009 HC ANESTHESIA EA ADD 15 MINS: Performed by: ORTHOPAEDIC SURGERY

## 2024-02-26 PROCEDURE — 25000003 PHARM REV CODE 250: Performed by: ANESTHESIOLOGY

## 2024-02-26 PROCEDURE — D9220A PRA ANESTHESIA: Mod: CRNA,,, | Performed by: NURSE ANESTHETIST, CERTIFIED REGISTERED

## 2024-02-26 PROCEDURE — 85025 COMPLETE CBC W/AUTO DIFF WBC: CPT | Performed by: INTERNAL MEDICINE

## 2024-02-26 DEVICE — IMPLANTABLE DEVICE: Type: IMPLANTABLE DEVICE | Site: HUMERUS | Status: FUNCTIONAL

## 2024-02-26 DEVICE — SCREW BONE NL HEXALOBE 3.5 X 2: Type: IMPLANTABLE DEVICE | Site: HUMERUS | Status: FUNCTIONAL

## 2024-02-26 RX ORDER — PROPOFOL 10 MG/ML
VIAL (ML) INTRAVENOUS
Status: DISCONTINUED | OUTPATIENT
Start: 2024-02-26 | End: 2024-02-26

## 2024-02-26 RX ORDER — EPHEDRINE SULFATE 50 MG/ML
INJECTION, SOLUTION INTRAVENOUS
Status: DISCONTINUED | OUTPATIENT
Start: 2024-02-26 | End: 2024-02-26

## 2024-02-26 RX ORDER — ACETAMINOPHEN 10 MG/ML
1000 INJECTION, SOLUTION INTRAVENOUS ONCE
Status: COMPLETED | OUTPATIENT
Start: 2024-02-26 | End: 2024-02-26

## 2024-02-26 RX ORDER — SUCCINYLCHOLINE CHLORIDE 20 MG/ML
INJECTION INTRAMUSCULAR; INTRAVENOUS
Status: DISCONTINUED | OUTPATIENT
Start: 2024-02-26 | End: 2024-02-26

## 2024-02-26 RX ORDER — FENTANYL CITRATE 50 UG/ML
25 INJECTION, SOLUTION INTRAMUSCULAR; INTRAVENOUS EVERY 5 MIN PRN
Status: COMPLETED | OUTPATIENT
Start: 2024-02-26 | End: 2024-02-26

## 2024-02-26 RX ORDER — MIDAZOLAM HYDROCHLORIDE 1 MG/ML
INJECTION INTRAMUSCULAR; INTRAVENOUS
Status: DISCONTINUED | OUTPATIENT
Start: 2024-02-26 | End: 2024-02-26

## 2024-02-26 RX ORDER — ONDANSETRON HYDROCHLORIDE 2 MG/ML
INJECTION, SOLUTION INTRAMUSCULAR; INTRAVENOUS
Status: DISCONTINUED | OUTPATIENT
Start: 2024-02-26 | End: 2024-02-26

## 2024-02-26 RX ORDER — ACETAMINOPHEN 500 MG
1000 TABLET ORAL ONCE
Status: DISCONTINUED | OUTPATIENT
Start: 2024-02-26 | End: 2024-02-26

## 2024-02-26 RX ORDER — ONDANSETRON HYDROCHLORIDE 2 MG/ML
4 INJECTION, SOLUTION INTRAVENOUS ONCE
Status: COMPLETED | OUTPATIENT
Start: 2024-02-26 | End: 2024-02-26

## 2024-02-26 RX ORDER — LIDOCAINE HYDROCHLORIDE 20 MG/ML
INJECTION INTRAVENOUS
Status: DISCONTINUED | OUTPATIENT
Start: 2024-02-26 | End: 2024-02-26

## 2024-02-26 RX ORDER — OXYCODONE HYDROCHLORIDE 5 MG/1
5 TABLET ORAL ONCE AS NEEDED
Status: DISCONTINUED | OUTPATIENT
Start: 2024-02-26 | End: 2024-02-26 | Stop reason: HOSPADM

## 2024-02-26 RX ORDER — ROCURONIUM BROMIDE 10 MG/ML
INJECTION, SOLUTION INTRAVENOUS
Status: DISCONTINUED | OUTPATIENT
Start: 2024-02-26 | End: 2024-02-26

## 2024-02-26 RX ORDER — PHENYLEPHRINE HYDROCHLORIDE 10 MG/ML
INJECTION INTRAVENOUS
Status: DISCONTINUED | OUTPATIENT
Start: 2024-02-26 | End: 2024-02-26

## 2024-02-26 RX ORDER — METOCLOPRAMIDE HYDROCHLORIDE 5 MG/ML
10 INJECTION INTRAMUSCULAR; INTRAVENOUS EVERY 10 MIN PRN
Status: COMPLETED | OUTPATIENT
Start: 2024-02-26 | End: 2024-02-26

## 2024-02-26 RX ORDER — FENTANYL CITRATE 50 UG/ML
INJECTION, SOLUTION INTRAMUSCULAR; INTRAVENOUS
Status: DISCONTINUED | OUTPATIENT
Start: 2024-02-26 | End: 2024-02-26

## 2024-02-26 RX ORDER — KETOROLAC TROMETHAMINE 30 MG/ML
15 INJECTION, SOLUTION INTRAMUSCULAR; INTRAVENOUS ONCE
Status: COMPLETED | OUTPATIENT
Start: 2024-02-26 | End: 2024-02-26

## 2024-02-26 RX ORDER — DEXAMETHASONE SODIUM PHOSPHATE 4 MG/ML
INJECTION, SOLUTION INTRA-ARTICULAR; INTRALESIONAL; INTRAMUSCULAR; INTRAVENOUS; SOFT TISSUE
Status: DISCONTINUED | OUTPATIENT
Start: 2024-02-26 | End: 2024-02-26

## 2024-02-26 RX ADMIN — ONDANSETRON 4 MG: 2 INJECTION INTRAMUSCULAR; INTRAVENOUS at 01:02

## 2024-02-26 RX ADMIN — FENTANYL CITRATE 25 MCG: 50 INJECTION, SOLUTION INTRAMUSCULAR; INTRAVENOUS at 03:02

## 2024-02-26 RX ADMIN — SODIUM CHLORIDE, SODIUM GLUCONATE, SODIUM ACETATE, POTASSIUM CHLORIDE AND MAGNESIUM CHLORIDE: 526; 502; 368; 37; 30 INJECTION, SOLUTION INTRAVENOUS at 01:02

## 2024-02-26 RX ADMIN — MORPHINE SULFATE 1 MG: 2 INJECTION, SOLUTION INTRAMUSCULAR; INTRAVENOUS at 09:02

## 2024-02-26 RX ADMIN — ACETAMINOPHEN 1000 MG: 10 INJECTION INTRAVENOUS at 03:02

## 2024-02-26 RX ADMIN — ONDANSETRON 4 MG: 2 INJECTION INTRAMUSCULAR; INTRAVENOUS at 03:02

## 2024-02-26 RX ADMIN — MIDAZOLAM HYDROCHLORIDE 2 MG: 1 INJECTION, SOLUTION INTRAMUSCULAR; INTRAVENOUS at 12:02

## 2024-02-26 RX ADMIN — FENTANYL CITRATE 25 MCG: 50 INJECTION, SOLUTION INTRAMUSCULAR; INTRAVENOUS at 02:02

## 2024-02-26 RX ADMIN — HYDROCODONE BITARTRATE AND ACETAMINOPHEN 1 TABLET: 5; 325 TABLET ORAL at 04:02

## 2024-02-26 RX ADMIN — HYDROCODONE BITARTRATE AND ACETAMINOPHEN 1 TABLET: 5; 325 TABLET ORAL at 06:02

## 2024-02-26 RX ADMIN — SUCCINYLCHOLINE CHLORIDE 120 MG: 20 INJECTION, SOLUTION INTRAMUSCULAR; INTRAVENOUS at 01:02

## 2024-02-26 RX ADMIN — ENOXAPARIN SODIUM 40 MG: 40 INJECTION SUBCUTANEOUS at 09:02

## 2024-02-26 RX ADMIN — METOPROLOL TARTRATE 50 MG: 50 TABLET, FILM COATED ORAL at 09:02

## 2024-02-26 RX ADMIN — FENTANYL CITRATE 100 MCG: 50 INJECTION, SOLUTION INTRAMUSCULAR; INTRAVENOUS at 01:02

## 2024-02-26 RX ADMIN — CEFAZOLIN 2 G: 2 INJECTION, POWDER, FOR SOLUTION INTRAMUSCULAR; INTRAVENOUS at 01:02

## 2024-02-26 RX ADMIN — KETOROLAC TROMETHAMINE 15 MG: 30 INJECTION INTRAMUSCULAR; INTRAVENOUS at 03:02

## 2024-02-26 RX ADMIN — SODIUM CHLORIDE: 9 INJECTION, SOLUTION INTRAVENOUS at 02:02

## 2024-02-26 RX ADMIN — SODIUM CHLORIDE, SODIUM GLUCONATE, SODIUM ACETATE, POTASSIUM CHLORIDE AND MAGNESIUM CHLORIDE: 526; 502; 368; 37; 30 INJECTION, SOLUTION INTRAVENOUS at 11:02

## 2024-02-26 RX ADMIN — PHENYLEPHRINE HYDROCHLORIDE 200 MCG: 10 INJECTION INTRAVENOUS at 01:02

## 2024-02-26 RX ADMIN — DEXAMETHASONE SODIUM PHOSPHATE 4 MG: 4 INJECTION, SOLUTION INTRA-ARTICULAR; INTRALESIONAL; INTRAMUSCULAR; INTRAVENOUS; SOFT TISSUE at 01:02

## 2024-02-26 RX ADMIN — PROPOFOL 140 MG: 10 INJECTION, EMULSION INTRAVENOUS at 01:02

## 2024-02-26 RX ADMIN — LIDOCAINE HYDROCHLORIDE 75 MG: 20 INJECTION, SOLUTION INTRAVENOUS at 01:02

## 2024-02-26 RX ADMIN — PHENYLEPHRINE HYDROCHLORIDE 100 MCG: 10 INJECTION INTRAVENOUS at 01:02

## 2024-02-26 RX ADMIN — METOCLOPRAMIDE 10 MG: 5 INJECTION, SOLUTION INTRAMUSCULAR; INTRAVENOUS at 02:02

## 2024-02-26 RX ADMIN — HYDRALAZINE HYDROCHLORIDE 25 MG: 25 TABLET ORAL at 05:02

## 2024-02-26 RX ADMIN — HYDRALAZINE HYDROCHLORIDE 25 MG: 25 TABLET ORAL at 10:02

## 2024-02-26 RX ADMIN — ROCURONIUM BROMIDE 5 MG: 10 INJECTION, SOLUTION INTRAVENOUS at 01:02

## 2024-02-26 RX ADMIN — GLYCOPYRROLATE 0.2 MG: 0.2 INJECTION, SOLUTION INTRAMUSCULAR; INTRAVITREAL at 01:02

## 2024-02-26 RX ADMIN — EPHEDRINE SULFATE 25 MG: 50 INJECTION, SOLUTION INTRAMUSCULAR; INTRAVENOUS; SUBCUTANEOUS at 01:02

## 2024-02-26 NOTE — OP NOTE
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  We spent a lot of time padding and securing her to make sure that there be no pressure point and also that her neck was in excellent position.  We now made incision of the lateral aspect of the acromion.  This was for 3 cm.  It was carried down sharply through the skin.  The deltoid was split.  A small rent was made in the rotator cuff.  We placed a small drill hole into the lateral aspect of the humeral head.  A guidewire was inserted down the humeral head and into the shaft.  C-arm confirmed on multiple planes were in good position we now reamed proximally.  We now inserted 150 mm nail into position.  C-arm confirmed was in ideal position.  We placed multiple proximal screws into position.  This was done by gave the screws is close to the articular surfaces possible without penetration.  We did this as that was where our best bone was.  We now placed a distal two locking screws using the out rigor.  At this point we studied the screws and multiple planes and we scrubbed in the construct in multiple planes on C-arm.  It was well reduced with the hardware in perfect position.  We irrigated.  We closed the deltoid with an 0 Vicryl.  We irrigated and closed the subQ with 3-0 Vicryl and the skin with 4-0 Monocryl.  The stab  incisions for the screws were closed with 4-0 nylons.  Sterile dressings were applied and the patient was noted to be in stable condition    Complications: No    Estimated Blood Loss (EBL): 50 mL           Implants:   Implant Name Type Inv. Item Serial No.  Lot No. LRB No. Used Action   NAIL IM POLARUS 3 150 MM RIGHT - [id number]  NAIL IM POLARUS 3 150 MM RIGHT  [id number] 1 Implanted       Specimens:   Specimen (24h ago, onward)      None                    Condition: Good    Disposition: PACU - hemodynamically stable.

## 2024-02-26 NOTE — ANESTHESIA PREPROCEDURE EVALUATION
02/26/2024  Paola Ibanez is a 64 y.o., female.      Pre-op Assessment    I have reviewed the NPO Status.   I have reviewed the Medications.     Review of Systems  Anesthesia Hx:  No problems with previous Anesthesia                Cardiovascular:  Exercise tolerance: good   Hypertension    Dysrhythmias atrial fibrillation         ECG has been reviewed.                          Hepatic/GI:  Hepatic/GI Normal                 Musculoskeletal:  Arthritis   Pathologic fracture right humerus       Spine Disorders: cervical            Neurological:    Neuromuscular Disease,             Peripheral Neuropathy                          Endocrine:        Morbid Obesity / BMI > 40  Psych:  Psychiatric History                  Physical Exam  General: Well nourished    Airway:  Mallampati: III / II  Mouth Opening: Normal  TM Distance: Normal  Tongue: Normal  Neck ROM: Extension Decreased    Dental:  Intact    Chest/Lungs:  Clear to auscultation, Normal Respiratory Rate        Anesthesia Plan  Type of Anesthesia, risks & benefits discussed:    Anesthesia Type: Gen ETT  Intra-op Monitoring Plan: Standard ASA Monitors  Post Op Pain Control Plan: multimodal analgesia and IV/PO Opioids PRN  Induction:  IV  Airway Plan: Direct, Post-Induction  Informed Consent: Informed consent signed with the Patient and all parties understand the risks and agree with anesthesia plan.  All questions answered.   ASA Score: 3    Ready For Surgery From Anesthesia Perspective.     .

## 2024-02-26 NOTE — NURSING
Arrives to room 310 S/P Nailing of right humerus fracture Per  Finger, VSS afebrile right shoulder dressing CDI no redness or drainage noted, Right arm in sling pure wick in place tele monitor in place resting quietly bed low side rails up x 4 O2 2liters nasal cannula

## 2024-02-26 NOTE — TRANSFER OF CARE
"Anesthesia Transfer of Care Note    Patient: Paola Ibanez    Procedure(s) Performed: Procedure(s) (LRB):  INSERTION, INTRAMEDULLARY ROBIN (Right)    Patient location: PACU    Anesthesia Type: general    Transport from OR: Transported from OR on 2-3 L/min O2 by NC with adequate spontaneous ventilation    Post pain: adequate analgesia    Post assessment: no apparent anesthetic complications and tolerated procedure well    Post vital signs: stable    Level of consciousness: awake and alert    Nausea/Vomiting: no nausea/vomiting    Complications: none    Transfer of care protocol was followed      Last vitals: Visit Vitals  BP (!) 160/73 (BP Location: Left forearm, Patient Position: Lying)   Pulse 75   Temp 36.7 °C (98.1 °F) (Skin)   Resp 18   Ht 5' 1" (1.549 m)   Wt 111.6 kg (246 lb)   SpO2 96%   Breastfeeding No   BMI 46.48 kg/m²     "

## 2024-02-26 NOTE — PT/OT/SLP PROGRESS
Physical Therapy      Patient Name:  Paola Ibanez   MRN:  [id number]    Patient to surgery today R humerus fx. Please re consult.

## 2024-02-26 NOTE — PT/OT/SLP PROGRESS
Occupational Therapy      Patient Name:  Paola Ibanez   MRN:  [id number]    Patient not seen today secondary to Other (Comment) (Patient to OR today for surgery with Dr. Gonzalez. Will require new OT order post-operatively.). Will follow-up post operatively.    2/26/2024

## 2024-02-26 NOTE — ANESTHESIA PROCEDURE NOTES
Intubation    Date/Time: 2/26/2024 1:06 PM    Performed by: Bentley Henriquez CRNA  Authorized by: Kelton Harris MD    Intubation:     Induction:  Intravenous    Intubated:  Postinduction    Mask Ventilation:  Easy mask    Attempts:  1    Attempted By:  CRNA    Method of Intubation:  Video laryngoscopy    Blade:  Flores 3    Laryngeal View Grade: Grade I - full view of cords      Difficult Airway Encountered?: No      Complications:  None    Airway Device:  Oral endotracheal tube    Airway Device Size:  7.0    Style/Cuff Inflation:  Cuffed (inflated to minimal occlusive pressure)    Tube secured:  22    Secured at:  The gums    Placement Verified By:  Capnometry    Complicating Factors:  Obesity and short neck    Findings Post-Intubation:  BS equal bilateral

## 2024-02-26 NOTE — SUBJECTIVE & OBJECTIVE
This follow-up encounter was provided through telemedicine to address  Closed fracture of surgical neck of humerus present on admission.  Patient was transferred to the telemedicine service on:  02/25/2024   The patient location is: 310/310 A admitted 2/20/2024  5:48 PM.      Interval History/Overnight Events:     Patient is able to provide adequate history.     Uneventful night and patient with right UE pain but controlled with pain meds.  To OR today for fixation - patient anxious    Review of Systems   Constitutional:  Positive for activity change, fatigue and fever.   Respiratory:  Negative for shortness of breath.    Musculoskeletal:  Positive for arthralgias and myalgias.   Psychiatric/Behavioral:  The patient is nervous/anxious.           I have reviewed the following on 02/26/2024:    Data  Details     [x]   Lab results reviewed   Hgb 9.9; CMP wnl except for albumin 2.8    []   Micro reports reviewed      []   Pathology reports reviewed      []   Imaging reports reviewed      []   Cardiology Procedure reports reviewed      []   Non- records/CareEverywhere notes reviewed      [x]  Tests/studies orders placed or verified   B12, folate    []  Independently viewed/assessed       []  02/26/2024 Discussion of:        Inpatient Medications reviewed and prescribed for management of current problems:  Scheduled Meds:   enoxparin  40 mg Subcutaneous Q12H (prophylaxis, 0900/2100)    hydrALAZINE  25 mg Oral Q8H    hydroCHLOROthiazide  25 mg Oral QAM    losartan  100 mg Oral Daily    metoprolol tartrate  50 mg Oral BID    senna-docusate 8.6-50 mg  1 tablet Oral Daily     Continuous Infusions:  PRN Meds:.acetaminophen, aluminum-magnesium hydroxide-simethicone, dextrose 10%, HYDROcodone-acetaminophen, magnesium oxide, magnesium oxide, melatonin, morphine, naloxone, ondansetron, polyethylene glycol, potassium bicarbonate, potassium bicarbonate, potassium bicarbonate, potassium, sodium phosphates, potassium, sodium  phosphates, potassium, sodium phosphates, sodium chloride 0.9%      Objective:     Temp:  [97.7 °F (36.5 °C)-98.1 °F (36.7 °C)] 97.7 °F (36.5 °C)  Pulse:  [67-92] 88  Resp:  [13-23] 17  SpO2:  [94 %-99 %] 99 %  BP: (119-181)/(57-86) 171/77      Intake/Output Summary (Last 24 hours) at 2/26/2024 1616  Last data filed at 2/26/2024 1459  Gross per 24 hour   Intake 1863.77 ml   Output 1450 ml   Net 413.77 ml          Body mass index is 46.48 kg/m².    Physical Exam  Vitals and nursing note reviewed.   Constitutional:       General: She is not in acute distress.     Appearance: She is obese. She is ill-appearing.   HENT:      Head: Normocephalic and atraumatic.   Eyes:      Extraocular Movements: Extraocular movements intact.   Cardiovascular:      Rate and Rhythm: Normal rate.   Pulmonary:      Effort: Pulmonary effort is normal. No tachypnea or respiratory distress.   Musculoskeletal:      Comments: RUE in sling   Neurological:      General: No focal deficit present.      Mental Status: She is alert and oriented to person, place, and time.      Cranial Nerves: No cranial nerve deficit.      Motor: No weakness.   Psychiatric:         Attention and Perception: Attention normal.         Mood and Affect: Mood is anxious.         Speech: Speech normal.         Behavior: Behavior is cooperative.          Labs: All labs within the last 24 hours were reviewed.   Recent Results (from the past 24 hour(s))   Comprehensive Metabolic Panel (CMP)    Collection Time: 02/26/24  4:40 AM   Result Value Ref Range    Sodium 136 136 - 145 mmol/L    Potassium 3.8 3.5 - 5.1 mmol/L    Chloride 106 95 - 110 mmol/L    CO2 25 23 - 29 mmol/L    Glucose 95 70 - 110 mg/dL    BUN 13 8 - 23 mg/dL    Creatinine 0.7 0.5 - 1.4 mg/dL    Calcium 10.1 8.7 - 10.5 mg/dL    Total Protein 6.4 6.0 - 8.4 g/dL    Albumin 2.8 (L) 3.5 - 5.2 g/dL    Total Bilirubin 0.8 0.1 - 1.0 mg/dL    Alkaline Phosphatase 69 55 - 135 U/L    AST 13 10 - 40 U/L    ALT 10 10 - 44  "U/L    eGFR >60 >60 mL/min/1.73 m^2    Anion Gap 5 (L) 8 - 16 mmol/L   Magnesium    Collection Time: 02/26/24  4:40 AM   Result Value Ref Range    Magnesium 1.9 1.6 - 2.6 mg/dL   CBC with Automated Differential    Collection Time: 02/26/24  4:40 AM   Result Value Ref Range    WBC 4.36 3.90 - 12.70 K/uL    RBC 3.16 (L) 4.00 - 5.40 M/uL    Hemoglobin 9.9 (L) 12.0 - 16.0 g/dL    Hematocrit 31.5 (L) 37.0 - 48.5 %     (H) 82 - 98 fL    MCH 31.3 (H) 27.0 - 31.0 pg    MCHC 31.4 (L) 32.0 - 36.0 g/dL    RDW 12.8 11.5 - 14.5 %    Platelets 218 150 - 450 K/uL    MPV 10.4 9.2 - 12.9 fL    Immature Granulocytes 0.7 (H) 0.0 - 0.5 %    Gran # (ANC) 2.2 1.8 - 7.7 K/uL    Immature Grans (Abs) 0.03 0.00 - 0.04 K/uL    Lymph # 1.6 1.0 - 4.8 K/uL    Mono # 0.5 0.3 - 1.0 K/uL    Eos # 0.1 0.0 - 0.5 K/uL    Baso # 0.03 0.00 - 0.20 K/uL    nRBC 0 0 /100 WBC    Gran % 50.2 38.0 - 73.0 %    Lymph % 36.0 18.0 - 48.0 %    Mono % 10.6 4.0 - 15.0 %    Eosinophil % 1.8 0.0 - 8.0 %    Basophil % 0.7 0.0 - 1.9 %    Differential Method Automated         Lab Results   Component Value Date    [id number] Positive (AA) 03/17/2023       Recent Labs   Lab 02/24/24  0337 02/25/24  0329 02/26/24  0440   WBC 5.63 6.67 4.36   LYMPH 35.9  2.0 39.3  2.6 36.0  1.6   HGB 10.7* 10.3* 9.9*   HCT 34.0* 33.0* 31.5*    215 218       Recent Labs   Lab 02/24/24  0337 02/25/24  0329 02/26/24  0440    136 136   K 3.7 3.9 3.8    104 106   CO2 24 24 25   BUN 13 16 13   CREATININE 0.7 0.8 0.7    98 95   CALCIUM 9.7 10.0 10.1   MG 1.9 1.9 1.9       Recent Labs   Lab 02/24/24  0337 02/25/24 0329 02/26/24  0440   ALKPHOS 71 73 69   ALT 11 11 10   AST 12 10 13   ALBUMIN 2.5* 2.9* 2.8*   PROT 5.8* 6.6 6.4   BILITOT 0.6 0.9 0.8          No results for input(s): "DDIMER", "FERRITIN", "CRP", "LDH", "BNP", "TROPONINI", "CPK" in the last 72 hours.    Invalid input(s): "PROCALCITONIN"        Microbiology: All microbiology updates for the past 24 " hours have been reviewed.  Microbiology Results (last 7 days)       ** No results found for the last 168 hours. **              Imaging All imaging within the last 24 hours was reviewed.   ECG Results              EKG 12-lead (In process)        Collection Time Result Time QRS Duration OHS QTC Calculation    02/22/24 10:47:50 02/22/24 10:57:04 118 465                     In process by Interface, Lab In [facility] (02/22/24 10:57:11)                   Narrative:    Test Reason : I48.91,    Vent. Rate : 124 BPM     Atrial Rate : 087 BPM     P-R Int : 000 ms          QRS Dur : 118 ms      QT Int : 324 ms       P-R-T Axes : 000 -64 059 degrees     QTc Int : 465 ms    Atrial fibrillation with rapid ventricular response  Left anterior fascicular block  LVH with QRS widening  Nonspecific ST abnormality  Abnormal ECG  When compared with ECG of 20-FEB-2024 22:28,  Atrial fibrillation has replaced Sinus rhythm  Vent. rate has increased BY  48 BPM    Referred By: [email or web address]   SELF           Confirmed By:                                      EKG 12-lead (In process)        Collection Time Result Time QRS Duration OHS QTC Calculation    02/20/24 22:28:51 02/21/24 05:22:28 136 450                     In process by Interface, Lab In [facility] (02/21/24 05:22:35)                   Narrative:    Test Reason : I48.91,    Vent. Rate : 076 BPM     Atrial Rate : 076 BPM     P-R Int : 170 ms          QRS Dur : 136 ms      QT Int : 400 ms       P-R-T Axes : 047 -67 026 degrees     QTc Int : 450 ms    Normal sinus rhythm  Possible Left atrial enlargement  Right bundle branch block  Left anterior fascicular block   Bifascicular block   LVH  Abnormal ECG  When compared with ECG of 20-FEB-2024 20:42,  Sinus rhythm has replaced Atrial fibrillation  Vent. rate has decreased BY  41 BPM    Referred By: [email or web address]   SELF           Confirmed By:                                      EKG 12-lead (In process)        Collection Time Result Time QRS Duration  OHS QTC Calculation    02/20/24 18:05:52 02/21/24 05:21:30 134 488                     In process by Interface, Lab In [facility] (02/21/24 05:21:37)                   Narrative:    Test Reason : R00.0,    Vent. Rate : 112 BPM     Atrial Rate : 125 BPM     P-R Int : 000 ms          QRS Dur : 134 ms      QT Int : 358 ms       P-R-T Axes : 000 -67 064 degrees     QTc Int : 488 ms    Atrial fibrillation with rapid ventricular response with premature  ventricular or aberrantly conducted complexes  Right bundle branch block  Left anterior fascicular block   Bifascicular block   Voltage criteria for left ventricular hypertrophy  Abnormal ECG  When compared with ECG of 17-MAR-2023 19:33,  Atrial fibrillation has replaced Sinus rhythm    Referred By: [email or web address]   SELF           Confirmed By:                                      EKG 12-LEAD (Final result)  Result time 02/26/24 12:06:15      Final result by Unknown User (02/26/24 12:06:15)                                        No results found for this or any previous visit.      SURG FL Surgery Fluoro Usage  See OP Notes for results.     IMPRESSION: See OP Notes for results.     This procedure was auto-finalized by: Virtual Radiologist

## 2024-02-26 NOTE — CARE UPDATE
02/26/24 0715   Patient Assessment/Suction   Level of Consciousness (AVPU) alert   Respiratory Effort Normal;Unlabored   PRE-TX-O2   Device (Oxygen Therapy) room air   SpO2 96 %   Pulse Oximetry Type Intermittent   $ Pulse Oximetry - Multiple Charge Pulse Oximetry - Multiple

## 2024-02-26 NOTE — PLAN OF CARE
Pt cleared per anesthesia protocol. Pain and nausea managed. NAD noted. Safety intact. Pt transferred with tele box #[id number] on.

## 2024-02-26 NOTE — PLAN OF CARE
Problem: Adult Inpatient Plan of Care  Goal: Plan of Care Review  Outcome: Ongoing, Progressing     Problem: Adult Inpatient Plan of Care  Goal: Optimal Comfort and Wellbeing  Outcome: Ongoing, Progressing     Pt rested in between bed and recliner this shift. Complaints of pain managed well with prn medication. Continuous cardiac monitoring in place. Sling to R arm noted. Pt to be NPO after midnight pending surgery tomorrow. Purewick in place with good UOP noted. Safety maintained. Needs attended to.

## 2024-02-26 NOTE — PROGRESS NOTES
[facility] Medicine  Telemedicine Progress Note    Patient Name: Paola Ibanez  MRN: [id number]  Patient Class: IP- Inpatient   Admission Date: 2/20/2024  Length of Stay: 6 days  Attending Physician: Nadira Carter MD  Primary Care Provider: May Rangel MD          Subjective:     Principal Problem:Closed fracture of surgical neck of humerus        HPI:  This is a 65 y/o  female w/ pmh of a-fib, HTN who presents w/ right arm pain. Patient reports she was in her kitchen today and her floors are uneven and she stumbled and hit her arm on the cabinet; reports having had 9/10 pain but currently reports she's comfortable; denies headache, blurry vision, chest pain, SOB, palpitations, abdominal pain, N/V, dysuria and back pain. Patient is afebrile, tachycardic, hypertensive and saturating ok. Lab work reflects hyponatremia w/ Na of 131. Right humerus x-ray showed a pathologic fracture through the surgical neck of the humerus with cystic component in the proximal humeral diaphysis. Recommend supplement evaluation with CT scan of the upper extremity once the initial traumatic episode has resolved. A right shoulder x-ray showed a pathologic fracture through the surgical neck of humerus with involvement of greater tubercle. Patient received Lopressor 25 mg PO x1 in the ER and her a-fib w/ RVR has converted to sinus rhythm. Patient reports she was on Eliquis before but it caused her to have bloody BM and she hasn't been on it in years.     Overview/Hospital Course:  Paola Ibanez is a 64 year old female with a past medical history of obesity, HTN, anemia and Afib who presented with Afib with RVR with a R humerus fracture. The Afib is rate controlled on metoprolol. Cardiology has been consulted. Orthopedic Surgery was also consulted and plans to take patient to the OR 2/26. PT/OT has been consulted.      This follow-up encounter was provided through  telemedicine to address  Closed fracture of surgical neck of humerus present on admission.  Patient was transferred to the telemedicine service on:  02/25/2024   The patient location is: 310/310 A admitted 2/20/2024  5:48 PM.      Interval History/Overnight Events:     Patient is able to provide adequate history.     Uneventful night and patient with right UE pain but controlled with pain meds.  To OR today for fixation - patient anxious    Review of Systems   Constitutional:  Positive for activity change, fatigue and fever.   Respiratory:  Negative for shortness of breath.    Musculoskeletal:  Positive for arthralgias and myalgias.   Psychiatric/Behavioral:  The patient is nervous/anxious.           I have reviewed the following on 02/26/2024:    Data  Details     [x]   Lab results reviewed   Hgb 9.9; CMP wnl except for albumin 2.8    []   Micro reports reviewed      []   Pathology reports reviewed      []   Imaging reports reviewed      []   Cardiology Procedure reports reviewed      []   Non-HM records/CareEverywhere notes reviewed      [x]  Tests/studies orders placed or verified   B12, folate    []  Independently viewed/assessed       []  02/26/2024 Discussion of:        Inpatient Medications reviewed and prescribed for management of current problems:  Scheduled Meds:   enoxparin  40 mg Subcutaneous Q12H (prophylaxis, 0900/2100)    hydrALAZINE  25 mg Oral Q8H    hydroCHLOROthiazide  25 mg Oral QAM    losartan  100 mg Oral Daily    metoprolol tartrate  50 mg Oral BID    senna-docusate 8.6-50 mg  1 tablet Oral Daily     Continuous Infusions:  PRN Meds:.acetaminophen, aluminum-magnesium hydroxide-simethicone, dextrose 10%, HYDROcodone-acetaminophen, magnesium oxide, magnesium oxide, melatonin, morphine, naloxone, ondansetron, polyethylene glycol, potassium bicarbonate, potassium bicarbonate, potassium bicarbonate, potassium, sodium phosphates, potassium, sodium phosphates, potassium, sodium phosphates, sodium  chloride 0.9%      Objective:     Temp:  [97.7 °F (36.5 °C)-98.1 °F (36.7 °C)] 97.7 °F (36.5 °C)  Pulse:  [67-92] 88  Resp:  [13-23] 17  SpO2:  [94 %-99 %] 99 %  BP: (119-181)/(57-86) 171/77      Intake/Output Summary (Last 24 hours) at 2/26/2024 1616  Last data filed at 2/26/2024 1459  Gross per 24 hour   Intake 1863.77 ml   Output 1450 ml   Net 413.77 ml          Body mass index is 46.48 kg/m².    Physical Exam  Vitals and nursing note reviewed.   Constitutional:       General: She is not in acute distress.     Appearance: She is obese. She is ill-appearing.   HENT:      Head: Normocephalic and atraumatic.   Eyes:      Extraocular Movements: Extraocular movements intact.   Cardiovascular:      Rate and Rhythm: Normal rate.   Pulmonary:      Effort: Pulmonary effort is normal. No tachypnea or respiratory distress.   Musculoskeletal:      Comments: RUE in sling   Neurological:      General: No focal deficit present.      Mental Status: She is alert and oriented to person, place, and time.      Cranial Nerves: No cranial nerve deficit.      Motor: No weakness.   Psychiatric:         Attention and Perception: Attention normal.         Mood and Affect: Mood is anxious.         Speech: Speech normal.         Behavior: Behavior is cooperative.          Labs: All labs within the last 24 hours were reviewed.   Recent Results (from the past 24 hour(s))   Comprehensive Metabolic Panel (CMP)    Collection Time: 02/26/24  4:40 AM   Result Value Ref Range    Sodium 136 136 - 145 mmol/L    Potassium 3.8 3.5 - 5.1 mmol/L    Chloride 106 95 - 110 mmol/L    CO2 25 23 - 29 mmol/L    Glucose 95 70 - 110 mg/dL    BUN 13 8 - 23 mg/dL    Creatinine 0.7 0.5 - 1.4 mg/dL    Calcium 10.1 8.7 - 10.5 mg/dL    Total Protein 6.4 6.0 - 8.4 g/dL    Albumin 2.8 (L) 3.5 - 5.2 g/dL    Total Bilirubin 0.8 0.1 - 1.0 mg/dL    Alkaline Phosphatase 69 55 - 135 U/L    AST 13 10 - 40 U/L    ALT 10 10 - 44 U/L    eGFR >60 >60 mL/min/1.73 m^2    Anion Gap 5  "(L) 8 - 16 mmol/L   Magnesium    Collection Time: 02/26/24  4:40 AM   Result Value Ref Range    Magnesium 1.9 1.6 - 2.6 mg/dL   CBC with Automated Differential    Collection Time: 02/26/24  4:40 AM   Result Value Ref Range    WBC 4.36 3.90 - 12.70 K/uL    RBC 3.16 (L) 4.00 - 5.40 M/uL    Hemoglobin 9.9 (L) 12.0 - 16.0 g/dL    Hematocrit 31.5 (L) 37.0 - 48.5 %     (H) 82 - 98 fL    MCH 31.3 (H) 27.0 - 31.0 pg    MCHC 31.4 (L) 32.0 - 36.0 g/dL    RDW 12.8 11.5 - 14.5 %    Platelets 218 150 - 450 K/uL    MPV 10.4 9.2 - 12.9 fL    Immature Granulocytes 0.7 (H) 0.0 - 0.5 %    Gran # (ANC) 2.2 1.8 - 7.7 K/uL    Immature Grans (Abs) 0.03 0.00 - 0.04 K/uL    Lymph # 1.6 1.0 - 4.8 K/uL    Mono # 0.5 0.3 - 1.0 K/uL    Eos # 0.1 0.0 - 0.5 K/uL    Baso # 0.03 0.00 - 0.20 K/uL    nRBC 0 0 /100 WBC    Gran % 50.2 38.0 - 73.0 %    Lymph % 36.0 18.0 - 48.0 %    Mono % 10.6 4.0 - 15.0 %    Eosinophil % 1.8 0.0 - 8.0 %    Basophil % 0.7 0.0 - 1.9 %    Differential Method Automated         Lab Results   Component Value Date    [id number] Positive (AA) 03/17/2023       Recent Labs   Lab 02/24/24  0337 02/25/24  0329 02/26/24  0440   WBC 5.63 6.67 4.36   LYMPH 35.9  2.0 39.3  2.6 36.0  1.6   HGB 10.7* 10.3* 9.9*   HCT 34.0* 33.0* 31.5*    215 218       Recent Labs   Lab 02/24/24 0337 02/25/24  0329 02/26/24  0440    136 136   K 3.7 3.9 3.8    104 106   CO2 24 24 25   BUN 13 16 13   CREATININE 0.7 0.8 0.7    98 95   CALCIUM 9.7 10.0 10.1   MG 1.9 1.9 1.9       Recent Labs   Lab 02/24/24  0337 02/25/24  0329 02/26/24  0440   ALKPHOS 71 73 69   ALT 11 11 10   AST 12 10 13   ALBUMIN 2.5* 2.9* 2.8*   PROT 5.8* 6.6 6.4   BILITOT 0.6 0.9 0.8          No results for input(s): "DDIMER", "FERRITIN", "CRP", "LDH", "BNP", "TROPONINI", "CPK" in the last 72 hours.    Invalid input(s): "PROCALCITONIN"        Microbiology: All microbiology updates for the past 24 hours have been reviewed.  Microbiology Results " (last 7 days)       ** No results found for the last 168 hours. **              Imaging All imaging within the last 24 hours was reviewed.   ECG Results              EKG 12-lead (In process)        Collection Time Result Time QRS Duration [facility] QTC Calculation    02/22/24 10:47:50 02/22/24 10:57:04 118 465                     In process by Interface, Lab In [facility] (02/22/24 10:57:11)                   Narrative:    Test Reason : I48.91,    Vent. Rate : 124 BPM     Atrial Rate : 087 BPM     P-R Int : 000 ms          QRS Dur : 118 ms      QT Int : 324 ms       P-R-T Axes : 000 -64 059 degrees     QTc Int : 465 ms    Atrial fibrillation with rapid ventricular response  Left anterior fascicular block  LVH with QRS widening  Nonspecific ST abnormality  Abnormal ECG  When compared with ECG of 20-FEB-2024 22:28,  Atrial fibrillation has replaced Sinus rhythm  Vent. rate has increased BY  48 BPM    Referred By: [email or web address]   SELF           Confirmed By:                                      EKG 12-lead (In process)        Collection Time Result Time QRS Duration OHS QTC Calculation    02/20/24 22:28:51 02/21/24 05:22:28 136 450                     In process by Interface, Lab In [facility] (02/21/24 05:22:35)                   Narrative:    Test Reason : I48.91,    Vent. Rate : 076 BPM     Atrial Rate : 076 BPM     P-R Int : 170 ms          QRS Dur : 136 ms      QT Int : 400 ms       P-R-T Axes : 047 -67 026 degrees     QTc Int : 450 ms    Normal sinus rhythm  Possible Left atrial enlargement  Right bundle branch block  Left anterior fascicular block   Bifascicular block   LVH  Abnormal ECG  When compared with ECG of 20-FEB-2024 20:42,  Sinus rhythm has replaced Atrial fibrillation  Vent. rate has decreased BY  41 BPM    Referred By: [email or web address]   SELF           Confirmed By:                                      EKG 12-lead (In process)        Collection Time Result Time QRS Duration [facility] QTC Calculation    02/20/24 18:05:52 02/21/24  05:21:30 134 488                     In process by Interface, Lab In [facility] (02/21/24 05:21:37)                   Narrative:    Test Reason : R00.0,    Vent. Rate : 112 BPM     Atrial Rate : 125 BPM     P-R Int : 000 ms          QRS Dur : 134 ms      QT Int : 358 ms       P-R-T Axes : 000 -67 064 degrees     QTc Int : 488 ms    Atrial fibrillation with rapid ventricular response with premature  ventricular or aberrantly conducted complexes  Right bundle branch block  Left anterior fascicular block   Bifascicular block   Voltage criteria for left ventricular hypertrophy  Abnormal ECG  When compared with ECG of 17-MAR-2023 19:33,  Atrial fibrillation has replaced Sinus rhythm    Referred By: [email or web address]   SELF           Confirmed By:                                      EKG 12-LEAD (Final result)  Result time 02/26/24 12:06:15      Final result by Unknown User (02/26/24 12:06:15)                                        No results found for this or any previous visit.      SURG FL Surgery Fluoro Usage  See OP Notes for results.     IMPRESSION: See OP Notes for results.     This procedure was auto-finalized by: Virtual Radiologist            Assessment/Plan:      * Closed fracture of surgical neck of humerus  -Orthopedic Surgery to take to OR 2/26  -NPO midnight 2/26  -PRN analgesics  -PT/OT  -RUE in sling      Macrocytic anemia  Stable.  -Trend Hgb with CBC  -B12, folate ordered    Morbid obesity with body mass index (BMI) of 40.0 or higher  Body mass index is 46.49 kg/m². Morbid obesity complicates all aspects of disease management from diagnostic modalities to treatment.        Hypertension  -Continue home hydralazine, HCTZ, metoprolol and losartan  -Continue to monitor    Atrial fibrillation with RVR  RVR resolved.  -Metoprolol BID  -Hold anticoagulation  -Telemetry  -Cardiology following        VTE Risk Mitigation (From admission, onward)           Ordered     enoxaparin injection 40 mg  Every 12 hours          02/24/24 0943     Reason for No Pharmacological VTE Prophylaxis  Once        Comments: NPO for possible surgical intervention   Question:  Reasons:  Answer:  Physician Provided (leave comment)    02/20/24 2209     IP VTE HIGH RISK PATIENT  Once         02/20/24 2209     Place sequential compression device  Until discontinued         02/20/24 2209                      I have completed this tele-visit with the assistance of a telepresenter.    The attending portion of this evaluation, treatment, and documentation was performed per Nadira Carter MD via Telemedicine AudioVisual using the secure [email or web address] software platform with 2 way audio/video. The provider was located off-site and the patient is located in the hospital. The aforementioned video software was utilized to document the relevant history and physical exam    Nadira Carter MD  Department of Hospital Medicine   [facility]/Surg

## 2024-02-27 PROBLEM — D51.9 B12 DEFICIENCY ANEMIA: Status: ACTIVE | Noted: 2024-02-24

## 2024-02-27 LAB
ALBUMIN SERPL BCP-MCNC: 2.7 G/DL (ref 3.5–5.2)
ALP SERPL-CCNC: 69 U/L (ref 55–135)
ALT SERPL W/O P-5'-P-CCNC: 14 U/L (ref 10–44)
ANION GAP SERPL CALC-SCNC: 6 MMOL/L (ref 8–16)
AST SERPL-CCNC: 17 U/L (ref 10–40)
BASOPHILS # BLD AUTO: 0.02 K/UL (ref 0–0.2)
BASOPHILS NFR BLD: 0.2 % (ref 0–1.9)
BILIRUB SERPL-MCNC: 0.7 MG/DL (ref 0.1–1)
BUN SERPL-MCNC: 19 MG/DL (ref 8–23)
CALCIUM SERPL-MCNC: 10.1 MG/DL (ref 8.7–10.5)
CHLORIDE SERPL-SCNC: 106 MMOL/L (ref 95–110)
CO2 SERPL-SCNC: 24 MMOL/L (ref 23–29)
CREAT SERPL-MCNC: 0.7 MG/DL (ref 0.5–1.4)
DIFFERENTIAL METHOD BLD: ABNORMAL
EOSINOPHIL # BLD AUTO: 0 K/UL (ref 0–0.5)
EOSINOPHIL NFR BLD: 0.1 % (ref 0–8)
ERYTHROCYTE [DISTWIDTH] IN BLOOD BY AUTOMATED COUNT: 12.9 % (ref 11.5–14.5)
EST. GFR  (NO RACE VARIABLE): >60 ML/MIN/1.73 M^2
FOLATE SERPL-MCNC: 6.3 NG/ML (ref 4–24)
GLUCOSE SERPL-MCNC: 103 MG/DL (ref 70–110)
HCT VFR BLD AUTO: 29.3 % (ref 37–48.5)
HGB BLD-MCNC: 9.4 G/DL (ref 12–16)
IMM GRANULOCYTES # BLD AUTO: 0.04 K/UL (ref 0–0.04)
IMM GRANULOCYTES NFR BLD AUTO: 0.5 % (ref 0–0.5)
LYMPHOCYTES # BLD AUTO: 1.2 K/UL (ref 1–4.8)
LYMPHOCYTES NFR BLD: 14.1 % (ref 18–48)
MAGNESIUM SERPL-MCNC: 2 MG/DL (ref 1.6–2.6)
MCH RBC QN AUTO: 31.9 PG (ref 27–31)
MCHC RBC AUTO-ENTMCNC: 32.1 G/DL (ref 32–36)
MCV RBC AUTO: 99 FL (ref 82–98)
MONOCYTES # BLD AUTO: 0.7 K/UL (ref 0.3–1)
MONOCYTES NFR BLD: 9 % (ref 4–15)
NEUTROPHILS # BLD AUTO: 6.2 K/UL (ref 1.8–7.7)
NEUTROPHILS NFR BLD: 76.1 % (ref 38–73)
NRBC BLD-RTO: 0 /100 WBC
PLATELET # BLD AUTO: 242 K/UL (ref 150–450)
PMV BLD AUTO: 10.3 FL (ref 9.2–12.9)
POTASSIUM SERPL-SCNC: 4.4 MMOL/L (ref 3.5–5.1)
PROT SERPL-MCNC: 6.3 G/DL (ref 6–8.4)
RBC # BLD AUTO: 2.95 M/UL (ref 4–5.4)
SODIUM SERPL-SCNC: 136 MMOL/L (ref 136–145)
VIT B12 SERPL-MCNC: <146 PG/ML (ref 210–950)
WBC # BLD AUTO: 8.13 K/UL (ref 3.9–12.7)

## 2024-02-27 PROCEDURE — 94761 N-INVAS EAR/PLS OXIMETRY MLT: CPT

## 2024-02-27 PROCEDURE — 97530 THERAPEUTIC ACTIVITIES: CPT

## 2024-02-27 PROCEDURE — 63600175 PHARM REV CODE 636 W HCPCS: Performed by: ORTHOPAEDIC SURGERY

## 2024-02-27 PROCEDURE — 97535 SELF CARE MNGMENT TRAINING: CPT

## 2024-02-27 PROCEDURE — 25000003 PHARM REV CODE 250: Performed by: ORTHOPAEDIC SURGERY

## 2024-02-27 PROCEDURE — 83735 ASSAY OF MAGNESIUM: CPT | Performed by: ORTHOPAEDIC SURGERY

## 2024-02-27 PROCEDURE — 85025 COMPLETE CBC W/AUTO DIFF WBC: CPT | Performed by: ORTHOPAEDIC SURGERY

## 2024-02-27 PROCEDURE — 80053 COMPREHEN METABOLIC PANEL: CPT | Performed by: ORTHOPAEDIC SURGERY

## 2024-02-27 PROCEDURE — 82607 VITAMIN B-12: CPT | Performed by: INTERNAL MEDICINE

## 2024-02-27 PROCEDURE — 82746 ASSAY OF FOLIC ACID SERUM: CPT | Performed by: INTERNAL MEDICINE

## 2024-02-27 PROCEDURE — 11000001 HC ACUTE MED/SURG PRIVATE ROOM

## 2024-02-27 PROCEDURE — 97168 OT RE-EVAL EST PLAN CARE: CPT

## 2024-02-27 PROCEDURE — 94799 UNLISTED PULMONARY SVC/PX: CPT | Mod: XB

## 2024-02-27 PROCEDURE — 97116 GAIT TRAINING THERAPY: CPT

## 2024-02-27 PROCEDURE — 36415 COLL VENOUS BLD VENIPUNCTURE: CPT | Performed by: INTERNAL MEDICINE

## 2024-02-27 PROCEDURE — 94664 DEMO&/EVAL PT USE INHALER: CPT

## 2024-02-27 PROCEDURE — 99900035 HC TECH TIME PER 15 MIN (STAT)

## 2024-02-27 PROCEDURE — 25000003 PHARM REV CODE 250: Performed by: INTERNAL MEDICINE

## 2024-02-27 RX ORDER — SIMETHICONE 80 MG
2 TABLET,CHEWABLE ORAL 4 TIMES DAILY PRN
Status: DISCONTINUED | OUTPATIENT
Start: 2024-02-27 | End: 2024-02-28 | Stop reason: HOSPADM

## 2024-02-27 RX ORDER — LANOLIN ALCOHOL/MO/W.PET/CERES
2000 CREAM (GRAM) TOPICAL DAILY
Status: DISCONTINUED | OUTPATIENT
Start: 2024-02-27 | End: 2024-02-28 | Stop reason: HOSPADM

## 2024-02-27 RX ADMIN — ENOXAPARIN SODIUM 40 MG: 40 INJECTION SUBCUTANEOUS at 08:02

## 2024-02-27 RX ADMIN — HYDRALAZINE HYDROCHLORIDE 25 MG: 25 TABLET ORAL at 05:02

## 2024-02-27 RX ADMIN — LOSARTAN POTASSIUM 100 MG: 50 TABLET, FILM COATED ORAL at 08:02

## 2024-02-27 RX ADMIN — HYDRALAZINE HYDROCHLORIDE 25 MG: 25 TABLET ORAL at 09:02

## 2024-02-27 RX ADMIN — SIMETHICONE 160 MG: 80 TABLET, CHEWABLE ORAL at 06:02

## 2024-02-27 RX ADMIN — POLYETHYLENE GLYCOL (3350) 17 G: 17 POWDER, FOR SOLUTION ORAL at 09:02

## 2024-02-27 RX ADMIN — HYDROCODONE BITARTRATE AND ACETAMINOPHEN 1 TABLET: 5; 325 TABLET ORAL at 03:02

## 2024-02-27 RX ADMIN — METOPROLOL TARTRATE 50 MG: 50 TABLET, FILM COATED ORAL at 08:02

## 2024-02-27 RX ADMIN — HYDROCODONE BITARTRATE AND ACETAMINOPHEN 1 TABLET: 5; 325 TABLET ORAL at 06:02

## 2024-02-27 RX ADMIN — HYDROCHLOROTHIAZIDE 25 MG: 25 TABLET ORAL at 08:02

## 2024-02-27 RX ADMIN — HYDRALAZINE HYDROCHLORIDE 25 MG: 25 TABLET ORAL at 02:02

## 2024-02-27 RX ADMIN — HYDROCODONE BITARTRATE AND ACETAMINOPHEN 1 TABLET: 5; 325 TABLET ORAL at 08:02

## 2024-02-27 RX ADMIN — MORPHINE SULFATE 1 MG: 2 INJECTION, SOLUTION INTRAMUSCULAR; INTRAVENOUS at 06:02

## 2024-02-27 RX ADMIN — MORPHINE SULFATE 1 MG: 2 INJECTION, SOLUTION INTRAMUSCULAR; INTRAVENOUS at 04:02

## 2024-02-27 RX ADMIN — CYANOCOBALAMIN TAB 1000 MCG 2000 MCG: 1000 TAB at 02:02

## 2024-02-27 RX ADMIN — HYDROCODONE BITARTRATE AND ACETAMINOPHEN 1 TABLET: 5; 325 TABLET ORAL at 02:02

## 2024-02-27 NOTE — ASSESSMENT & PLAN NOTE
Stable.  -Trend Hgb with CBC  -B12 low and folate nl  -start B12 repletion with PCP f/u and monitoring

## 2024-02-27 NOTE — ANESTHESIA POSTPROCEDURE EVALUATION
Anesthesia Post Evaluation    Patient: Paola Ibanez    Procedure(s) Performed: Procedure(s) (LRB):  INSERTION, INTRAMEDULLARY ROBIN (Right)    Final Anesthesia Type: general      Patient location during evaluation: PACU  Patient participation: Yes- Able to Participate  Level of consciousness: awake and alert and oriented  Post-procedure vital signs: reviewed and stable  Pain management: adequate  Airway patency: patent  WINSOME mitigation strategies: Multimodal analgesia, Extubation while patient is awake, Verification of full reversal of neuromuscular block and Extubation and recovery carried out in lateral, semiupright, or other nonsupine position  PONV status at discharge: No PONV  Anesthetic complications: no      Cardiovascular status: blood pressure returned to baseline  Respiratory status: unassisted, spontaneous ventilation and room air  Hydration status: euvolemic  Follow-up not needed.              Vitals Value Taken Time   /56 02/26/24 2332   Temp 36.5 °C (97.7 °F) 02/26/24 2332   Pulse 81 02/26/24 2332   Resp 18 02/26/24 2332   SpO2 92 % 02/26/24 2332         Event Time   Out of Recovery 02/26/2024 15:50:00         Pain/Norris Score: Pain Rating Prior to Med Admin: 0 (2/26/2024 10:39 PM)  Pain Rating Post Med Admin: 0 (2/26/2024 10:39 PM)  Norris Score: 8 (2/26/2024  3:30 PM)

## 2024-02-27 NOTE — PLAN OF CARE
02/26/24 2030   Patient Assessment/Suction   Level of Consciousness (AVPU) alert   Respiratory Effort Normal;Unlabored   Rhythm/Pattern, Respiratory pattern regular   Cough Frequency no cough   PRE-TX-O2   Device (Oxygen Therapy) room air   SpO2 95 %   Pulse Oximetry Type Intermittent   Incentive Spirometer   $ Incentive Spirometer Charges done with encouragement   Incentive Spirometer Predicted Level (mL) 1400   Administration (IS) instruction provided, initial   Number of Repetitions (IS) 5   Level Incentive Spirometer (mL) 1250   Patient Tolerance (IS) good;no adverse signs/symptoms present   Tobacco Cessation Intervention   Do you use any type of tobacco product? No   Respiratory Evaluation   $ Care Plan Tech Time 15 min   Evaluation For New Orders   Admitting Diagnosis humerus fx   Cardiac Diagnosis HTN   Current Surgeries s/p humerus fx repair   Home Oxygen   Has Home Oxygen? No   Home Aerosol, MDI, DPI, and Other Treatments/Therapies   Home Respiratory Therapy Per Patient/Review of Chart No   Oxygen Care Plan   Oxygen Care Plan Per Protocol   SPO2 Goal (%) 92% non-cardiac   Rationale SpO2 is <92% (Non-cardiac Pt.)   Bronchodilator Care Plan   Rationale No Rationale found   Atelectasis Care Plan   Atelectasis Care Plan Incentive Spiromentry   Frequency TID   I.S. Goal (ml) 1400 ml   I.S. Minimum (ml) 700 ml   Rationale Other  (post op)   Airway Clearance Care Plan   Rationale No rationale found

## 2024-02-27 NOTE — PLAN OF CARE
Problem: Adult Inpatient Plan of Care  Goal: Plan of Care Review  Outcome: Ongoing, Progressing     Problem: Adult Inpatient Plan of Care  Goal: Optimal Comfort and Wellbeing  Outcome: Ongoing, Progressing     Pt rested in between bed and recliner this shift. Complaints of pain managed well with prn medication. Continuous cardiac monitoring in place. Sling to R arm noted. Dressing right shoulder CDI. Purewick in place with good UOP noted. Worked well with PT. Safety maintained throughout shift. Needs attended to.

## 2024-02-27 NOTE — PLAN OF CARE
Received call from patient. States she has changed her mind and now will be discharging to home with HH services. Requesting jerad walker, wc, and bsc. Ok with [facility] for HH services    DME orders placed and sent to [facility] for review. [facility] D[facility] not in network with pt's payor    HH referral sent to [facility] for review     02/27/24 1506   Post-Acute Status   Post-Acute Authorization Placement;HME;Home Health   Post-Acute Placement Status Discharge Plan Changed   HME Status Referrals Sent   Home Health Status Referrals Sent

## 2024-02-27 NOTE — ASSESSMENT & PLAN NOTE
-Orthopedic Surgery on 2/26 - s/p IM nailing  -NPO midnight 2/26  -PRN analgesics  -PT/OT  -RUE in ing

## 2024-02-27 NOTE — SUBJECTIVE & OBJECTIVE
"Principal Problem:Closed fracture of surgical neck of humerus    Principal Orthopedic Problem: S/P R proximal humerus IM nail    Interval History: none    Review of patient's allergies indicates:   Allergen Reactions    Aspirin Nausea Only     Stomach Pain  Stomach Pain      Lisinopril Other (See Comments)     Cough  Cough      Penicillin g potassium Rash     Rash  Rash         Current Facility-Administered Medications   Medication    acetaminophen tablet 650 mg    aluminum-magnesium hydroxide-simethicone 200-200-20 mg/5 mL suspension 30 mL    dextrose 10% bolus 250 mL 250 mL    enoxaparin injection 40 mg    hydrALAZINE tablet 25 mg    hydroCHLOROthiazide tablet 25 mg    HYDROcodone-acetaminophen 5-325 mg per tablet 1 tablet    losartan tablet 100 mg    magnesium oxide tablet 800 mg    magnesium oxide tablet 800 mg    melatonin tablet 6 mg    metoprolol tartrate (LOPRESSOR) tablet 50 mg    morphine injection 1 mg    naloxone 0.4 mg/mL injection 0.02 mg    ondansetron injection 4 mg    polyethylene glycol packet 17 g    potassium bicarbonate disintegrating tablet 35 mEq    potassium bicarbonate disintegrating tablet 50 mEq    potassium bicarbonate disintegrating tablet 60 mEq    potassium, sodium phosphates 280-160-250 mg packet 2 packet    potassium, sodium phosphates 280-160-250 mg packet 2 packet    potassium, sodium phosphates 280-160-250 mg packet 2 packet    senna-docusate 8.6-50 mg per tablet 1 tablet    sodium chloride 0.9% flush 2 mL     Objective:     Vital Signs (Most Recent):  Temp: 97.5 °F (36.4 °C) (02/27/24 0752)  Pulse: 77 (02/27/24 0752)  Resp: 18 (02/27/24 0834)  BP: 133/60 (02/27/24 0833)  SpO2: 95 % (02/27/24 0752) Vital Signs (24h Range):  Temp:  [97.5 °F (36.4 °C)-98.7 °F (37.1 °C)] 97.5 °F (36.4 °C)  Pulse:  [] 77  Resp:  [13-23] 18  SpO2:  [92 %-99 %] 95 %  BP: (104-181)/(56-86) 133/60     Weight: 106 kg (233 lb 11 oz)  Height: 5' 1" (154.9 cm)  Body mass index is 44.15 " kg/m².      Intake/Output Summary (Last 24 hours) at 2/27/2024 1015  Last data filed at 2/27/2024 0659  Gross per 24 hour   Intake 1580 ml   Output 1100 ml   Net 480 ml        General    Nursing note and vitals reviewed.  Constitutional: She is oriented to person, place, and time. She appears well-developed and well-nourished.   Pulmonary/Chest: Effort normal.   Neurological: She is alert and oriented to person, place, and time.   Psychiatric: She has a normal mood and affect. Her behavior is normal.         Right Shoulder Exam     Comments:  RUE DNVI. Dressing C/D/I       Significant Labs: CBC:   Recent Labs   Lab 02/26/24  0440 02/27/24  0439   WBC 4.36 8.13   HGB 9.9* 9.4*   HCT 31.5* 29.3*    242     CMP:   Recent Labs   Lab 02/26/24  0440 02/27/24  0439    136   K 3.8 4.4    106   CO2 25 24   GLU 95 103   BUN 13 19   CREATININE 0.7 0.7   CALCIUM 10.1 10.1   PROT 6.4 6.3   ALBUMIN 2.8* 2.7*   BILITOT 0.8 0.7   ALKPHOS 69 69   AST 13 17   ALT 10 14   ANIONGAP 5* 6*     All pertinent labs within the past 24 hours have been reviewed.    Significant Imaging: None

## 2024-02-27 NOTE — PROGRESS NOTES
[facility] Medicine  Telemedicine Progress Note    Patient Name: Paola Ibanez  MRN: [id number]  Patient Class: IP- Inpatient   Admission Date: 2/20/2024  Length of Stay: 7 days  Attending Physician: Nadira Carter MD  Primary Care Provider: May Rangel MD          Subjective:     Principal Problem:Closed fracture of surgical neck of humerus        HPI:  This is a 65 y/o  female w/ pmh of a-fib, HTN who presents w/ right arm pain. Patient reports she was in her kitchen today and her floors are uneven and she stumbled and hit her arm on the cabinet; reports having had 9/10 pain but currently reports she's comfortable; denies headache, blurry vision, chest pain, SOB, palpitations, abdominal pain, N/V, dysuria and back pain. Patient is afebrile, tachycardic, hypertensive and saturating ok. Lab work reflects hyponatremia w/ Na of 131. Right humerus x-ray showed a pathologic fracture through the surgical neck of the humerus with cystic component in the proximal humeral diaphysis. Recommend supplement evaluation with CT scan of the upper extremity once the initial traumatic episode has resolved. A right shoulder x-ray showed a pathologic fracture through the surgical neck of humerus with involvement of greater tubercle. Patient received Lopressor 25 mg PO x1 in the ER and her a-fib w/ RVR has converted to sinus rhythm. Patient reports she was on Eliquis before but it caused her to have bloody BM and she hasn't been on it in years.     Overview/Hospital Course:  Paola Ibanez is a 64 year old female with a past medical history of obesity, HTN, anemia and Afib who presented with Afib with RVR with a R humerus fracture. The Afib is rate controlled on metoprolol. Cardiology has been consulted. Orthopedic Surgery was also consulted and plans to take patient to the OR 2/26. PT/OT has been consulted.      This follow-up encounter was provided through  telemedicine to address  Closed fracture of surgical neck of humerus present on admission.  Patient was transferred to the telemedicine service on:  02/25/2024   The patient location is: 310/310 A admitted 2/20/2024  5:48 PM.      Interval History/Overnight Events:     Patient is able to provide adequate history.     Uneventful night; some congestion with scratchy throat after surgery but doing IS regularly - flutter valve ordered; right UE pain controlled with current regimen - no much worst than preop pain level; calmer today - answered questions regarding low Na diet    Review of Systems   Constitutional:  Positive for activity change, fatigue and fever.   Respiratory:  Negative for shortness of breath.    Musculoskeletal:  Positive for arthralgias and myalgias.          I have reviewed the following on 02/27/2024:    Data  Details     [x]   Lab results reviewed   Hgb 9.4; CMP wnl except for albumin 2.7; Vit B12 <146 and folate nl    []   Micro reports reviewed      []   Pathology reports reviewed      []   Imaging reports reviewed      []   Cardiology Procedure reports reviewed      []   Non- records/CareEverywhere notes reviewed      []  Tests/studies orders placed or verified      []  Independently viewed/assessed       []  02/27/2024 Discussion of:        Inpatient Medications reviewed and prescribed for management of current problems:  Scheduled Meds:   enoxparin  40 mg Subcutaneous Q12H (prophylaxis, 0900/2100)    hydrALAZINE  25 mg Oral Q8H    hydroCHLOROthiazide  25 mg Oral QAM    losartan  100 mg Oral Daily    metoprolol tartrate  50 mg Oral BID    senna-docusate 8.6-50 mg  1 tablet Oral Daily     Continuous Infusions:  PRN Meds:.acetaminophen, aluminum-magnesium hydroxide-simethicone, dextrose 10%, HYDROcodone-acetaminophen, magnesium oxide, magnesium oxide, melatonin, morphine, naloxone, ondansetron, polyethylene glycol, potassium bicarbonate, potassium bicarbonate, potassium bicarbonate, potassium,  sodium phosphates, potassium, sodium phosphates, potassium, sodium phosphates, sodium chloride 0.9%      Objective:     Temp:  [97.5 °F (36.4 °C)-98.7 °F (37.1 °C)] 97.9 °F (36.6 °C)  Pulse:  [] 68  Resp:  [13-23] 18  SpO2:  [92 %-99 %] 96 %  BP: (104-181)/(56-86) 123/61      Intake/Output Summary (Last 24 hours) at 2/27/2024 1257  Last data filed at 2/27/2024 0659  Gross per 24 hour   Intake 1580 ml   Output 900 ml   Net 680 ml          Body mass index is 44.15 kg/m².    Physical Exam  Vitals and nursing note reviewed.   Constitutional:       General: She is not in acute distress.     Appearance: She is obese. She is ill-appearing.   HENT:      Head: Normocephalic and atraumatic.   Eyes:      Extraocular Movements: Extraocular movements intact.   Cardiovascular:      Rate and Rhythm: Normal rate.   Pulmonary:      Effort: Pulmonary effort is normal. No tachypnea or respiratory distress.   Musculoskeletal:      Comments: RUE in sling   Neurological:      General: No focal deficit present.      Mental Status: She is alert and oriented to person, place, and time.      Cranial Nerves: No cranial nerve deficit.      Motor: No weakness.   Psychiatric:         Attention and Perception: Attention normal.         Mood and Affect: Mood is anxious.         Speech: Speech normal.         Behavior: Behavior is cooperative.          Labs: All labs within the last 24 hours were reviewed.   Recent Results (from the past 24 hour(s))   Comprehensive Metabolic Panel (CMP)    Collection Time: 02/27/24  4:39 AM   Result Value Ref Range    Sodium 136 136 - 145 mmol/L    Potassium 4.4 3.5 - 5.1 mmol/L    Chloride 106 95 - 110 mmol/L    CO2 24 23 - 29 mmol/L    Glucose 103 70 - 110 mg/dL    BUN 19 8 - 23 mg/dL    Creatinine 0.7 0.5 - 1.4 mg/dL    Calcium 10.1 8.7 - 10.5 mg/dL    Total Protein 6.3 6.0 - 8.4 g/dL    Albumin 2.7 (L) 3.5 - 5.2 g/dL    Total Bilirubin 0.7 0.1 - 1.0 mg/dL    Alkaline Phosphatase 69 55 - 135 U/L    AST 17  10 - 40 U/L    ALT 14 10 - 44 U/L    eGFR >60 >60 mL/min/1.73 m^2    Anion Gap 6 (L) 8 - 16 mmol/L   Magnesium    Collection Time: 02/27/24  4:39 AM   Result Value Ref Range    Magnesium 2.0 1.6 - 2.6 mg/dL   CBC with Automated Differential    Collection Time: 02/27/24  4:39 AM   Result Value Ref Range    WBC 8.13 3.90 - 12.70 K/uL    RBC 2.95 (L) 4.00 - 5.40 M/uL    Hemoglobin 9.4 (L) 12.0 - 16.0 g/dL    Hematocrit 29.3 (L) 37.0 - 48.5 %    MCV 99 (H) 82 - 98 fL    MCH 31.9 (H) 27.0 - 31.0 pg    MCHC 32.1 32.0 - 36.0 g/dL    RDW 12.9 11.5 - 14.5 %    Platelets 242 150 - 450 K/uL    MPV 10.3 9.2 - 12.9 fL    Immature Granulocytes 0.5 0.0 - 0.5 %    Gran # (ANC) 6.2 1.8 - 7.7 K/uL    Immature Grans (Abs) 0.04 0.00 - 0.04 K/uL    Lymph # 1.2 1.0 - 4.8 K/uL    Mono # 0.7 0.3 - 1.0 K/uL    Eos # 0.0 0.0 - 0.5 K/uL    Baso # 0.02 0.00 - 0.20 K/uL    nRBC 0 0 /100 WBC    Gran % 76.1 (H) 38.0 - 73.0 %    Lymph % 14.1 (L) 18.0 - 48.0 %    Mono % 9.0 4.0 - 15.0 %    Eosinophil % 0.1 0.0 - 8.0 %    Basophil % 0.2 0.0 - 1.9 %    Differential Method Automated    Vitamin B12    Collection Time: 02/27/24  4:39 AM   Result Value Ref Range    Vitamin B-12 <146 (L) 210 - 950 pg/mL   Folate    Collection Time: 02/27/24  4:39 AM   Result Value Ref Range    Folate 6.3 4.0 - 24.0 ng/mL        Lab Results   Component Value Date    [id number] Positive (AA) 03/17/2023       Recent Labs   Lab 02/25/24 0329 02/26/24 0440 02/27/24 0439   WBC 6.67 4.36 8.13   LYMPH 39.3  2.6 36.0  1.6 14.1*  1.2   HGB 10.3* 9.9* 9.4*   HCT 33.0* 31.5* 29.3*    218 242       Recent Labs   Lab 02/25/24 0329 02/26/24 0440 02/27/24 0439    136 136   K 3.9 3.8 4.4    106 106   CO2 24 25 24   BUN 16 13 19   CREATININE 0.8 0.7 0.7   GLU 98 95 103   CALCIUM 10.0 10.1 10.1   MG 1.9 1.9 2.0       Recent Labs   Lab 02/25/24 0329 02/26/24 0440 02/27/24 0439   ALKPHOS 73 69 69   ALT 11 10 14   AST 10 13 17   ALBUMIN 2.9* 2.8* 2.7*   PROT  "6.6 6.4 6.3   BILITOT 0.9 0.8 0.7          No results for input(s): "DDIMER", "FERRITIN", "CRP", "LDH", "BNP", "TROPONINI", "CPK" in the last 72 hours.    Invalid input(s): "PROCALCITONIN"        Microbiology: All microbiology updates for the past 24 hours have been reviewed.  Microbiology Results (last 7 days)       ** No results found for the last 168 hours. **              Imaging All imaging within the last 24 hours was reviewed.   ECG Results              EKG 12-lead (In process)        Collection Time Result Time QRS Duration OHS QTC Calculation    02/22/24 10:47:50 02/22/24 10:57:04 118 465                     In process by Interface, Lab In [facility] (02/22/24 10:57:11)                   Narrative:    Test Reason : I48.91,    Vent. Rate : 124 BPM     Atrial Rate : 087 BPM     P-R Int : 000 ms          QRS Dur : 118 ms      QT Int : 324 ms       P-R-T Axes : 000 -64 059 degrees     QTc Int : 465 ms    Atrial fibrillation with rapid ventricular response  Left anterior fascicular block  LVH with QRS widening  Nonspecific ST abnormality  Abnormal ECG  When compared with ECG of 20-FEB-2024 22:28,  Atrial fibrillation has replaced Sinus rhythm  Vent. rate has increased BY  48 BPM    Referred By: [email or web address]   SELF           Confirmed By:                                      EKG 12-lead (In process)        Collection Time Result Time QRS Duration OHS QTC Calculation    02/20/24 22:28:51 02/21/24 05:22:28 136 450                     In process by Interface, Lab In [facility] (02/21/24 05:22:35)                   Narrative:    Test Reason : I48.91,    Vent. Rate : 076 BPM     Atrial Rate : 076 BPM     P-R Int : 170 ms          QRS Dur : 136 ms      QT Int : 400 ms       P-R-T Axes : 047 -67 026 degrees     QTc Int : 450 ms    Normal sinus rhythm  Possible Left atrial enlargement  Right bundle branch block  Left anterior fascicular block   Bifascicular block   LVH  Abnormal ECG  When compared with ECG of 20-FEB-2024 " 20:42,  Sinus rhythm has replaced Atrial fibrillation  Vent. rate has decreased BY  41 BPM    Referred By: [email or web address]   SELF           Confirmed By:                                      EKG 12-lead (In process)        Collection Time Result Time QRS Duration OHS QTC Calculation    02/20/24 18:05:52 02/21/24 05:21:30 134 488                     In process by Interface, Lab In [facility] (02/21/24 05:21:37)                   Narrative:    Test Reason : R00.0,    Vent. Rate : 112 BPM     Atrial Rate : 125 BPM     P-R Int : 000 ms          QRS Dur : 134 ms      QT Int : 358 ms       P-R-T Axes : 000 -67 064 degrees     QTc Int : 488 ms    Atrial fibrillation with rapid ventricular response with premature  ventricular or aberrantly conducted complexes  Right bundle branch block  Left anterior fascicular block   Bifascicular block   Voltage criteria for left ventricular hypertrophy  Abnormal ECG  When compared with ECG of 17-MAR-2023 19:33,  Atrial fibrillation has replaced Sinus rhythm    Referred By: [email or web address]   SELF           Confirmed By:                                      EKG 12-LEAD (Final result)  Result time 02/26/24 12:06:15      Final result by Unknown User (02/26/24 12:06:15)                                        No results found for this or any previous visit.      SURG FL Surgery Fluoro Usage  See OP Notes for results.     IMPRESSION: See OP Notes for results.     This procedure was auto-finalized by: Virtual Radiologist            Assessment/Plan:      * Closed fracture of surgical neck of humerus  -Orthopedic Surgery on 2/26 - s/p IM nailing  -NPO midnight 2/26  -PRN analgesics  -PT/OT  -RUE in sling      B12 deficiency anemia  Stable.  -Trend Hgb with CBC  -B12 low and folate nl  -start B12 repletion with PCP f/u and monitoring    Morbid obesity with body mass index (BMI) of 40.0 or higher  Body mass index is 46.49 kg/m². Morbid obesity complicates all aspects of disease management from diagnostic  modalities to treatment.        Hypertension  -Continue home hydralazine, HCTZ, metoprolol and losartan  -Continue to monitor    Atrial fibrillation with RVR  RVR resolved.  -Metoprolol BID  -Hold anticoagulation  -Telemetry  -Cardiology following        VTE Risk Mitigation (From admission, onward)           Ordered     enoxaparin injection 40 mg  Every 12 hours         02/24/24 0943     Reason for No Pharmacological VTE Prophylaxis  Once        Comments: NPO for possible surgical intervention   Question:  Reasons:  Answer:  Physician Provided (leave comment)    02/20/24 2209     IP VTE HIGH RISK PATIENT  Once         02/20/24 2209     Place sequential compression device  Until discontinued         02/20/24 2209                  High Risk Conditions:  Patient is currently receiving parenteral controlled substances: Morphine     I have completed this tele-visit with the assistance of a telepresenter.    The attending portion of this evaluation, treatment, and documentation was performed per Nadira Carter MD via Telemedicine AudioVisual using the secure [employer] software platform with 2 way audio/video. The provider was located off-site and the patient is located in the hospital. The aforementioned video software was utilized to document the relevant history and physical exam    Nadira Carter MD  Department of Hospital Medicine   [facility]/Surg
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
## 2024-02-27 NOTE — CARE UPDATE
02/27/24 0750   Patient Assessment/Suction   Level of Consciousness (AVPU) alert   PRE-TX-O2   Device (Oxygen Therapy) room air   SpO2 (!) 94 %   Pulse Oximetry Type Intermittent   $ Pulse Oximetry - Multiple Charge Pulse Oximetry - Multiple   Pulse 79   Resp 18   Incentive Spirometer   $ Incentive Spirometer Charges other (see comments)  (eating breakfast)

## 2024-02-27 NOTE — PROGRESS NOTES
[facility]/Surg  Orthopedics  Progress Note    Patient Name: Paola Ibanez  MRN: [id number]  Admission Date: 2/20/2024  Hospital Length of Stay: 7 days  Attending Provider: Nadira Carter MD  Primary Care Provider: May Rangel MD  Follow-up For: Procedure(s) (LRB):  INSERTION, INTRAMEDULLARY ROBIN (Right)    Post-Operative Day: 1 Day Post-Op  Subjective:     Principal Problem:Closed fracture of surgical neck of humerus    Principal Orthopedic Problem: S/P R proximal humerus IM nail    Interval History: none    Review of patient's allergies indicates:   Allergen Reactions    Aspirin Nausea Only     Stomach Pain  Stomach Pain      Lisinopril Other (See Comments)     Cough  Cough      Penicillin g potassium Rash     Rash  Rash         Current Facility-Administered Medications   Medication    acetaminophen tablet 650 mg    aluminum-magnesium hydroxide-simethicone 200-200-20 mg/5 mL suspension 30 mL    dextrose 10% bolus 250 mL 250 mL    enoxaparin injection 40 mg    hydrALAZINE tablet 25 mg    hydroCHLOROthiazide tablet 25 mg    HYDROcodone-acetaminophen 5-325 mg per tablet 1 tablet    losartan tablet 100 mg    magnesium oxide tablet 800 mg    magnesium oxide tablet 800 mg    melatonin tablet 6 mg    metoprolol tartrate (LOPRESSOR) tablet 50 mg    morphine injection 1 mg    naloxone 0.4 mg/mL injection 0.02 mg    ondansetron injection 4 mg    polyethylene glycol packet 17 g    potassium bicarbonate disintegrating tablet 35 mEq    potassium bicarbonate disintegrating tablet 50 mEq    potassium bicarbonate disintegrating tablet 60 mEq    potassium, sodium phosphates 280-160-250 mg packet 2 packet    potassium, sodium phosphates 280-160-250 mg packet 2 packet    potassium, sodium phosphates 280-160-250 mg packet 2 packet    senna-docusate 8.6-50 mg per tablet 1 tablet    sodium chloride 0.9% flush 2 mL     Objective:     Vital Signs (Most Recent):  Temp: 97.5 °F (36.4 °C) (02/27/24  "0752)  Pulse: 77 (02/27/24 0752)  Resp: 18 (02/27/24 0834)  BP: 133/60 (02/27/24 0833)  SpO2: 95 % (02/27/24 0752) Vital Signs (24h Range):  Temp:  [97.5 °F (36.4 °C)-98.7 °F (37.1 °C)] 97.5 °F (36.4 °C)  Pulse:  [] 77  Resp:  [13-23] 18  SpO2:  [92 %-99 %] 95 %  BP: (104-181)/(56-86) 133/60     Weight: 106 kg (233 lb 11 oz)  Height: 5' 1" (154.9 cm)  Body mass index is 44.15 kg/m².      Intake/Output Summary (Last 24 hours) at 2/27/2024 1015  Last data filed at 2/27/2024 0659  Gross per 24 hour   Intake 1580 ml   Output 1100 ml   Net 480 ml        General    Nursing note and vitals reviewed.  Constitutional: She is oriented to person, place, and time. She appears well-developed and well-nourished.   Pulmonary/Chest: Effort normal.   Neurological: She is alert and oriented to person, place, and time.   Psychiatric: She has a normal mood and affect. Her behavior is normal.         Right Shoulder Exam     Comments:  RUE DNVI. Dressing C/D/I       Significant Labs: CBC:   Recent Labs   Lab 02/26/24  0440 02/27/24  0439   WBC 4.36 8.13   HGB 9.9* 9.4*   HCT 31.5* 29.3*    242     CMP:   Recent Labs   Lab 02/26/24  0440 02/27/24  0439    136   K 3.8 4.4    106   CO2 25 24   GLU 95 103   BUN 13 19   CREATININE 0.7 0.7   CALCIUM 10.1 10.1   PROT 6.4 6.3   ALBUMIN 2.8* 2.7*   BILITOT 0.8 0.7   ALKPHOS 69 69   AST 13 17   ALT 10 14   ANIONGAP 5* 6*     All pertinent labs within the past 24 hours have been reviewed.    Significant Imaging: None  Assessment/Plan:     * Closed fracture of surgical neck of humerus  RUE in sling at all time  RUE NWB  PT for mobility and LE endurance  OT for ADLs  OK with SNF          RAYMUNDO PECK  Orthopedics  [facility]/Surg    "

## 2024-02-27 NOTE — SUBJECTIVE & OBJECTIVE
This follow-up encounter was provided through telemedicine to address  Closed fracture of surgical neck of humerus present on admission.  Patient was transferred to the telemedicine service on:  02/25/2024   The patient location is: 310/310 A admitted 2/20/2024  5:48 PM.      Interval History/Overnight Events:     Patient is able to provide adequate history.     Uneventful night; some congestion with scratchy throat after surgery but doing IS regularly - flutter valve ordered; right UE pain controlled with current regimen - no much worst than preop pain level; calmer today - answered questions regarding low Na diet    Review of Systems   Constitutional:  Positive for activity change, fatigue and fever.   Respiratory:  Negative for shortness of breath.    Musculoskeletal:  Positive for arthralgias and myalgias.          I have reviewed the following on 02/27/2024:    Data  Details     [x]   Lab results reviewed   Hgb 9.4; CMP wnl except for albumin 2.7; Vit B12 <146 and folate nl    []   Micro reports reviewed      []   Pathology reports reviewed      []   Imaging reports reviewed      []   Cardiology Procedure reports reviewed      []   Non- records/CareEverywhere notes reviewed      []  Tests/studies orders placed or verified      []  Independently viewed/assessed       []  02/27/2024 Discussion of:        Inpatient Medications reviewed and prescribed for management of current problems:  Scheduled Meds:   enoxparin  40 mg Subcutaneous Q12H (prophylaxis, 0900/2100)    hydrALAZINE  25 mg Oral Q8H    hydroCHLOROthiazide  25 mg Oral QAM    losartan  100 mg Oral Daily    metoprolol tartrate  50 mg Oral BID    senna-docusate 8.6-50 mg  1 tablet Oral Daily     Continuous Infusions:  PRN Meds:.acetaminophen, aluminum-magnesium hydroxide-simethicone, dextrose 10%, HYDROcodone-acetaminophen, magnesium oxide, magnesium oxide, melatonin, morphine, naloxone, ondansetron, polyethylene glycol, potassium bicarbonate, potassium  bicarbonate, potassium bicarbonate, potassium, sodium phosphates, potassium, sodium phosphates, potassium, sodium phosphates, sodium chloride 0.9%      Objective:     Temp:  [97.5 °F (36.4 °C)-98.7 °F (37.1 °C)] 97.9 °F (36.6 °C)  Pulse:  [] 68  Resp:  [13-23] 18  SpO2:  [92 %-99 %] 96 %  BP: (104-181)/(56-86) 123/61      Intake/Output Summary (Last 24 hours) at 2/27/2024 1257  Last data filed at 2/27/2024 0659  Gross per 24 hour   Intake 1580 ml   Output 900 ml   Net 680 ml          Body mass index is 44.15 kg/m².    Physical Exam  Vitals and nursing note reviewed.   Constitutional:       General: She is not in acute distress.     Appearance: She is obese. She is ill-appearing.   HENT:      Head: Normocephalic and atraumatic.   Eyes:      Extraocular Movements: Extraocular movements intact.   Cardiovascular:      Rate and Rhythm: Normal rate.   Pulmonary:      Effort: Pulmonary effort is normal. No tachypnea or respiratory distress.   Musculoskeletal:      Comments: RUE in sling   Neurological:      General: No focal deficit present.      Mental Status: She is alert and oriented to person, place, and time.      Cranial Nerves: No cranial nerve deficit.      Motor: No weakness.   Psychiatric:         Attention and Perception: Attention normal.         Mood and Affect: Mood is anxious.         Speech: Speech normal.         Behavior: Behavior is cooperative.          Labs: All labs within the last 24 hours were reviewed.   Recent Results (from the past 24 hour(s))   Comprehensive Metabolic Panel (CMP)    Collection Time: 02/27/24  4:39 AM   Result Value Ref Range    Sodium 136 136 - 145 mmol/L    Potassium 4.4 3.5 - 5.1 mmol/L    Chloride 106 95 - 110 mmol/L    CO2 24 23 - 29 mmol/L    Glucose 103 70 - 110 mg/dL    BUN 19 8 - 23 mg/dL    Creatinine 0.7 0.5 - 1.4 mg/dL    Calcium 10.1 8.7 - 10.5 mg/dL    Total Protein 6.3 6.0 - 8.4 g/dL    Albumin 2.7 (L) 3.5 - 5.2 g/dL    Total Bilirubin 0.7 0.1 - 1.0 mg/dL     Alkaline Phosphatase 69 55 - 135 U/L    AST 17 10 - 40 U/L    ALT 14 10 - 44 U/L    eGFR >60 >60 mL/min/1.73 m^2    Anion Gap 6 (L) 8 - 16 mmol/L   Magnesium    Collection Time: 02/27/24  4:39 AM   Result Value Ref Range    Magnesium 2.0 1.6 - 2.6 mg/dL   CBC with Automated Differential    Collection Time: 02/27/24  4:39 AM   Result Value Ref Range    WBC 8.13 3.90 - 12.70 K/uL    RBC 2.95 (L) 4.00 - 5.40 M/uL    Hemoglobin 9.4 (L) 12.0 - 16.0 g/dL    Hematocrit 29.3 (L) 37.0 - 48.5 %    MCV 99 (H) 82 - 98 fL    MCH 31.9 (H) 27.0 - 31.0 pg    MCHC 32.1 32.0 - 36.0 g/dL    RDW 12.9 11.5 - 14.5 %    Platelets 242 150 - 450 K/uL    MPV 10.3 9.2 - 12.9 fL    Immature Granulocytes 0.5 0.0 - 0.5 %    Gran # (ANC) 6.2 1.8 - 7.7 K/uL    Immature Grans (Abs) 0.04 0.00 - 0.04 K/uL    Lymph # 1.2 1.0 - 4.8 K/uL    Mono # 0.7 0.3 - 1.0 K/uL    Eos # 0.0 0.0 - 0.5 K/uL    Baso # 0.02 0.00 - 0.20 K/uL    nRBC 0 0 /100 WBC    Gran % 76.1 (H) 38.0 - 73.0 %    Lymph % 14.1 (L) 18.0 - 48.0 %    Mono % 9.0 4.0 - 15.0 %    Eosinophil % 0.1 0.0 - 8.0 %    Basophil % 0.2 0.0 - 1.9 %    Differential Method Automated    Vitamin B12    Collection Time: 02/27/24  4:39 AM   Result Value Ref Range    Vitamin B-12 <146 (L) 210 - 950 pg/mL   Folate    Collection Time: 02/27/24  4:39 AM   Result Value Ref Range    Folate 6.3 4.0 - 24.0 ng/mL        Lab Results   Component Value Date    [id number] Positive (AA) 03/17/2023       Recent Labs   Lab 02/25/24 0329 02/26/24 0440 02/27/24  0439   WBC 6.67 4.36 8.13   LYMPH 39.3  2.6 36.0  1.6 14.1*  1.2   HGB 10.3* 9.9* 9.4*   HCT 33.0* 31.5* 29.3*    218 242       Recent Labs   Lab 02/25/24 0329 02/26/24 0440 02/27/24 0439    136 136   K 3.9 3.8 4.4    106 106   CO2 24 25 24   BUN 16 13 19   CREATININE 0.8 0.7 0.7   GLU 98 95 103   CALCIUM 10.0 10.1 10.1   MG 1.9 1.9 2.0       Recent Labs   Lab 02/25/24 0329 02/26/24 0440 02/27/24 0439   ALKPHOS 73 69 69   ALT 11 10 14  "  AST 10 13 17   ALBUMIN 2.9* 2.8* 2.7*   PROT 6.6 6.4 6.3   BILITOT 0.9 0.8 0.7          No results for input(s): "DDIMER", "FERRITIN", "CRP", "LDH", "BNP", "TROPONINI", "CPK" in the last 72 hours.    Invalid input(s): "PROCALCITONIN"        Microbiology: All microbiology updates for the past 24 hours have been reviewed.  Microbiology Results (last 7 days)       ** No results found for the last 168 hours. **              Imaging All imaging within the last 24 hours was reviewed.   ECG Results              EKG 12-lead (In process)        Collection Time Result Time QRS Duration [facility] QTC Calculation    02/22/24 10:47:50 02/22/24 10:57:04 118 465                     In process by Interface, Lab In [facility] (02/22/24 10:57:11)                   Narrative:    Test Reason : I48.91,    Vent. Rate : 124 BPM     Atrial Rate : 087 BPM     P-R Int : 000 ms          QRS Dur : 118 ms      QT Int : 324 ms       P-R-T Axes : 000 -64 059 degrees     QTc Int : 465 ms    Atrial fibrillation with rapid ventricular response  Left anterior fascicular block  LVH with QRS widening  Nonspecific ST abnormality  Abnormal ECG  When compared with ECG of 20-FEB-2024 22:28,  Atrial fibrillation has replaced Sinus rhythm  Vent. rate has increased BY  48 BPM    Referred By: [email or web address]   SELF           Confirmed By:                                      EKG 12-lead (In process)        Collection Time Result Time QRS Duration [facility]HS QTC Calculation    02/20/24 22:28:51 02/21/24 05:22:28 136 450                     In process by Interface, Lab In [facility] (02/21/24 05:22:35)                   Narrative:    Test Reason : I48.91,    Vent. Rate : 076 BPM     Atrial Rate : 076 BPM     P-R Int : 170 ms          QRS Dur : 136 ms      QT Int : 400 ms       P-R-T Axes : 047 -67 026 degrees     QTc Int : 450 ms    Normal sinus rhythm  Possible Left atrial enlargement  Right bundle branch block  Left anterior fascicular block   Bifascicular block   LVH  Abnormal " ECG  When compared with ECG of 20-FEB-2024 20:42,  Sinus rhythm has replaced Atrial fibrillation  Vent. rate has decreased BY  41 BPM    Referred By: [email or web address]   SELF           Confirmed By:                                      EKG 12-lead (In process)        Collection Time Result Time QRS Duration OHS QTC Calculation    02/20/24 18:05:52 02/21/24 05:21:30 134 488                     In process by Interface, Lab In [facility] (02/21/24 05:21:37)                   Narrative:    Test Reason : R00.0,    Vent. Rate : 112 BPM     Atrial Rate : 125 BPM     P-R Int : 000 ms          QRS Dur : 134 ms      QT Int : 358 ms       P-R-T Axes : 000 -67 064 degrees     QTc Int : 488 ms    Atrial fibrillation with rapid ventricular response with premature  ventricular or aberrantly conducted complexes  Right bundle branch block  Left anterior fascicular block   Bifascicular block   Voltage criteria for left ventricular hypertrophy  Abnormal ECG  When compared with ECG of 17-MAR-2023 19:33,  Atrial fibrillation has replaced Sinus rhythm    Referred By: [email or web address]   SELF           Confirmed By:                                      EKG 12-LEAD (Final result)  Result time 02/26/24 12:06:15      Final result by Unknown User (02/26/24 12:06:15)                                        No results found for this or any previous visit.      SURG FL Surgery Fluoro Usage  See OP Notes for results.     IMPRESSION: See OP Notes for results.     This procedure was auto-finalized by: Virtual Radiologist

## 2024-02-27 NOTE — PLAN OF CARE
Problem: Adult Inpatient Plan of Care  Goal: Plan of Care Review  Outcome: Ongoing, Progressing     Problem: Adult Inpatient Plan of Care  Goal: Optimal Comfort and Wellbeing  Outcome: Ongoing, Progressing     . Surgical dressing CDI no drainage noted at this time

## 2024-02-27 NOTE — PLAN OF CARE
Met with patient at bedside to discuss discharge dispo. We discussed HH VS SNF. Pt at first very adamant that she is not going to a nursing home and will go home with home health. We discussed help at home and needed equip then patient asked about placement from home if needed. I explained that is an option but can be challenging and cannot go to ED for placement after DC. Pt now interested in SNF but not 100% decided. In agreement with referral being sent to facilities in [place] only and will go from there. Explained likely DC for tomorrow.     SNF referral sent via [employer]           02/27/24 1305   Post-Acute Status   Post-Acute Authorization Placement   Post-Acute Placement Status Referrals Sent   Coverage [facility]   Patient choice form signed by patient/caregiver List from System Post-Acute Care   Discharge Plan   Discharge Plan A Skilled Nursing Facility   Discharge Plan B Home Health

## 2024-02-27 NOTE — PLAN OF CARE
Goals to be met by: 3/26/24     Patient will increase functional independence with ADLs by performing:    UE Dressing with Moderate Assistance.  LE Dressing with Moderate Assistance.  Grooming while seated with Modified [place].  Toileting from toilet with Moderate Assistance for hygiene and clothing management.   Bathing from  shower chair/bench with Moderate Assistance.  Toilet transfer to toilet with Minimal Assistance.  Increased strength and functional activity tolerance for ADL's/IADL's

## 2024-02-27 NOTE — PT/OT/SLP EVAL
Occupational Therapy   Evaluation    Name: Paola Ibanez  MRN: [id number]  Admitting Diagnosis: Closed fracture of surgical neck of humerus  Recent Surgery: Procedure(s) (LRB):  INSERTION, INTRAMEDULLARY ROBIN (Right) 1 Day Post-Op    Recommendations:     Discharge Recommendations: High Intensity Therapy  Discharge Equipment Recommendations:  bath bench bedside commode  Barriers to discharge:       Assessment:     Paola Ibanez is a 64 y.o. female with a medical diagnosis of Closed fracture of surgical neck of humerus.  She presents with the following performance deficits affecting function: weakness, impaired endurance, impaired self care skills, impaired functional mobility, gait instability, decreased upper extremity function, decreased lower extremity function, decreased ROM, pain, orthopedic precautions.      Rehab Prognosis: Good; patient would benefit from acute skilled OT services to address these deficits and reach maximum level of function.       Plan:     Patient to be seen 6 x/week to address the above listed problems via self-care/home management, therapeutic activities, therapeutic exercises  Plan of Care Expires: 03/23/24  Plan of Care Reviewed with: patient    Subjective     Chief Complaint: Pain  Patient/Family Comments/goals: To feel better and go home    Occupational Profile:  Living Environment: Pt lives alone in 1 story home with 1 RONI. Pt has a son who checks on her. Pt has a tub/shower and standard toilet.   Previous level of function: Independent  Roles and Routines: Mother  Equipment Used at Home: none  Assistance upon Discharge: TBD    Pain/Comfort:  Pain Rating 1: 8/10  Location - Side 1: Right  Location 1: shoulder  Pain Addressed 1: Reposition  Pain Rating Post-Intervention 1: 8/10    Patients cultural, spiritual, Tenriism conflicts given the current situation: yes    Objective:     Communicated with: Nurse Encarnacion prior to session.  Patient found up in chair with chair check, Jennifer  peripheral IV upon OT entry to room.    General Precautions: Standard, fall  Orthopedic Precautions: RUE non weight bearing  Braces: UE Sling  Respiratory Status: Room air    Occupational Performance:      Activities of Daily Living:  Feeding:  minimum assistance    Grooming: moderate assistance    Bathing: maximal assistance    Upper Body Dressing: maximal assistance    Lower Body Dressing: maximal assistance    Toileting: maximal assistance      Cognitive/Visual Perceptual:  Pt alert and followed commands for participation in OT Eval.    Physical Exam:  Upper Extremity Strength:    -       Right Upper Extremity: Immobilized in sling; R hand dominant  -       Left Upper Extremity: WFL    AMPAC 6 Click ADL:  AMPAC Total Score: 14    Treatment & Education:  OT provided education in role of OT. Pt verbalized understanding and was agreeable to OT.  OT provided instruction in home safety and RUE precautions with ADL's/IADL's including review of home set up and DME/AE. Pt verbalized understanding. Further training indicated.  OT provided instruction in precautions/positioning/care RUE including adaptive techniques for ADL's. Pt verbalized understanding. Further training indicated.  OT provided education in calling for assist. Pt verbalized understanding.    Patient left up in chair with all lines intact, call button in reach, chair alarm on, and Nursing staff  present    GOALS:   Multidisciplinary Problems       Occupational Therapy Goals          Problem: Occupational Therapy    Goal Priority Disciplines Outcome Interventions   Occupational Therapy Goal     OT, PT/OT     Description: Goals to be met by: 3/26/24     Patient will increase functional independence with ADLs by performing:    UE Dressing with Moderate Assistance.  LE Dressing with Moderate Assistance.  Grooming while seated with Modified [place].  Toileting from toilet with Moderate Assistance for hygiene and clothing management.   Bathing from  shower  chair/bench with Moderate Assistance.  Toilet transfer to toilet with Minimal Assistance.  Increased strength and functional activity tolerance for ADL's/IADL's                         History:     Past Medical History:   Diagnosis Date    A-fib     Hypertension          Past Surgical History:   Procedure Laterality Date    HYSTERECTOMY         Time Tracking:     OT Date of Treatment: 02/27/24  OT Start Time: 1304  OT Stop Time: 1322  OT Total Time (min): 18 min    Billable Minutes:Eli-amaya 8  Self Care/Home Management 10    2/27/2024

## 2024-02-28 VITALS
DIASTOLIC BLOOD PRESSURE: 60 MMHG | SYSTOLIC BLOOD PRESSURE: 132 MMHG | WEIGHT: 233.69 LBS | RESPIRATION RATE: 17 BRPM | TEMPERATURE: 98 F | HEIGHT: 61 IN | BODY MASS INDEX: 44.12 KG/M2 | HEART RATE: 75 BPM | OXYGEN SATURATION: 97 %

## 2024-02-28 LAB
ALBUMIN SERPL BCP-MCNC: 2.9 G/DL (ref 3.5–5.2)
ALP SERPL-CCNC: 88 U/L (ref 55–135)
ALT SERPL W/O P-5'-P-CCNC: 24 U/L (ref 10–44)
ANION GAP SERPL CALC-SCNC: 7 MMOL/L (ref 8–16)
AST SERPL-CCNC: 30 U/L (ref 10–40)
BASOPHILS # BLD AUTO: 0.03 K/UL (ref 0–0.2)
BASOPHILS NFR BLD: 0.4 % (ref 0–1.9)
BILIRUB SERPL-MCNC: 0.9 MG/DL (ref 0.1–1)
BUN SERPL-MCNC: 16 MG/DL (ref 8–23)
CALCIUM SERPL-MCNC: 10.3 MG/DL (ref 8.7–10.5)
CHLORIDE SERPL-SCNC: 104 MMOL/L (ref 95–110)
CO2 SERPL-SCNC: 24 MMOL/L (ref 23–29)
CREAT SERPL-MCNC: 0.6 MG/DL (ref 0.5–1.4)
DIFFERENTIAL METHOD BLD: ABNORMAL
EOSINOPHIL # BLD AUTO: 0.1 K/UL (ref 0–0.5)
EOSINOPHIL NFR BLD: 0.9 % (ref 0–8)
ERYTHROCYTE [DISTWIDTH] IN BLOOD BY AUTOMATED COUNT: 13.2 % (ref 11.5–14.5)
EST. GFR  (NO RACE VARIABLE): >60 ML/MIN/1.73 M^2
GLUCOSE SERPL-MCNC: 108 MG/DL (ref 70–110)
HCT VFR BLD AUTO: 29.9 % (ref 37–48.5)
HGB BLD-MCNC: 9.5 G/DL (ref 12–16)
IMM GRANULOCYTES # BLD AUTO: 0.06 K/UL (ref 0–0.04)
IMM GRANULOCYTES NFR BLD AUTO: 0.9 % (ref 0–0.5)
LYMPHOCYTES # BLD AUTO: 1.2 K/UL (ref 1–4.8)
LYMPHOCYTES NFR BLD: 17.3 % (ref 18–48)
MAGNESIUM SERPL-MCNC: 1.8 MG/DL (ref 1.6–2.6)
MCH RBC QN AUTO: 31.5 PG (ref 27–31)
MCHC RBC AUTO-ENTMCNC: 31.8 G/DL (ref 32–36)
MCV RBC AUTO: 99 FL (ref 82–98)
MONOCYTES # BLD AUTO: 0.6 K/UL (ref 0.3–1)
MONOCYTES NFR BLD: 8.8 % (ref 4–15)
NEUTROPHILS # BLD AUTO: 4.9 K/UL (ref 1.8–7.7)
NEUTROPHILS NFR BLD: 71.7 % (ref 38–73)
NRBC BLD-RTO: 0 /100 WBC
PLATELET # BLD AUTO: 242 K/UL (ref 150–450)
PMV BLD AUTO: 10.2 FL (ref 9.2–12.9)
POTASSIUM SERPL-SCNC: 4 MMOL/L (ref 3.5–5.1)
PROT SERPL-MCNC: 6.6 G/DL (ref 6–8.4)
RBC # BLD AUTO: 3.02 M/UL (ref 4–5.4)
SODIUM SERPL-SCNC: 135 MMOL/L (ref 136–145)
WBC # BLD AUTO: 6.78 K/UL (ref 3.9–12.7)

## 2024-02-28 PROCEDURE — 25000003 PHARM REV CODE 250: Performed by: NURSE PRACTITIONER

## 2024-02-28 PROCEDURE — 94664 DEMO&/EVAL PT USE INHALER: CPT

## 2024-02-28 PROCEDURE — 83735 ASSAY OF MAGNESIUM: CPT | Performed by: ORTHOPAEDIC SURGERY

## 2024-02-28 PROCEDURE — 97116 GAIT TRAINING THERAPY: CPT | Mod: CQ

## 2024-02-28 PROCEDURE — 97535 SELF CARE MNGMENT TRAINING: CPT

## 2024-02-28 PROCEDURE — 99900035 HC TECH TIME PER 15 MIN (STAT)

## 2024-02-28 PROCEDURE — 36415 COLL VENOUS BLD VENIPUNCTURE: CPT | Performed by: ORTHOPAEDIC SURGERY

## 2024-02-28 PROCEDURE — 25000003 PHARM REV CODE 250: Performed by: ORTHOPAEDIC SURGERY

## 2024-02-28 PROCEDURE — 25000003 PHARM REV CODE 250: Performed by: INTERNAL MEDICINE

## 2024-02-28 PROCEDURE — 99900031 HC PATIENT EDUCATION (STAT)

## 2024-02-28 PROCEDURE — 94799 UNLISTED PULMONARY SVC/PX: CPT | Mod: XB

## 2024-02-28 PROCEDURE — 97530 THERAPEUTIC ACTIVITIES: CPT | Mod: CQ

## 2024-02-28 PROCEDURE — 80053 COMPREHEN METABOLIC PANEL: CPT | Performed by: ORTHOPAEDIC SURGERY

## 2024-02-28 PROCEDURE — 94760 N-INVAS EAR/PLS OXIMETRY 1: CPT

## 2024-02-28 PROCEDURE — 85025 COMPLETE CBC W/AUTO DIFF WBC: CPT | Performed by: ORTHOPAEDIC SURGERY

## 2024-02-28 PROCEDURE — 63600175 PHARM REV CODE 636 W HCPCS: Performed by: ORTHOPAEDIC SURGERY

## 2024-02-28 RX ORDER — BISACODYL 10 MG/1
10 SUPPOSITORY RECTAL DAILY PRN
Status: DISCONTINUED | OUTPATIENT
Start: 2024-02-28 | End: 2024-02-28 | Stop reason: HOSPADM

## 2024-02-28 RX ORDER — AMOXICILLIN 250 MG
1 CAPSULE ORAL 2 TIMES DAILY
Qty: 60 TABLET | Refills: 1 | Status: SHIPPED | OUTPATIENT
Start: 2024-02-28

## 2024-02-28 RX ORDER — HYDROCODONE BITARTRATE AND ACETAMINOPHEN 5; 325 MG/1; MG/1
1 TABLET ORAL EVERY 6 HOURS PRN
Qty: 30 TABLET | Refills: 0 | Status: SHIPPED | OUTPATIENT
Start: 2024-02-28 | End: 2024-04-11 | Stop reason: SDUPTHER

## 2024-02-28 RX ORDER — METOPROLOL TARTRATE 50 MG/1
50 TABLET ORAL 2 TIMES DAILY
Qty: 60 TABLET | Refills: 3 | Status: SHIPPED | OUTPATIENT
Start: 2024-02-28

## 2024-02-28 RX ORDER — CYANOCOBALAMIN (VITAMIN B-12) 2000 MCG
2000 TABLET ORAL DAILY
Qty: 30 TABLET | Refills: 5 | Status: SHIPPED | OUTPATIENT
Start: 2024-02-29

## 2024-02-28 RX ADMIN — CYANOCOBALAMIN TAB 1000 MCG 2000 MCG: 1000 TAB at 09:02

## 2024-02-28 RX ADMIN — LOSARTAN POTASSIUM 100 MG: 50 TABLET, FILM COATED ORAL at 09:02

## 2024-02-28 RX ADMIN — HYDRALAZINE HYDROCHLORIDE 25 MG: 25 TABLET ORAL at 02:02

## 2024-02-28 RX ADMIN — SIMETHICONE 160 MG: 80 TABLET, CHEWABLE ORAL at 03:02

## 2024-02-28 RX ADMIN — ENOXAPARIN SODIUM 40 MG: 40 INJECTION SUBCUTANEOUS at 09:02

## 2024-02-28 RX ADMIN — SENNOSIDES AND DOCUSATE SODIUM 1 TABLET: 8.6; 5 TABLET ORAL at 09:02

## 2024-02-28 RX ADMIN — HYDRALAZINE HYDROCHLORIDE 25 MG: 25 TABLET ORAL at 06:02

## 2024-02-28 RX ADMIN — ACETAMINOPHEN 650 MG: 325 TABLET ORAL at 03:02

## 2024-02-28 RX ADMIN — METOPROLOL TARTRATE 50 MG: 50 TABLET, FILM COATED ORAL at 09:02

## 2024-02-28 RX ADMIN — ACETAMINOPHEN 650 MG: 325 TABLET ORAL at 09:02

## 2024-02-28 RX ADMIN — HYDROCHLOROTHIAZIDE 25 MG: 25 TABLET ORAL at 09:02

## 2024-02-28 RX ADMIN — BISACODYL 10 MG: 10 SUPPOSITORY RECTAL at 06:02

## 2024-02-28 NOTE — ASSESSMENT & PLAN NOTE
Body mass index is 44.15 kg/m². Morbid obesity complicates all aspects of disease management from diagnostic modalities to treatment.

## 2024-02-28 NOTE — PLAN OF CARE
HH order sent to [facility]. Pt accepted. SOC 2/29 02/28/24 1311   Post-Acute Status   Post-Acute Authorization Home Health   Home Health Status Set-up Complete/Auth obtained

## 2024-02-28 NOTE — PT/OT/SLP PROGRESS
Occupational Therapy   Treatment    Name: Paola Ibanez  MRN: [id number]  Admitting Diagnosis:  Closed fracture of surgical neck of humerus  2 Days Post-Op    Recommendations:     Discharge Recommendations: High Intensity Therapy  Discharge Equipment Recommendations:  bath bench  Barriers to discharge:      Assessment:     Paola Ibanez is a 64 y.o. female with a medical diagnosis of Closed fracture of surgical neck of humerus.  Pt agreed to participate in OT. Pt put forth good effort. She continues to present with performance deficits affecting function are weakness, impaired endurance, impaired self care skills, impaired functional mobility, gait instability, decreased upper extremity function, decreased lower extremity function, decreased ROM, pain and orthopedic precautions. Continue OT treatment to maximize safety and independence with ADLs.     Rehab Prognosis:  Good; patient would benefit from acute skilled OT services to address these deficits and reach maximum level of function.       Plan:     Patient to be seen 6 x/week to address the above listed problems via self-care/home management, therapeutic activities, therapeutic exercises  Plan of Care Expires: 03/23/24  Plan of Care Reviewed with: patient    Subjective     Chief Complaint: None stated  Patient/Family Comments/goals: To return to PLOF  Pain/Comfort:  Pain Rating 1: 0/10    Objective:     Communicated with: nurse prior to session.  Patient found up in chair upon OT entry to room.    General Precautions: Standard, fall    Orthopedic Precautions: R UE non weight bearing  Braces: UE Sling  Respiratory Status: Room air     Occupational Performance:     Functional Mobility/Transfers:  Patient completed Sit <> Stand Transfer with contact guard assistance with hemiwalker   Patient completed Toilet Transfer Step Transfer technique with minimum assistance with hemiwalker  Functional Mobility: chair > bathroom > sink > chair using a hemiwalker and CGA  secondary to R LE slow advancement     Activities of Daily Living:  Grooming: Pt washed her left hand standing at the sink with contact guard assistance and brushed her dentures using her L UE and min A seated in chair per patient request.  Toileting: moderate assistance for clothing management secondary to R UE impairment and standing tolerance deficits    Treatment & Education:  Educated on performing functional mobility and ADL's in adherence to orthopedic precautions.  Educated on R UE Non weight bearing status  Therapist provided facilitation and instruction of proper body mechanics, energy conservation and fall prevention strategies during tasks listed above.    Patient left up in chair with all lines intact, call button in reach and chair alarm on.    GOALS:   Multidisciplinary Problems       Occupational Therapy Goals          Problem: Occupational Therapy    Goal Priority Disciplines Outcome Interventions   Occupational Therapy Goal     OT, PT/OT     Description: Goals to be met by: 3/26/24     Patient will increase functional independence with ADLs by performing:    UE Dressing with Moderate Assistance.  LE Dressing with Moderate Assistance.  Grooming while seated with Modified [place].  Toileting from toilet with Moderate Assistance for hygiene and clothing management.   Bathing from  shower chair/bench with Moderate Assistance.  Toilet transfer to toilet with Minimal Assistance.  Increased strength and functional activity tolerance for ADL's/IADL's                         Time Tracking:     OT Date of Treatment: 02/28/24  OT Start Time: 0928  OT Stop Time: 0952  OT Total Time (min): 24 min    Billable Minutes:Self Care/Home Management 24 2/28/2024

## 2024-02-28 NOTE — PLAN OF CARE
[facility] has accepted pt. SOC 2/28 pending HH orders.       Follow up apts scheduled with PCP and cardiology. Requested post op apt via in basket message. Office to contact pt for scheduling

## 2024-02-28 NOTE — SUBJECTIVE & OBJECTIVE
Principal Problem:Closed fracture of surgical neck of humerus    Principal Orthopedic Problem: S/P R humerus IM nail    Interval History: plans to go home    Review of patient's allergies indicates:   Allergen Reactions    Aspirin Nausea Only     Stomach Pain  Stomach Pain      Lisinopril Other (See Comments)     Cough  Cough      Penicillin g potassium Rash     Rash  Rash         Current Facility-Administered Medications   Medication    acetaminophen tablet 650 mg    aluminum-magnesium hydroxide-simethicone 200-200-20 mg/5 mL suspension 30 mL    bisacodyL suppository 10 mg    cyanocobalamin tablet 2,000 mcg    dextrose 10% bolus 250 mL 250 mL    enoxaparin injection 40 mg    hydrALAZINE tablet 25 mg    hydroCHLOROthiazide tablet 25 mg    HYDROcodone-acetaminophen 5-325 mg per tablet 1 tablet    losartan tablet 100 mg    magnesium oxide tablet 800 mg    magnesium oxide tablet 800 mg    melatonin tablet 6 mg    metoprolol tartrate (LOPRESSOR) tablet 50 mg    morphine injection 1 mg    naloxone 0.4 mg/mL injection 0.02 mg    ondansetron injection 4 mg    polyethylene glycol packet 17 g    potassium bicarbonate disintegrating tablet 35 mEq    potassium bicarbonate disintegrating tablet 50 mEq    potassium bicarbonate disintegrating tablet 60 mEq    potassium, sodium phosphates 280-160-250 mg packet 2 packet    potassium, sodium phosphates 280-160-250 mg packet 2 packet    potassium, sodium phosphates 280-160-250 mg packet 2 packet    senna-docusate 8.6-50 mg per tablet 1 tablet    simethicone chewable tablet 160 mg    sodium chloride 0.9% flush 2 mL     Objective:     Vital Signs (Most Recent):  Temp: 96.8 °F (36 °C) (02/28/24 0318)  Pulse: 80 (02/28/24 0318)  Resp: 16 (02/28/24 0318)  BP: (!) 143/70 (02/28/24 0318)  SpO2: (!) 94 % (02/28/24 0318) Vital Signs (24h Range):  Temp:  [96.8 °F (36 °C)-98.5 °F (36.9 °C)] 96.8 °F (36 °C)  Pulse:  [65-80] 80  Resp:  [16-20] 16  SpO2:  [93 %-96 %] 94 %  BP: (123-151)/(60-76)  "143/70     Weight: 106 kg (233 lb 11 oz)  Height: 5' 1" (154.9 cm)  Body mass index is 44.15 kg/m².      Intake/Output Summary (Last 24 hours) at 2/28/2024 0703  Last data filed at 2/28/2024 0513  Gross per 24 hour   Intake 600 ml   Output 1200 ml   Net -600 ml        General    Nursing note and vitals reviewed.  Constitutional: She is oriented to person, place, and time. She appears well-developed and well-nourished.   Pulmonary/Chest: Effort normal.   Neurological: She is alert and oriented to person, place, and time.   Psychiatric: She has a normal mood and affect. Her behavior is normal.         Right Shoulder Exam     Comments:  RUE DNVI. Post-op dressing C/D/I.       Significant Labs: CBC:   Recent Labs   Lab 02/27/24  0439 02/28/24  0434   WBC 8.13 6.78   HGB 9.4* 9.5*   HCT 29.3* 29.9*    242     CMP:   Recent Labs   Lab 02/27/24  0439 02/28/24  0431    135*   K 4.4 4.0    104   CO2 24 24    108   BUN 19 16   CREATININE 0.7 0.6   CALCIUM 10.1 10.3   PROT 6.3 6.6   ALBUMIN 2.7* 2.9*   BILITOT 0.7 0.9   ALKPHOS 69 88   AST 17 30   ALT 14 24   ANIONGAP 6* 7*     All pertinent labs within the past 24 hours have been reviewed.    Significant Imaging: None  "

## 2024-02-28 NOTE — NURSING
Discharge instructions provided. Patient verbalized understanding. Iv removed. Tele box removed.. dressing changed to r shoulder per MD request. Patient leaving shortly with son.

## 2024-02-28 NOTE — PLAN OF CARE
02/27/24 1911   Patient Assessment/Suction   Level of Consciousness (AVPU) alert   Respiratory Effort Normal;Unlabored   Expansion/Accessory Muscles/Retractions expansion symmetric   All Lung Fields Breath Sounds diminished   Rhythm/Pattern, Respiratory pattern regular   Cough Frequency infrequent   Cough Type nonproductive;loose   PRE-TX-O2   Device (Oxygen Therapy) room air   SpO2 95 %   Pulse Oximetry Type Intermittent   Incentive Spirometer   $ Incentive Spirometer Charges done with encouragement   Administration (IS) mouthpiece utilized   Number of Repetitions (IS) 10   Level Incentive Spirometer (mL) 1500   Patient Tolerance (IS) good;no adverse signs/symptoms present   Vibratory PEP Therapy   $ Vibratory PEP Charges Aerobika Therapy   $ Vibratory PEP Tech Time Charges 15 min   Type (PEP Therapy) vibratory/oscillatory   Device (PEP Therapy) flutter   Route (PEP Therapy) mouthpiece   Breaths per Cycle (PEP Therapy) 10   Cycles (PEP Therapy) 1   Patient Position (PEP Therapy) HOB elevated   Post Treatment Assessment (PEP) breath sounds unchanged   Signs of Intolerance (PEP Therapy) none

## 2024-02-28 NOTE — PLAN OF CARE
Javy walker approved and delivered to bedside from [employer].     Auth still pending for WC and bsc. Luz Elena from [employer] will have delivered to home address once approved. States can take up to 14 days. BSC is $45.    CM updated pt of above- I explained that she can pay for bsc if she wants so she can have immediately. States she will call when she gets home to arrange payment and delivery       02/28/24 1138   Post-Acute Status   Post-Acute Authorization HME   E Status Set-up Complete/Auth obtained

## 2024-02-28 NOTE — DISCHARGE SUMMARY
[facility] Medicine  Discharge Summary      Patient Name: Paola Ibanez  MRN: [id number]  Patient Class: IP- Inpatient  Admission Date: 2/20/2024  Hospital Length of Stay: 8 days  Discharge Date and Time: 2/28/2024  4:49 PM  Attending Physician: No att. providers found   Discharging Provider: Nadira Carter MD  Primary Care Provider: May Rangel MD      HPI:   This is a 65 y/o  female w/ pmh of a-fib, HTN who presents w/ right arm pain. Patient reports she was in her kitchen today and her floors are uneven and she stumbled and hit her arm on the cabinet; reports having had 9/10 pain but currently reports she's comfortable; denies headache, blurry vision, chest pain, SOB, palpitations, abdominal pain, N/V, dysuria and back pain. Patient is afebrile, tachycardic, hypertensive and saturating ok. Lab work reflects hyponatremia w/ Na of 131. Right humerus x-ray showed a pathologic fracture through the surgical neck of the humerus with cystic component in the proximal humeral diaphysis. Recommend supplement evaluation with CT scan of the upper extremity once the initial traumatic episode has resolved. A right shoulder x-ray showed a pathologic fracture through the surgical neck of humerus with involvement of greater tubercle. Patient received Lopressor 25 mg PO x1 in the ER and her a-fib w/ RVR has converted to sinus rhythm. Patient reports she was on Eliquis before but it caused her to have bloody BM and she hasn't been on it in years.     Procedure(s) (LRB):  INSERTION, INTRAMEDULLARY ROBIN (Right)      Hospital Course:   Paola Ibanez is a 64 year old female with a past medical history of obesity, HTN, anemia and Afib who presented with Afib with RVR with a R humerus fracture. The Afib is rate controlled on metoprolol. Cardiology has been consulted. Orthopedic Surgery was also consulted and plans to take patient to the OR 2/26. PT/OT has been consulted.  IM  nailing completed to right humerus fracture on 2/26.  PT/OT recommended moderate intensity therapy likely at SNF but patient requested home health.  She will f/u with Ortho.  Home health and HME ordered.      Goals of Care Treatment Preferences:  Code Status: Full Code      Consults:   Consults (From admission, onward)          Status Ordering Provider     Inpatient virtual consult to Hospital Medicine  Once        Provider:  (Not yet assigned)    Completed SELMA DARLING     Inpatient consult to Cardiology  Once        Provider:  Parker Juárez MD    Completed BLAKE PAPPAS     Inpatient consult to   Once        Provider:  (Not yet assigned)    Completed BLANKA EWING     Inpatient consult to   Once        Provider:  (Not yet assigned)    Completed BLAKE PAPPAS            Cardiac/Vascular  Hypertension  -Continue home hydralazine, HCTZ, metoprolol and losartan  -Continue to monitor    Atrial fibrillation with RVR  RVR resolved.  -Metoprolol BID  -Hold anticoagulation  -Telemetry  -Cardiology following      Oncology  B12 deficiency anemia  Stable.  -Trend Hgb with CBC  -B12 low and folate nl  -start B12 repletion with PCP f/u and monitoring    Endocrine  Morbid obesity with body mass index (BMI) of 40.0 or higher  Body mass index is 44.15 kg/m². Morbid obesity complicates all aspects of disease management from diagnostic modalities to treatment.        Orthopedic  * Closed fracture of surgical neck of humerus  -Orthopedic Surgery on 2/26 - s/p IM nailing  -PRN analgesics  -PT/OT recommend moderate intensity therapy - pt decided home with home health  -RUE in sling        Final Active Diagnoses:    Diagnosis Date Noted POA    PRINCIPAL PROBLEM:  Closed fracture of surgical neck of humerus [S42.213A] 02/20/2024 Yes    B12 deficiency anemia [D51.9] 02/24/2024 Yes    Morbid obesity with body mass index (BMI) of 40.0 or higher [E66.01] 04/26/2021 Yes    Atrial fibrillation with RVR  [I48.91] 04/28/2016 Yes    Hypertension [I10] 04/28/2016 Yes      Problems Resolved During this Admission:    Diagnosis Date Noted Date Resolved POA    Hyponatremia [E87.1] 02/20/2024 02/24/2024 Yes       Discharged Condition: stable    [place] [facility] [place]:   Follow-up Information       Chirag Brown MD. Go on 3/21/2024.    Specialties: Hematology, Hematology and Oncology, Oncology  Why: at 1:15 PM for hospital follow up  Contact information:  [address]  [address]  [place] [address]  [phone]               May Rangel MD. Go on 3/5/2024.    Specialties: Hospitalist, Internal Medicine  Why: 1:30 PM for hospital follow up  Contact information:  [address]  [address]  [place] [address]  [phone]               Luis Gonzalez MD Follow up.    Specialties: Orthopedic Surgery, Surgery, Sports Medicine  Contact information:  [address]  [address]  [place] [address]  [phone]               [facility] Follow up.    Specialties: Home Health Services, Home Therapy Services, Home Living Aide Services  Why: home health will see you tomorrow  Contact information:  [address] [place] [address]  [phone]             [facility] Follow up.    Why: where your walker is from  your  and commode order is here as well. if you want to pay for commode instead of waiting for insurance- call them to arrange payment and delivery  Contact information:  [address]  [place] [address]  [phone]             Betsy Enciso MD. Go on 3/4/2024.    Specialties: Interventional Cardiology, Cardiology, Cardiovascular Disease  Why: 11:45 AM for hospital follow up  Contact information:  [address]  [address]  [place] [address]  [phone]                           Patient Instructions:      WALKER FOR HOME USE     Order Specific Question Answer Comments   Type of Walker: Javy    With wheels? Yes    Height:  "5' 1" (1.549 m)    Weight: 106 kg (233 lb 11 oz)    Length of need (1-99 months): 12    Does patient have medical equipment at home? none    Please check all that apply: Patient's condition impairs ambulation.      COMMODE FOR HOME USE     Order Specific Question Answer Comments   Type: Standard    Height: 5' 1" (1.549 m)    Weight: 106 kg (233 lb 11 oz)    Does patient have medical equipment at home? none    Length of need (1-99 months): 12      WHEELCHAIR FOR HOME USE     Order Specific Question Answer Comments   Hours in W/C per day: 10    Type of Wheelchair: Standard    Size(Width): 18"(STD adult)    Leg Support: STD footrests    Lap Belt: Velcro    Accessories: Anti-tippers    Cushion: Basic    Reclining Back No    Height: 5' 1" (1.549 m)    Weight: 106 kg (233 lb 11 oz)    Does patient have medical equipment at home? none    Length of need (1-99 months): 12    Please check all that apply: Caregiver is capable and willing to operate wheelchair safely.      NM Bone Scan Whole Body   Standing Status: Future Standing Exp. Date: 02/21/25     Order Specific Question Answer Comments   May the Radiologist modify the order per protocol to meet the clinical needs of the patient? Yes      Ambulatory referral/consult to Cardiology   Standing Status: Future   Referral Priority: Routine Referral Type: Consultation   Referral Reason: Specialty Services Required   Requested Specialty: Cardiology   Number of Visits Requested: 1     Diet Cardiac     Diet Cardiac     Lifting restrictions   Order Comments: No lifting with right upper extremity   Keep in sling     Notify your health care provider if you experience any of the following:  temperature >100.4     Notify your health care provider if you experience any of the following:  persistent nausea and vomiting or diarrhea     Notify your health care provider if you experience any of the following:  severe uncontrolled pain     Notify your health care provider if you experience " any of the following:  redness, tenderness, or signs of infection (pain, swelling, redness, odor or green/yellow discharge around incision site)     Other restrictions (specify):   Order Comments: Keep sling in place at all times.     Notify your health care provider if you experience any of the following:  temperature >100.4     Notify your health care provider if you experience any of the following:  persistent nausea and vomiting or diarrhea     Notify your health care provider if you experience any of the following:  severe uncontrolled pain     Notify your health care provider if you experience any of the following:  redness, tenderness, or signs of infection (pain, swelling, redness, odor or green/yellow discharge around incision site)     Notify your health care provider if you experience any of the following:  difficulty breathing or increased cough     Notify your health care provider if you experience any of the following:  severe persistent headache     Notify your health care provider if you experience any of the following:  worsening rash     Notify your health care provider if you experience any of the following:  persistent dizziness, light-headedness, or visual disturbances     Notify your health care provider if you experience any of the following:  increased confusion or weakness     Leave dressing on - Keep it clean, dry, and intact until clinic visit     Weight bearing restrictions (specify):   Order Comments: Non-weightbearing to Right arm       Significant Diagnostic Studies: as above    Pending Diagnostic Studies:       Procedure Component Value Units Date/Time    Beta 2 Microglobulin, Serum [[id number]] Collected: 02/21/24 1515    Order Status: Sent Lab Status: In process Updated: 02/21/24 1530    Specimen: Blood     Kappa/Lambda Light Chains, Free with Ratio, Urine [[id number]] Collected: 02/21/24 1517    Order Status: Sent Lab Status: No result     Specimen: Urine             Medications:  Reconciled Home Medications:      Medication List        START taking these medications      cyanocobalamin (vitamin B-12) 2,000 mcg Tab  Take 2,000 mcg by mouth once daily.  Start taking on: February 29, 2024     HYDROcodone-acetaminophen 5-325 mg per tablet  Commonly known as: NORCO  Take 1 tablet by mouth every 6 (six) hours as needed for Pain.     senna-docusate 8.6-50 mg 8.6-50 mg per tablet  Commonly known as: PERICOLACE  Take 1 tablet by mouth 2 (two) times a day.            CHANGE how you take these medications      metoprolol tartrate 50 MG tablet  Commonly known as: LOPRESSOR  Take 1 tablet (50 mg total) by mouth 2 (two) times daily.  What changed: how much to take            CONTINUE taking these medications      acetaminophen 650 MG Tbsr  Commonly known as: TYLENOL  Take 1 tablet (650 mg total) by mouth every 8 (eight) hours.     cloNIDine 0.1 MG tablet  Commonly known as: CATAPRES  Take 0.1 mg by mouth every 8 (eight) hours as needed.     fluticasone propionate 50 mcg/actuation nasal spray  Commonly known as: FLONASE  1 spray by Nasal route once daily.     hydrALAZINE 25 MG tablet  Commonly known as: APRESOLINE  Take 25 mg by mouth every 8 (eight) hours.     hydroCHLOROthiazide 25 MG tablet  Commonly known as: HYDRODIURIL  Take 25 mg by mouth every morning.     losartan 100 MG tablet  Commonly known as: COZAAR  Take 1 tablet (100 mg total) by mouth once daily.     potassium chloride SA 20 MEQ tablet  Commonly known as: K-DUR,KLOR-CON  Take 20 mEq by mouth daily as needed.            ASK your doctor about these medications      albuterol 90 mcg/actuation inhaler  Commonly known as: PROVENTIL/VENTOLIN HFA  Inhale 1-2 puffs into the lungs every 6 (six) hours as needed for Wheezing. Rescue              Indwelling Lines/Drains at time of discharge:   Lines/Drains/Airways       Drain  Duration             Female External Urinary Catheter w/ Suction 02/22/24 1411 6 days                    Time  spent on the discharge of patient: 45 minutes         The attending portion of this evaluation, treatment, and documentation was performed per Nadira Carter MD via Telemedicine AudioVisual using the secure [employer] software platform with 2 way audio/video. The provider was located off-site and the patient is located in the hospital. The aforementioned video software was utilized to document the relevant history and physical exam    Nadira Carter MD  Department of Hospital Medicine  [facility]/Surg

## 2024-02-28 NOTE — PLAN OF CARE
Pt clear for DC from case management standpoint. Discharging to home with [facility] (SOC 2/29)       02/28/24 1311   Final Note   Assessment Type Final Discharge Note   Anticipated Discharge Disposition Home-Health   Post-Acute Status   Post-Acute Authorization Home Health   Home Health Status Set-up Complete/Auth obtained

## 2024-02-28 NOTE — CARE UPDATE
02/28/24 1338   Patient Assessment/Suction   Level of Consciousness (AVPU) alert   Respiratory Effort Normal;Unlabored   Expansion/Accessory Muscles/Retractions no use of accessory muscles;no retractions;expansion symmetric   All Lung Fields Breath Sounds diminished   Rhythm/Pattern, Respiratory unlabored;pattern regular;depth regular;no shortness of breath reported   Cough Frequency no cough   PRE-TX-O2   Device (Oxygen Therapy) room air   SpO2 95 %   Pulse Oximetry Type Intermittent   $ Pulse Oximetry - Single Charge Pulse Oximetry - Single   Pulse 71   Resp 18   Positioning Sitting in chair   Incentive Spirometer   $ Incentive Spirometer Charges done with encouragement   Incentive Spirometer Predicted Level (mL) 1400   Administration (IS) mouthpiece utilized   Number of Repetitions (IS) 10   Level Incentive Spirometer (mL) 1000   Patient Tolerance (IS) good   Vibratory PEP Therapy   $ Vibratory PEP Charges Aerobika Therapy   $ Vibratory PEP Equipment Aerobika Equipment   $ Vibratory PEP Tech Time Charges 15 min   Daily Review of Necessity (PEP Therapy) completed   Type (PEP Therapy) vibratory/oscillatory   Device (PEP Therapy) flutter   Route (PEP Therapy) mouthpiece   Breaths per Cycle (PEP Therapy) 10   Cycles (PEP Therapy) 1   Settings (PEP Therapy) PEP 5   Patient Position (PEP Therapy) sitting in chair   Signs of Intolerance (PEP Therapy) none   Education   $ Education 15 min

## 2024-02-28 NOTE — ASSESSMENT & PLAN NOTE
-Orthopedic Surgery on 2/26 - s/p IM nailing  -PRN analgesics  -PT/OT recommend moderate intensity therapy - pt decided home with home health  -RUE in sling

## 2024-02-28 NOTE — PLAN OF CARE
Problem: Physical Therapy  Goal: Physical Therapy Goal  Description: 1. Supine to sit with Stand-by Assistance  2. Sit to stand transfer with Stand-by Assistance  3.. Bed to chair transfer with Supervision using Javy Walker  4. Gait  x 50 feet with Minimal Assistance using Javy Walker.     Outcome: Ongoing, Progressing   Ambulate with HW assistance for safety.

## 2024-02-28 NOTE — PROGRESS NOTES
[facility]/Surg  Orthopedics  Progress Note    Patient Name: Paola Ibanez  MRN: [id number]  Admission Date: 2/20/2024  Hospital Length of Stay: 8 days  Attending Provider: Nadira Carter MD  Primary Care Provider: May Rangel MD  Follow-up For: Procedure(s) (LRB):  INSERTION, INTRAMEDULLARY ROBIN (Right)    Post-Operative Day: 2 Days Post-Op  Subjective:     Principal Problem:Closed fracture of surgical neck of humerus    Principal Orthopedic Problem: S/P R humerus IM nail    Interval History: plans to go home    Review of patient's allergies indicates:   Allergen Reactions    Aspirin Nausea Only     Stomach Pain  Stomach Pain      Lisinopril Other (See Comments)     Cough  Cough      Penicillin g potassium Rash     Rash  Rash         Current Facility-Administered Medications   Medication    acetaminophen tablet 650 mg    aluminum-magnesium hydroxide-simethicone 200-200-20 mg/5 mL suspension 30 mL    bisacodyL suppository 10 mg    cyanocobalamin tablet 2,000 mcg    dextrose 10% bolus 250 mL 250 mL    enoxaparin injection 40 mg    hydrALAZINE tablet 25 mg    hydroCHLOROthiazide tablet 25 mg    HYDROcodone-acetaminophen 5-325 mg per tablet 1 tablet    losartan tablet 100 mg    magnesium oxide tablet 800 mg    magnesium oxide tablet 800 mg    melatonin tablet 6 mg    metoprolol tartrate (LOPRESSOR) tablet 50 mg    morphine injection 1 mg    naloxone 0.4 mg/mL injection 0.02 mg    ondansetron injection 4 mg    polyethylene glycol packet 17 g    potassium bicarbonate disintegrating tablet 35 mEq    potassium bicarbonate disintegrating tablet 50 mEq    potassium bicarbonate disintegrating tablet 60 mEq    potassium, sodium phosphates 280-160-250 mg packet 2 packet    potassium, sodium phosphates 280-160-250 mg packet 2 packet    potassium, sodium phosphates 280-160-250 mg packet 2 packet    senna-docusate 8.6-50 mg per tablet 1 tablet    simethicone chewable tablet 160 mg    sodium chloride  "0.9% flush 2 mL     Objective:     Vital Signs (Most Recent):  Temp: 96.8 °F (36 °C) (02/28/24 0318)  Pulse: 80 (02/28/24 0318)  Resp: 16 (02/28/24 0318)  BP: (!) 143/70 (02/28/24 0318)  SpO2: (!) 94 % (02/28/24 0318) Vital Signs (24h Range):  Temp:  [96.8 °F (36 °C)-98.5 °F (36.9 °C)] 96.8 °F (36 °C)  Pulse:  [65-80] 80  Resp:  [16-20] 16  SpO2:  [93 %-96 %] 94 %  BP: (123-151)/(60-76) 143/70     Weight: 106 kg (233 lb 11 oz)  Height: 5' 1" (154.9 cm)  Body mass index is 44.15 kg/m².      Intake/Output Summary (Last 24 hours) at 2/28/2024 0703  Last data filed at 2/28/2024 0513  Gross per 24 hour   Intake 600 ml   Output 1200 ml   Net -600 ml        General    Nursing note and vitals reviewed.  Constitutional: She is oriented to person, place, and time. She appears well-developed and well-nourished.   Pulmonary/Chest: Effort normal.   Neurological: She is alert and oriented to person, place, and time.   Psychiatric: She has a normal mood and affect. Her behavior is normal.         Right Shoulder Exam     Comments:  RUE DNVI. Post-op dressing C/D/I.       Significant Labs: CBC:   Recent Labs   Lab 02/27/24 0439 02/28/24 0434   WBC 8.13 6.78   HGB 9.4* 9.5*   HCT 29.3* 29.9*    242     CMP:   Recent Labs   Lab 02/27/24 0439 02/28/24 0431    135*   K 4.4 4.0    104   CO2 24 24    108   BUN 19 16   CREATININE 0.7 0.6   CALCIUM 10.1 10.3   PROT 6.3 6.6   ALBUMIN 2.7* 2.9*   BILITOT 0.7 0.9   ALKPHOS 69 88   AST 17 30   ALT 14 24   ANIONGAP 6* 7*     All pertinent labs within the past 24 hours have been reviewed.    Significant Imaging: None  Assessment/Plan:     * Closed fracture of surgical neck of humerus  RUE in sling at all time  RUE NWB  PT for mobility and LE endurance  OT for ADLs  Patient has refused SNF consult because she is concerned about COVID - ok to DC home with HH  Change dressing this am  DME for home per PT          RAYMUNDO PECK  Orthopedics  [facility] - Med/Surg

## 2024-02-28 NOTE — PT/OT/SLP PROGRESS
Physical Therapy Treatment    Patient Name:  Paola Ibanez   MRN:  [id number]    Recommendations:     Discharge Recommendations: High Intensity Therapy  Discharge Equipment Recommendations: bath bench, bedside commode, walker, jerad (would benefit from AFO on R foot)  Barriers to discharge: None    Assessment:     Paola Ibanez is a 64 y.o. female admitted with a medical diagnosis of Closed fracture of surgical neck of humerus.  She presents with the following impairments/functional limitations: weakness, impaired endurance, impaired self care skills, impaired functional mobility, gait instability, decreased upper extremity function, pain, orthopedic precautions . Seated on commode in restroom.  Attempted hygiene post BM but required A to complete in standing. Sling adjusted.  Sit > stand with HW and CGA.  Ambulated 40' with HW and Min A for safety, slowly, with chair close follow.  HW for home use adjusted and labeled.     Rehab Prognosis: Fair; patient would benefit from acute skilled PT services to address these deficits and reach maximum level of function.    Recent Surgery: Procedure(s) (LRB):  INSERTION, INTRAMEDULLARY ROBIN (Right) 2 Days Post-Op    Plan:     During this hospitalization, patient to be seen daily to address the identified rehab impairments via gait training, therapeutic activities, therapeutic exercises and progress toward the following goals:    Plan of Care Expires:  03/22/24    Subjective     Chief Complaint: none stated  Patient/Family Comments/goals: to return home with family to assist.  Pain/Comfort:  Pain Rating 1: other (see comments) (did not rate)  Location - Side 1: Right  Location - Orientation 1: generalized  Location 1: shoulder  Pain Addressed 1: Reposition, Nurse notified      Objective:     Communicated with nurse Magaña prior to session.  Patient found  seated on commode  with telemetry upon PT entry to room.     General Precautions: Standard, fall  Orthopedic Precautions:  RUE non weight bearing  Braces: UE Sling  Respiratory Status: Room air     Functional Mobility:  Transfers:     Sit to Stand:  contact guard assistance with hemiwalker  Gait: 40' with HW and Min A.      AM-PAC 6 CLICK MOBILITY          Treatment & Education:  Off commode with CGA using handrail.  Ambulated with HW and Min A, NWB RUE.    Patient left up in chair with all lines intact, call button in reach, chair alarm on, and nurse Archana notified..    GOALS:   Multidisciplinary Problems       Physical Therapy Goals          Problem: Physical Therapy    Goal Priority Disciplines Outcome Goal Variances Interventions   Physical Therapy Goal     PT, PT/OT Ongoing, Progressing     Description: 1. Supine to sit with Stand-by Assistance  2. Sit to stand transfer with Stand-by Assistance  3.. Bed to chair transfer with Supervision using Javy Walker  4. Gait  x 50 feet with Minimal Assistance using Javy Walker.                          Time Tracking:     PT Received On: 02/28/24  PT Start Time: 1015     PT Stop Time: 1038  PT Total Time (min): 23 min     Billable Minutes: Gait Training 10min and Therapeutic Activity 13min    Treatment Type: Treatment  PT/PTA: PTA     Number of PTA visits since last PT visit: 1 02/28/2024

## 2024-02-28 NOTE — PLAN OF CARE
[facility]      HOME HEALTH ORDERS  FACE TO FACE ENCOUNTER    Patient Name: Paola Ibanez  YOB: 1960    PCP: May Rangel MD   PCP Address: [address] / [place] [address]  PCP Phone Number: [phone]  PCP Fax: [phone]    Encounter Date: 2/20/24    Admit to Home Health    Diagnoses:  Active Hospital Problems    Diagnosis  POA    *Closed fracture of surgical neck of humerus [S42.213A]  Yes    B12 deficiency anemia [D51.9]  Yes    Morbid obesity with body mass index (BMI) of 40.0 or higher [E66.01]  Yes     Last Assessment & Plan:    Formatting of this note might be different from the original.   Discussed with future visits in detail      Atrial fibrillation with RVR [I48.91]  Yes    Hypertension [I10]  Yes      Resolved Hospital Problems    Diagnosis Date Resolved POA    Hyponatremia [E87.1] 02/24/2024 Yes       Follow Up Appointments:  Future Appointments   Date Time Provider Department Center   3/21/2024  1:15 PM Chirag Brown MD [facility] HEM ONC [facility]       Allergies:  Review of patient's allergies indicates:   Allergen Reactions    Aspirin Nausea Only     Stomach Pain  Stomach Pain      Lisinopril Other (See Comments)     Cough  Cough      Penicillin g potassium Rash     Rash  Rash         Medications: Review discharge medications with patient and family and provide education.    Current Facility-Administered Medications   Medication Dose Route Frequency Provider Last Rate Last Admin    acetaminophen tablet 650 mg  650 mg Oral Q4H PRN Luis Gonzalez MD   650 mg at 02/28/24 0915    aluminum-magnesium hydroxide-simethicone 200-200-20 mg/5 mL suspension 30 mL  30 mL Oral QID PRN Luis Gonzalez MD        bisacodyL suppository 10 mg  10 mg Rectal Daily PRN Eloisa Desir FNP   10 mg at 02/28/24 0609    cyanocobalamin tablet 2,000 mcg  2,000 mcg Oral Daily Nadira Carter MD   2,000 mcg at 02/28/24 0915    dextrose 10% bolus 250 mL  250 mL  25 g Intravenous PRN Luis Gonzalez MD        enoxaparin injection 40 mg  40 mg Subcutaneous Q12H (prophylaxis, 0900/2100) Luis Gonzalez MD   40 mg at 02/28/24 0917    hydrALAZINE tablet 25 mg  25 mg Oral Q8H Luis Gonzalez MD   25 mg at 02/28/24 0649    hydroCHLOROthiazide tablet 25 mg  25 mg Oral QAM Luis Gonzalez MD   25 mg at 02/28/24 0915    HYDROcodone-acetaminophen 5-325 mg per tablet 1 tablet  1 tablet Oral Q4H PRN Luis Gonzalez MD   1 tablet at 02/27/24 1834    losartan tablet 100 mg  100 mg Oral Daily Luis Gonzalez MD   100 mg at 02/28/24 0915    magnesium oxide tablet 800 mg  800 mg Oral PRN Luis Gonzalez MD        magnesium oxide tablet 800 mg  800 mg Oral PRN Luis Gonzalez MD        melatonin tablet 6 mg  6 mg Oral Nightly PRN Luis Gonzalez MD        metoprolol tartrate (LOPRESSOR) tablet 50 mg  50 mg Oral BID Luis Gonzalez MD   50 mg at 02/28/24 0915    morphine injection 1 mg  1 mg Intravenous Q6H PRN Luis Gonzalez MD   1 mg at 02/27/24 1630    naloxone 0.4 mg/mL injection 0.02 mg  0.02 mg Intravenous PRN Luis Gonzalez MD        ondansetron injection 4 mg  4 mg Intravenous Q6H PRN Luis Gonzalez MD   4 mg at 02/21/24 1026    polyethylene glycol packet 17 g  17 g Oral Daily PRN Luis Gonzalez MD   17 g at 02/27/24 2136    potassium bicarbonate disintegrating tablet 35 mEq  35 mEq Oral Luis Sutherland MD        potassium bicarbonate disintegrating tablet 50 mEq  50 mEq Oral PRN Luis Gonzalez MD   50 mEq at 02/24/24 0606    potassium bicarbonate disintegrating tablet 60 mEq  60 mEq Oral Luis Sutherland MD        potassium, sodium phosphates 280-160-250 mg packet 2 packet  2 packet Oral PRN Luis Gonzalez MD        potassium, sodium phosphates 280-160-250 mg packet 2 packet  2 packet Oral PRN Luis Gonzalez MD        potassium, sodium phosphates 280-160-250 mg packet 2 packet  2 packet Oral PRN Luis Gonzalez MD        senna-docusate 8.6-50 mg per tablet 1 tablet  1 tablet Oral  Daily Luis Gonzalez MD   1 tablet at 02/28/24 0915    simethicone chewable tablet 160 mg  2 tablet Oral QID PRN Nadira Carter MD   160 mg at 02/28/24 0312    sodium chloride 0.9% flush 2 mL  2 mL Intravenous Q12H PRN Luis Gonzalez MD         Current Discharge Medication List        START taking these medications    Details   cyanocobalamin 2,000 mcg Tab Take 2,000 mcg by mouth once daily.  Qty: 30 tablet, Refills: 5      HYDROcodone-acetaminophen (NORCO) 5-325 mg per tablet Take 1 tablet by mouth every 6 (six) hours as needed for Pain.  Qty: 30 tablet, Refills: 0    Comments: Quantity prescribed more than 7 day supply? Yes, quantity medically necessary      senna-docusate 8.6-50 mg (PERICOLACE) 8.6-50 mg per tablet Take 1 tablet by mouth 2 (two) times a day.  Qty: 60 tablet, Refills: 1           CONTINUE these medications which have CHANGED    Details   metoprolol tartrate (LOPRESSOR) 50 MG tablet Take 1 tablet (50 mg total) by mouth 2 (two) times daily.  Qty: 60 tablet, Refills: 3    Comments: .           CONTINUE these medications which have NOT CHANGED    Details   acetaminophen (TYLENOL) 650 MG TbSR Take 1 tablet (650 mg total) by mouth every 8 (eight) hours.  Qty: 30 tablet, Refills: 0      cloNIDine (CATAPRES) 0.1 MG tablet Take 0.1 mg by mouth every 8 (eight) hours as needed.      fluticasone propionate (FLONASE) 50 mcg/actuation nasal spray 1 spray by Nasal route once daily.      hydroCHLOROthiazide (HYDRODIURIL) 25 MG tablet Take 25 mg by mouth every morning.      losartan (COZAAR) 100 MG tablet Take 1 tablet (100 mg total) by mouth once daily.  Qty: 30 tablet, Refills: 5    Comments: .  Associated Diagnoses: Essential hypertension      potassium chloride SA (K-DUR,KLOR-CON) 20 MEQ tablet Take 20 mEq by mouth daily as needed.      albuterol (PROVENTIL/VENTOLIN HFA) 90 mcg/actuation inhaler Inhale 1-2 puffs into the lungs every 6 (six) hours as needed for Wheezing. Rescue  Qty: 18 g, Refills: 0       hydrALAZINE (APRESOLINE) 25 MG tablet Take 25 mg by mouth every 8 (eight) hours.               I have seen and examined this patient within the last 30 days. My clinical findings that support the need for the home health skilled services and home bound status are the following:no   Weakness/numbness causing balance and gait disturbance due to Fracture and Surgery making it taxing to leave home.  Requiring assistive device to leave home due to unsteady gait caused by  Fracture and Surgery.     Diet:   cardiac diet      Referrals/ Consults  Physical Therapy to evaluate and treat. Evaluate for home safety and equipment needs; Establish/upgrade home exercise program. Perform / instruct on therapeutic exercises, gait training, transfer training, and Range of Motion.  Occupational Therapy to evaluate and treat. Evaluate home environment for safety and equipment needs. Perform/Instruct on transfers, ADL training, ROM, and therapeutic exercises.  Aide to provide assistance with personal care, ADLs, and vital signs.    Activities:   other non-weight bearing to RUE    Nursing:   Agency to admit patient within 24 hours of hospital discharge unless specified on physician order or at patient request    SN to complete comprehensive assessment including routine vital signs. Instruct on disease process and s/s of complications to report to MD. Review/verify medication list sent home with the patient at time of discharge  and instruct patient/caregiver as needed. Frequency may be adjusted depending on start of care date.     Skilled nurse to perform up to 3 visits PRN for symptoms related to diagnosis    Notify MD if SBP > 160 or < 90; DBP > 90 or < 50; HR > 120 or < 50; Temp > 101; O2 < 88%  Ok to schedule additional visits based on staff availability and patient request on consecutive days within the home health episode.    When multiple disciplines ordered:    Start of Care occurs on Sunday - Wednesday schedule remaining  discipline evaluations as ordered on separate consecutive days following the start of care.    Thursday SOC -schedule subsequent evaluations Friday and Monday the following week.     Friday - Saturday SOC - schedule subsequent discipline evaluations on consecutive days starting Monday of the following week.    For all post-discharge communication and subsequent orders please contact patient's primary care physician. If unable to reach primary care physician or do not receive response within 30 minutes, please contact [phone] for clinical staff order clarification    Miscellaneous   Routine Skin for Bedridden Patients: Instruct patient/caregiver to apply moisture barrier cream to all skin folds and wet areas in perineal area daily and after baths and all bowel movements.    Home Health Aide:  Nursing Three times weekly, Physical Therapy Three times weekly, Occupational Therapy Three times weekly, and Home Health Aide Three times weekly    Wound Care Orders  Keep dressing in place to right UE until clinic followup.    I certify that this patient is confined to her home and needs intermittent skilled nursing care, physical therapy, and occupational therapy.    Future Appointments   Date Time Provider Department Center   3/11/2024  9:30 AM Guevara Santillan PA-C [facility]   3/21/2024  1:15 PM Chirag Brown MD [facility] HEM ONC [facility] Carter MD

## 2024-02-28 NOTE — ASSESSMENT & PLAN NOTE
RAFE in sling at all time  RUE NWB  PT for mobility and LE endurance  OT for ADLs  Patient has refused SNF consult because she is concerned about COVID - ok to DC home with HH  Change dressing this am  DME for home per PT

## 2024-02-29 ENCOUNTER — PATIENT OUTREACH (OUTPATIENT)
Dept: ADMINISTRATIVE | Facility: CLINIC | Age: 64
End: 2024-02-29
Payer: COMMERCIAL

## 2024-02-29 PROCEDURE — G0180 MD CERTIFICATION HHA PATIENT: HCPCS | Mod: ,,, | Performed by: PHYSICIAN ASSISTANT

## 2024-02-29 NOTE — PROGRESS NOTES
C3 nurse spoke with Paola Ibanez  for a TCC post hospital discharge follow up call. The patient has a scheduled [facility] appointment with May Rangel MD  on 3/5/24 @ 9408.

## 2024-03-04 LAB
OHS QRS DURATION: 134 MS
OHS QRS DURATION: 136 MS
OHS QTC CALCULATION: 450 MS
OHS QTC CALCULATION: 488 MS

## 2024-03-05 LAB
KAPPA LC UR-MCNC: 118.74 MG/L (ref 1.17–86.46)
KAPPA LC/LAMBDA UR: ABNORMAL {RATIO}
LAMBDA LC UR-MCNC: 8.55 MG/L (ref 0.27–15.21)

## 2024-03-07 LAB
OHS QRS DURATION: 118 MS
OHS QTC CALCULATION: 465 MS

## 2024-03-11 ENCOUNTER — OFFICE VISIT (OUTPATIENT)
Dept: ORTHOPEDICS | Facility: CLINIC | Age: 64
End: 2024-03-11
Payer: COMMERCIAL

## 2024-03-11 VITALS — BODY MASS INDEX: 43.99 KG/M2 | HEIGHT: 61 IN | WEIGHT: 233 LBS

## 2024-03-11 DIAGNOSIS — Z98.890 STATUS POST SHOULDER SURGERY: Primary | ICD-10-CM

## 2024-03-11 PROCEDURE — 99024 POSTOP FOLLOW-UP VISIT: CPT | Mod: S$GLB,,, | Performed by: PHYSICIAN ASSISTANT

## 2024-03-11 PROCEDURE — 3288F FALL RISK ASSESSMENT DOCD: CPT | Mod: CPTII,S$GLB,, | Performed by: PHYSICIAN ASSISTANT

## 2024-03-11 PROCEDURE — 1126F AMNT PAIN NOTED NONE PRSNT: CPT | Mod: CPTII,S$GLB,, | Performed by: PHYSICIAN ASSISTANT

## 2024-03-11 PROCEDURE — 1159F MED LIST DOCD IN RCRD: CPT | Mod: CPTII,S$GLB,, | Performed by: PHYSICIAN ASSISTANT

## 2024-03-11 PROCEDURE — 1100F PTFALLS ASSESS-DOCD GE2>/YR: CPT | Mod: CPTII,S$GLB,, | Performed by: PHYSICIAN ASSISTANT

## 2024-03-11 PROCEDURE — 1160F RVW MEDS BY RX/DR IN RCRD: CPT | Mod: CPTII,S$GLB,, | Performed by: PHYSICIAN ASSISTANT

## 2024-03-11 PROCEDURE — 4010F ACE/ARB THERAPY RXD/TAKEN: CPT | Mod: CPTII,S$GLB,, | Performed by: PHYSICIAN ASSISTANT

## 2024-03-11 RX ORDER — CARVEDILOL 6.25 MG/1
6.25 TABLET ORAL 2 TIMES DAILY
COMMUNITY
Start: 2024-03-07

## 2024-03-11 RX ORDER — FUROSEMIDE 40 MG/1
40 TABLET ORAL DAILY PRN
COMMUNITY
Start: 2024-03-06

## 2024-03-11 RX ORDER — ASPIRIN 81 MG/1
1 TABLET ORAL EVERY MORNING
COMMUNITY

## 2024-03-11 NOTE — PROGRESS NOTES
[facility]  [address]  [place] [address]  Phone: [phone]   [phone]    Patient's PCP:May Rangel MD  Referring Provider: No ref. provider found    POST-OP Note:    Subjective:        Chief Complaint:   Chief Complaint   Patient presents with    Right Shoulder - Post-op Evaluation     S/p Right Humerus IM robin 2/26/24, pain fluctuates, has little bit increased pain with therapy       Past Medical History:   Diagnosis Date    A-fib     Hypertension        Past Surgical History:   Procedure Laterality Date    HYSTERECTOMY      INSERTION OF INTRAMEDULLARY ROBIN Right 2/26/2024    Procedure: INSERTION, INTRAMEDULLARY ROBIN;  Surgeon: Luis Gonzalez MD;  Location: [facility];  Service: Orthopedics;  Laterality: Right;  Ken notified 2/26/24 ark       Current Outpatient Medications   Medication Sig    acetaminophen (TYLENOL) 650 MG TbSR Take 1 tablet (650 mg total) by mouth every 8 (eight) hours. (Patient taking differently: Take 325 mg by mouth every 8 (eight) hours.)    albuterol (PROVENTIL/VENTOLIN HFA) 90 mcg/actuation inhaler Inhale 1-2 puffs into the lungs every 6 (six) hours as needed for Wheezing. Rescue    aspirin (ECOTRIN) 81 MG EC tablet Take 1 tablet by mouth every morning.    carvediloL (COREG) 6.25 MG tablet Take 6.25 mg by mouth 2 (two) times daily.    cloNIDine (CATAPRES) 0.1 MG tablet Take 0.1 mg by mouth every 8 (eight) hours as needed.    cyanocobalamin 2,000 mcg Tab Take 2,000 mcg by mouth once daily.    fluticasone propionate (FLONASE) 50 mcg/actuation nasal spray 1 spray by Nasal route once daily.    furosemide (LASIX) 40 MG tablet Take 40 mg by mouth daily as needed.    hydrALAZINE (APRESOLINE) 25 MG tablet Take 25 mg by mouth every 8 (eight) hours.    hydroCHLOROthiazide (HYDRODIURIL) 25 MG tablet Take 25 mg by mouth every morning.    HYDROcodone-acetaminophen (NORCO) 5-325 mg per tablet Take 1 tablet by mouth every 6 (six) hours as needed for Pain.     losartan (COZAAR) 100 MG tablet Take 1 tablet (100 mg total) by mouth once daily.    metoprolol tartrate (LOPRESSOR) 50 MG tablet Take 1 tablet (50 mg total) by mouth 2 (two) times daily.    potassium chloride SA (K-DUR,KLOR-CON) 20 MEQ tablet Take 20 mEq by mouth daily as needed.    senna-docusate 8.6-50 mg (PERICOLACE) 8.6-50 mg per tablet Take 1 tablet by mouth 2 (two) times a day.     No current facility-administered medications for this visit.       Review of patient's allergies indicates:   Allergen Reactions    Aspirin Nausea Only     Stomach Pain  Stomach Pain      Lisinopril Other (See Comments)     Cough  Cough      Penicillin g potassium Rash     Rash  Rash         Family History   Problem Relation Age of Onset    Seizures Sister     Breast cancer Mother        Social History     Socioeconomic History    Marital status: Single   Tobacco Use    Smoking status: Never    Smokeless tobacco: Never   Substance and Sexual Activity    Alcohol use: Not Currently    Drug use: Never       History of present illness:  Ms. Guevara comes in today 2 weeks status post right humerus open reduction internal fixation.  Comes in today for wound check and suture removal.  She has been working with home health physical therapy.    Review of Systems:    Musculoskeletal:  See HPI       Objective:        Physical Examination:    Vital Signs: There were no vitals filed for this visit.    Body mass index is 44.02 kg/m².    This a well-developed, well nourished patient in no acute distress.  They are alert and oriented and cooperative to examination.        Right shoulder exam: Skin to the right shoulder is clean dry and intact.  Surgical incisions are healing well without wound dehiscence or drainage.  No signs or symptoms of infection.  She is neurovascularly intact throughout the right upper extremity.  I did not range of motion of the right shoulder today.  She can flex and extend at the right elbow, pronate/supinate the right  forearm and flex/extend at the right wrist.  She can open and close right hand into a fist.    Pertinent New Results:     XRAY Report / Interpretation:  Two views taken of the right shoulder today:  AP and Y-view.  They reveal interval reduction of her right proximal humerus fracture with an intramedullary early placed nail with cross fixed screws distally and proximally into the humeral head without evidence of hardware failure.  Fracture is reduced.  No evidence of hardware failure.     Assessment:       1. Status post shoulder surgery      Plan:     Status post shoulder surgery  Comments:  Intramedullary nailing of right proximal humerus fracture  Orders:  -     X-ray Shoulder 2 or More Views Right        Follow up for 4 wk f/u, //XR// s/p Right proximal humerus IM Alejandro 2/26/24.    Sutures removed from the right shoulder today.  She tolerated this well.  She was advised to clean the operative site once a day with warm soapy water and not apply any topical creams or ointments.  Do not submerge operative site underwater in a pool or bathtub type environment for least 1 more week.  Encouraged her to continue working with physical therapy for range of motioning exercises of the right shoulder.  Instructed her to work on continued range of motion of the digits, wrist, forearm, and elbow with the right upper extremity to reduce any stiffness there.  I do anticipate some stiffness about the shoulder considering the nature of the fracture and surgery.  This will be worked on with physical therapy.  We will check her back in 4 weeks to assess her progress with therapy and obtain repeat radiographs at that time.  She declined need for refill of pain medication today.  She will contact our office if she does need a refill at some point in the future.  We would be more than glad to refill as needed.      Guevara Santillan, TANIYAS, PA-C    This note was created using 48domain voice recognition software that occasionally  misinterprets words or phrases.

## 2024-03-19 PROBLEM — D64.9 NORMOCHROMIC NORMOCYTIC ANEMIA: Status: ACTIVE | Noted: 2024-03-19

## 2024-03-19 PROBLEM — Z87.81 HISTORY OF ARM FRACTURE: Status: ACTIVE | Noted: 2024-03-19

## 2024-03-19 NOTE — PROGRESS NOTES
[facility]  History and Physical    Subjective:      Patient ID:   NAME: Paola Ibanez : 1960     64 y.o. female    Referring Doc: Juan Carlos  Other Physicians: Jacque/Ernestina/Lisa; Guevara Ch,     Chief Complaint: humeral fracture      HPI:  64 y.o. female with diagnosis of suspicious humeral fracture who was recently hospitalized at [facility]. The patient returns for a new patient visit today to go over results of recently ordered tests, studies and/or labs. She is here with son. She is in wheelchair today. She had fallen and broken her arm and has since had surgery. She is planning PT in future. She just saw Jacque with ortho earlier today.    Breathing ok, no CP, HA's or N/V        ROS:   GEN: normal without any fever, night sweats or weight loss- residual right arm pain and discomfort  HEENT: normal with no HA's, sore throat, stiff neck, changes in vision  CV: normal with no CP, SOB, PND, SANDOVAL or orthopnea  PULM: normal with no SOB, cough, hemoptysis, sputum or pleuritic pain  GI: normal with no abdominal pain, nausea, vomiting, constipation, diarrhea, melanotic stools, BRBPR, or hematemesis  : normal with no hematuria, dysuria  BREAST: normal with no mass, discharge, pain  SKIN: normal with no rash, erythema, bruising, or swelling     Past Medical/Surgical History:  Past Medical History:   Diagnosis Date    A-fib     History of arm fracture 2024    Hypertension     Normochromic normocytic anemia 2024     Past Surgical History:   Procedure Laterality Date    HYSTERECTOMY      INSERTION OF INTRAMEDULLARY ROBIN Right 2024    Procedure: INSERTION, INTRAMEDULLARY ROBIN;  Surgeon: Luis Gonzalez MD;  Location: [facility];  Service: Orthopedics;  Laterality: Right;  Ken notified 24 ark         Allergies:  Review of patient's allergies indicates:   Allergen Reactions    Aspirin Nausea Only     Stomach Pain  Stomach Pain      Lisinopril Other (See Comments)      Cough  Cough      Penicillin g potassium Rash     Rash  Rash         Social/Family History:  Social History     Socioeconomic History    Marital status: Single   Tobacco Use    Smoking status: Never    Smokeless tobacco: Never   Substance and Sexual Activity    Alcohol use: Not Currently    Drug use: Never     Family History   Problem Relation Age of Onset    Seizures Sister     Breast cancer Mother          Medications:    Current Outpatient Medications:     acetaminophen (TYLENOL) 650 MG TbSR, Take 1 tablet (650 mg total) by mouth every 8 (eight) hours. (Patient taking differently: Take 325 mg by mouth every 8 (eight) hours.), Disp: 30 tablet, Rfl: 0    albuterol (PROVENTIL/VENTOLIN HFA) 90 mcg/actuation inhaler, Inhale 1-2 puffs into the lungs every 6 (six) hours as needed for Wheezing. Rescue, Disp: 18 g, Rfl: 0    aspirin (ECOTRIN) 81 MG EC tablet, Take 1 tablet by mouth every morning., Disp: , Rfl:     carvediloL (COREG) 6.25 MG tablet, Take 6.25 mg by mouth 2 (two) times daily., Disp: , Rfl:     cloNIDine (CATAPRES) 0.1 MG tablet, Take 0.1 mg by mouth every 8 (eight) hours as needed., Disp: , Rfl:     cyanocobalamin 2,000 mcg Tab, Take 2,000 mcg by mouth once daily., Disp: 30 tablet, Rfl: 5    fluticasone propionate (FLONASE) 50 mcg/actuation nasal spray, 1 spray by Nasal route once daily., Disp: , Rfl:     furosemide (LASIX) 40 MG tablet, Take 40 mg by mouth daily as needed., Disp: , Rfl:     hydrALAZINE (APRESOLINE) 25 MG tablet, Take 25 mg by mouth every 8 (eight) hours., Disp: , Rfl:     hydroCHLOROthiazide (HYDRODIURIL) 25 MG tablet, Take 25 mg by mouth every morning., Disp: , Rfl:     HYDROcodone-acetaminophen (NORCO) 5-325 mg per tablet, Take 1 tablet by mouth every 6 (six) hours as needed for Pain., Disp: 30 tablet, Rfl: 0    losartan (COZAAR) 100 MG tablet, Take 1 tablet (100 mg total) by mouth once daily., Disp: 30 tablet, Rfl: 5    metoprolol tartrate (LOPRESSOR) 50 MG tablet, Take 1 tablet (50 mg  "total) by mouth 2 (two) times daily., Disp: 60 tablet, Rfl: 3    potassium chloride SA (K-DUR,KLOR-CON) 20 MEQ tablet, Take 20 mEq by mouth daily as needed., Disp: , Rfl:     senna-docusate 8.6-50 mg (PERICOLACE) 8.6-50 mg per tablet, Take 1 tablet by mouth 2 (two) times a day., Disp: 60 tablet, Rfl: 1      Pathology:   Cancer Staging   No matching staging information was found for the patient.        Objective:   Vitals:  Blood pressure (!) 189/83, pulse 63, temperature 97.2 °F (36.2 °C), resp. rate 18, height 5' 1" (1.549 m), weight 99.7 kg (219 lb 11.2 oz).    Physical Examination:   GEN: no apparent distress, comfortable; AAOx3; overweight  HEAD: atraumatic and normocephalic  EYES: no pallor, no icterus, PERRLA  ENT: OMM, no pharyngeal erythema, external ears WNL; no nasal discharge; no thrush  NECK: no masses, thyroid normal, trachea midline, no LAD/LN's, supple  CV: RRR with no murmur; normal pulse; normal S1 and S2; no pedal edema  CHEST: Normal respiratory effort; CTAB; normal breath sounds; no wheeze or crackles  ABDOM: nontender and nondistended; soft; normal bowel sounds; no rebound/guarding  MUSC/Skeletal: ROM normal; no crepitus; joints normal; no deformities or arthropathy  EXTREM: no clubbing, cyanosis, inflammation or swelling; s/p right arm surgery/ORIF; stitches are now out  SKIN: no rashes, lesions, ulcers, petechiae or subcutaneous nodules  : no zuleta  NEURO: grossly intact; motor/sensory WNL; AAOx3; no tremors  PSYCH: normal mood, affect and behavior  LYMPH: normal cervical, supraclavicular, axillary and groin LN's      Labs:   Lab Results   Component Value Date    WBC 6.78 02/28/2024    HGB 9.5 (L) 02/28/2024    HCT 29.9 (L) 02/28/2024    MCV 99 (H) 02/28/2024     02/28/2024    CMP  Sodium   Date Value Ref Range Status   02/28/2024 135 (L) 136 - 145 mmol/L Final     Potassium   Date Value Ref Range Status   02/28/2024 4.0 3.5 - 5.1 mmol/L Final     Chloride   Date Value Ref Range " Status   02/28/2024 104 95 - 110 mmol/L Final     CO2   Date Value Ref Range Status   02/28/2024 24 23 - 29 mmol/L Final     Glucose   Date Value Ref Range Status   02/28/2024 108 70 - 110 mg/dL Final     BUN   Date Value Ref Range Status   02/28/2024 16 8 - 23 mg/dL Final     Creatinine   Date Value Ref Range Status   02/28/2024 0.6 0.5 - 1.4 mg/dL Final     Calcium   Date Value Ref Range Status   02/28/2024 10.3 8.7 - 10.5 mg/dL Final     Total Protein   Date Value Ref Range Status   02/28/2024 6.6 6.0 - 8.4 g/dL Final     Albumin   Date Value Ref Range Status   02/28/2024 2.9 (L) 3.5 - 5.2 g/dL Final     Total Bilirubin   Date Value Ref Range Status   02/28/2024 0.9 0.1 - 1.0 mg/dL Final     Comment:     For infants and newborns, interpretation of results should be based  on gestational age, weight and in agreement with clinical  observations.    Premature Infant recommended reference ranges:  Up to 24 hours.............<8.0 mg/dL  Up to 48 hours............<12.0 mg/dL  3-5 days..................<15.0 mg/dL  6-29 days.................<15.0 mg/dL       Alkaline Phosphatase   Date Value Ref Range Status   02/28/2024 88 55 - 135 U/L Final     AST   Date Value Ref Range Status   02/28/2024 30 10 - 40 U/L Final     ALT   Date Value Ref Range Status   02/28/2024 24 10 - 44 U/L Final     Anion Gap   Date Value Ref Range Status   02/28/2024 7 (L) 8 - 16 mmol/L Final     eGFR if    Date Value Ref Range Status   04/12/2021 >60 >60 mL/min/1.73 m^2 Final     eGFR if non    Date Value Ref Range Status   04/12/2021 >60 >60 mL/min/1.73 m^2 Final     Comment:     Calculation used to obtain the estimated glomerular filtration  rate (eGFR) is the CKD-EPI equation.          Lab Results   Component Value Date    [id number] <146 (L) 02/27/2024     Lab Results   Component Value Date    FOLATE 6.3 02/27/2024       IgG 586 - 1602 mg/dL 2070 High  2070 High    IgA 87 - 352 mg/dL 62 Low  62 Low    IgM 26 -  217 mg/dL 22 Low       Free Kappa Lt Chains,S 3.3 - 19.4 mg/L 26.5 High    Free Lambda Lt Chains,S 5.7 - 26.3 mg/L 7.4   Kappa/Lambda FLC Ratio 0.26 - 1.65 3.58 High      Immunofix Interp.  Comment Abnormal     Comment: Immunofixation shows IgG monoclonal protein  with kappa light chain specificity.          Component Ref Range & Units 4 wk ago   Beta-2 Microglobulin 0.6 - 2.4 mg/L 1.4      ntains abnormal data Protein Electrophoresis, Serum  Order: [id number]  Status: Final result       Visible to patient: No (inaccessible in Patient Portal)       Next appt: 04/09/2024 at 08:45 AM in Orthopedics (Luis Gonzalez MD)       Dx: Bone lesion; Pathological fracture of...    0 Result Notes   important suggestion  Newer results are available. Click to view them now.                Component Ref Range & Units 4 wk ago  (2/21/24) 4 wk ago  (2/21/24) 1 mo ago  (2/20/24) 1 yr ago  (3/17/23) 2 yr ago  (4/12/21) 3 yr ago  (12/29/20) 3 yr ago  (5/5/20)   Total Protein 6.0 - 8.5 g/dL 6.9 6.9 R 8.1 R 8.2 R 8.4 R 7.8 R 8.1 R   Albumin 2.9 - 4.4 g/dL 3.2         Alpha-1 0.0 - 0.4 g/dL 0.3         Alpha-2 0.4 - 1.0 g/dL 0.8         Beta 0.7 - 1.3 g/dL 0.8         Gamma 0.4 - 1.8 g/dL 1.8         M-SPIKE, PROT ELECTRO Not Observed g/dL 1.4 High                 I have reviewed all available lab results and radiology reports.    Radiology/Diagnostic Studies:      X-ray Shoulder 2 or More Views Right [[id number]] Collected: 02/20/24 1906   Order Status: Completed Updated: 02/20/24 2005   Narrative:     CLINICAL HISTORY:  64 years (1960) Female fall Fall (Family found pt on floor around 1615. Pt states she fell around about 11am this morning. )    TECHNIQUE:  XR SHOULDER 2 OR MORE VIEWS RIGHT. 2 images obtained.    COMPARISON:  None available.    FINDINGS:    2 views the right shoulder reveal evidence of pathologic fracture of the humeral diaphysis extending through the surgical neck of the humerus with mild medial deviation of the  diaphysis of component. Fracture involves the greater tubercle without significant osseous destruction. The patient's upper extremities held in flexion.    IMPRESSION: Pathologic fracture through the surgical neck of humerus with involvement of greater tubercle.      X-Ray Humerus 2 View Right [[id number]] Collected: 02/20/24 1905   Order Status: Completed Updated: 02/20/24 2003   Narrative:     CLINICAL HISTORY:  64 years (1960) Female Fall (Family found pt on floor around 1615. Pt states she fell around about 11am this morning. )    TECHNIQUE:  XR HUMERUS RIGHT. 2 images obtained.    COMPARISON:  None available.    FINDINGS:    2 views right humerus reveal evidence of pathologic fracture through the surgical neck of the humerus with well demarcated intermargin suggestive of a primary cystic lesion through the surgical neck of humerus with involvement of the lesser tubercle. Humeral head maintains appropriate alignment. Marginal clockwise rotation of the humeral head component.    IMPRESSION: Pathologic fracture through the surgical neck of the humerus with cystic component in the proximal humeral diaphysis. Recommend supplement evaluation with CT scan of the upper extremity once the initial traumatic episode has resolved            XR Ribs Min 3 Views w/PA Chest Right [[id number]] Collected: 02/20/24 1906   Order Status: Completed Updated: 02/20/24 2004   Narrative:     CLINICAL HISTORY:  64 years (1960) Female fall Fall (Family found pt on floor around 1615. Pt states she fell around about 11am this morning. )    TECHNIQUE:  XR RIBS RIGHT WITH CHEST. 6 images obtained.    COMPARISON:  None available.    FINDINGS:    Anterior and posterior arches of the posterior ribs are well-maintained without evidence of an acute fracture. No evidence of traumatic episode. Normal alignment. Chronic degenerative spondylosis changes of the thoracic spine on the basis of diffuse idiopathic skeletal hyperostosis.  Pathologic fracture of the proximal humeral diaphysis through the surgical neck.    IMPRESSION:  1.  No evidence of acute traumatic injury.  2.  Pathologic fracture of the proximal humeral diaphysis through the surgical neck.                  MRI Shoulder Without Contrast Right [[id number]] Collected: 02/21/24 1110   Order Status: Completed Updated: 02/21/24 1237   Narrative:     MR SHOULDER WITHOUT IV CONTRAST RIGHT    CLINICAL HISTORY:  64 years Female pathologic humerus fracture    COMPARISON: Right shoulder radiography February 20, 2024    FINDINGS:    Acute comminuted fracture involving the surgical neck of the right humerus. Mild medial and anterior displacement of the minimally angulated and impacted fracture fragments. Fracture extends through the greater tuberosity, with intact rotator cuff tendons inserting upon the greater tuberosity fracture fragment. The inferior glenohumeral ligament appears intact. Glenohumeral joint effusion. No fracture extension through the humeral head articular surface. Heterogeneous signal intensity at the fracture site consistent with hemorrhage/hematoma.    Marked subcutaneous edema of the shoulder, as well as diffuse intramuscular edema within the deltoid, likely representing contusion. There is also intramuscular edema within the teres minor muscle.    Moderate AC joint osteoarthrosis. Small foci of subchondral edema within the glenoid, likely secondary to glenohumeral osteoarthrosis.    IMPRESSION:    Acute comminuted mildly displaced/angulated fracture through the surgical neck of the right humerus, with involvement of the greater tuberosity.       Skeletal Xray survey:  2/21/2024:    IMPRESSION:     No convincing radiographic evidence of osseous metastasis.    Bone scan 2/22/2024:    IMPRESSION:  No evidence of osseous metastatic disease.    All lab results and imaging results have been reviewed and discussed with the patient    Assessment:   (1) 64 y.o. female with  "diagnosis of suspicious humeral fracture who was recently hospitalized at [facility]. The patient is here for a new patient visit today to go over results of recently ordered tests, studies and/or labs.        - previously unknown to our service who presented to ED at [facility] with right arm pain after falling at home earlier today.  Right humerus x-ray showed a fracture through the surgical neck of the humerus with cystic component in the proximal humeral diaphysis. - She was seen by Lucho FONSECA with orthopedics, and he had consulted oncology out of concerns for possible pathologic fracture.      2/21/2024:  - Patient discharged to home from ED prior to being seen by myself - planned f/u with ortho. Discussed with Dr Norton and ortho has reported that it is not a "pathologic" fracture after all upon review of the MRI     3/21/2024:  - anemia with profound B12 deficiency  - check iron panel  - start B12 injections monthly  - discussed the presence of a M-protein o0n yuliana blood work  - she at least has MGUS but I am also concerned that she may have multiple myeloma  - she had bone scan on 2/22/2024 and skeletal xray survery        (2) Atrial fib with RVR for which she has not been taking any prophylactic anticoagulation for several years     (3) HTN     (4) Diverticulosis with hx/of lower GIB; chronic idiopathic constipation     (5) Hx/of hysterectomy     (6) Hyponatremia (mild borderline)    (7) Anemia - NCNC parameters    (8) B12 deficiency               VISIT DIAGNOSES:      1. Anemia due to vitamin B12 deficiency, unspecified B12 deficiency type    2. History of arm fracture    3. Normochromic normocytic anemia            Plan:     PLAN:  Start B12 injections monthly  Proceed with ortho f/u and PT  Once right arm is better, then will consider getting bone marrow biopsy   F/u with Juan Carlos and Dr Red  RTC 4 weeks      Fax note to Jacque/Edmund Gonzalez Mahoney              Anemia due to vitamin B12 deficiency, " unspecified B12 deficiency type    History of arm fracture    Normochromic normocytic anemia      No follow-ups on file.        I have explained and the patient understands all of  the current recommendation(s). I have answered all of their questions to the best of my ability and to their complete satisfaction.             Thank you for allowing me to participate in this pleasant patient's care. Please call with any questions or concerns.      Electronically signed Chirag Brown MD

## 2024-03-20 ENCOUNTER — TELEPHONE (OUTPATIENT)
Dept: HEMATOLOGY/ONCOLOGY | Facility: CLINIC | Age: 64
End: 2024-03-20

## 2024-03-20 NOTE — TELEPHONE ENCOUNTER
----- Message from Roxi Holm sent at 3/20/2024  9:26 AM CDT -----  Pt is calling to ask why she has a hospital f/u, she only broke her arm. She is scheduled 03/21/24 tomorrow.     [phone]

## 2024-03-20 NOTE — TELEPHONE ENCOUNTER
Message reviewed with Dr. Brown, per his verbal orders I attempted to call patient to make aware that Dr. Brown would like her to keep scheduled f/u as he ordered labs while she was inpatient and he would like to review those results with her at tomorrows appt. No answer, LVM with above info and instructions to call if needs to make changes to appt.

## 2024-03-20 NOTE — TELEPHONE ENCOUNTER
Spoke to patient who inquired about reason for f/u tomorrow, I made her aware that labs ordered while hospitalized and that she will need to come into office to review with Dr Brown, she requested that I review these with her, I made her aware that I am unable to interpret the results of these labs and that this would be done at f/u tomorrow with Dr. Brown who will be able to answer any questions she may have concerning this. Verbalized understanding, states she will need to reschedule due to transportation issues and broken arm. Bessy made aware to call patient to reschedule.

## 2024-03-21 ENCOUNTER — TELEPHONE (OUTPATIENT)
Dept: HEMATOLOGY/ONCOLOGY | Facility: CLINIC | Age: 64
End: 2024-03-21

## 2024-03-21 ENCOUNTER — OFFICE VISIT (OUTPATIENT)
Dept: HEMATOLOGY/ONCOLOGY | Facility: CLINIC | Age: 64
End: 2024-03-21
Payer: COMMERCIAL

## 2024-03-21 ENCOUNTER — OFFICE VISIT (OUTPATIENT)
Dept: ORTHOPEDICS | Facility: CLINIC | Age: 64
End: 2024-03-21
Payer: COMMERCIAL

## 2024-03-21 VITALS — WEIGHT: 233 LBS | HEIGHT: 61 IN | BODY MASS INDEX: 43.99 KG/M2

## 2024-03-21 VITALS
DIASTOLIC BLOOD PRESSURE: 83 MMHG | HEART RATE: 63 BPM | RESPIRATION RATE: 18 BRPM | WEIGHT: 219.69 LBS | TEMPERATURE: 97 F | HEIGHT: 61 IN | SYSTOLIC BLOOD PRESSURE: 189 MMHG | BODY MASS INDEX: 41.48 KG/M2

## 2024-03-21 DIAGNOSIS — D51.9 ANEMIA DUE TO VITAMIN B12 DEFICIENCY, UNSPECIFIED B12 DEFICIENCY TYPE: Primary | ICD-10-CM

## 2024-03-21 DIAGNOSIS — R77.8 ABNORMAL SPEP: Primary | ICD-10-CM

## 2024-03-21 DIAGNOSIS — Z98.890 STATUS POST SHOULDER SURGERY: Primary | ICD-10-CM

## 2024-03-21 DIAGNOSIS — D47.2 MONOCLONAL PARAPROTEINEMIA: ICD-10-CM

## 2024-03-21 DIAGNOSIS — D64.9 NORMOCHROMIC NORMOCYTIC ANEMIA: ICD-10-CM

## 2024-03-21 DIAGNOSIS — R77.8 ABNORMAL SPEP: ICD-10-CM

## 2024-03-21 DIAGNOSIS — Z87.81 HISTORY OF ARM FRACTURE: ICD-10-CM

## 2024-03-21 DIAGNOSIS — D51.9 ANEMIA DUE TO VITAMIN B12 DEFICIENCY, UNSPECIFIED B12 DEFICIENCY TYPE: ICD-10-CM

## 2024-03-21 PROCEDURE — 3288F FALL RISK ASSESSMENT DOCD: CPT | Mod: CPTII,S$GLB,, | Performed by: INTERNAL MEDICINE

## 2024-03-21 PROCEDURE — 1111F DSCHRG MED/CURRENT MED MERGE: CPT | Mod: CPTII,S$GLB,, | Performed by: INTERNAL MEDICINE

## 2024-03-21 PROCEDURE — 1125F AMNT PAIN NOTED PAIN PRSNT: CPT | Mod: CPTII,S$GLB,, | Performed by: ORTHOPAEDIC SURGERY

## 2024-03-21 PROCEDURE — 3077F SYST BP >= 140 MM HG: CPT | Mod: CPTII,S$GLB,, | Performed by: INTERNAL MEDICINE

## 2024-03-21 PROCEDURE — 1100F PTFALLS ASSESS-DOCD GE2>/YR: CPT | Mod: CPTII,S$GLB,, | Performed by: ORTHOPAEDIC SURGERY

## 2024-03-21 PROCEDURE — 99203 OFFICE O/P NEW LOW 30 MIN: CPT | Mod: S$GLB,,, | Performed by: INTERNAL MEDICINE

## 2024-03-21 PROCEDURE — 1125F AMNT PAIN NOTED PAIN PRSNT: CPT | Mod: CPTII,S$GLB,, | Performed by: INTERNAL MEDICINE

## 2024-03-21 PROCEDURE — 3288F FALL RISK ASSESSMENT DOCD: CPT | Mod: CPTII,S$GLB,, | Performed by: ORTHOPAEDIC SURGERY

## 2024-03-21 PROCEDURE — 3079F DIAST BP 80-89 MM HG: CPT | Mod: CPTII,S$GLB,, | Performed by: INTERNAL MEDICINE

## 2024-03-21 PROCEDURE — 4010F ACE/ARB THERAPY RXD/TAKEN: CPT | Mod: CPTII,S$GLB,, | Performed by: ORTHOPAEDIC SURGERY

## 2024-03-21 PROCEDURE — 1160F RVW MEDS BY RX/DR IN RCRD: CPT | Mod: CPTII,S$GLB,, | Performed by: INTERNAL MEDICINE

## 2024-03-21 PROCEDURE — 99024 POSTOP FOLLOW-UP VISIT: CPT | Mod: S$GLB,,, | Performed by: ORTHOPAEDIC SURGERY

## 2024-03-21 PROCEDURE — 3008F BODY MASS INDEX DOCD: CPT | Mod: CPTII,S$GLB,, | Performed by: INTERNAL MEDICINE

## 2024-03-21 PROCEDURE — 1159F MED LIST DOCD IN RCRD: CPT | Mod: CPTII,S$GLB,, | Performed by: INTERNAL MEDICINE

## 2024-03-21 PROCEDURE — 1100F PTFALLS ASSESS-DOCD GE2>/YR: CPT | Mod: CPTII,S$GLB,, | Performed by: INTERNAL MEDICINE

## 2024-03-21 PROCEDURE — 4010F ACE/ARB THERAPY RXD/TAKEN: CPT | Mod: CPTII,S$GLB,, | Performed by: INTERNAL MEDICINE

## 2024-03-21 NOTE — PROGRESS NOTES
[facility]    Subjective:     Chief Complaint:   Chief Complaint   Patient presents with    Right Shoulder - Post-op Evaluation     F/u XR PO IM Robin proximal humerus 2.26.24 notes increased aching to nagging pain 6/10. Home health was concerned with possible internal infect due to muscle tightness and skin being warm to touch       Past Medical History:   Diagnosis Date    A-fib     History of arm fracture 03/19/2024    Hypertension     Normochromic normocytic anemia 03/19/2024       Past Surgical History:   Procedure Laterality Date    HYSTERECTOMY      INSERTION OF INTRAMEDULLARY ROBIN Right 2/26/2024    Procedure: INSERTION, INTRAMEDULLARY ROBIN;  Surgeon: Luis Gonzalez MD;  Location: [facility];  Service: Orthopedics;  Laterality: Right;  Ken notified 2/26/24 ark       Current Outpatient Medications   Medication Sig    acetaminophen (TYLENOL) 650 MG TbSR Take 1 tablet (650 mg total) by mouth every 8 (eight) hours. (Patient taking differently: Take 325 mg by mouth every 8 (eight) hours.)    albuterol (PROVENTIL/VENTOLIN HFA) 90 mcg/actuation inhaler Inhale 1-2 puffs into the lungs every 6 (six) hours as needed for Wheezing. Rescue    aspirin (ECOTRIN) 81 MG EC tablet Take 1 tablet by mouth every morning.    carvediloL (COREG) 6.25 MG tablet Take 6.25 mg by mouth 2 (two) times daily.    cloNIDine (CATAPRES) 0.1 MG tablet Take 0.1 mg by mouth every 8 (eight) hours as needed.    cyanocobalamin 2,000 mcg Tab Take 2,000 mcg by mouth once daily.    fluticasone propionate (FLONASE) 50 mcg/actuation nasal spray 1 spray by Nasal route once daily.    furosemide (LASIX) 40 MG tablet Take 40 mg by mouth daily as needed.    hydrALAZINE (APRESOLINE) 25 MG tablet Take 25 mg by mouth every 8 (eight) hours.    hydroCHLOROthiazide (HYDRODIURIL) 25 MG tablet Take 25 mg by mouth every morning.    HYDROcodone-acetaminophen (NORCO) 5-325 mg per tablet Take 1 tablet by mouth every 6 (six) hours as needed for Pain.    losartan  (COZAAR) 100 MG tablet Take 1 tablet (100 mg total) by mouth once daily.    metoprolol tartrate (LOPRESSOR) 50 MG tablet Take 1 tablet (50 mg total) by mouth 2 (two) times daily.    potassium chloride SA (K-DUR,KLOR-CON) 20 MEQ tablet Take 20 mEq by mouth daily as needed.    senna-docusate 8.6-50 mg (PERICOLACE) 8.6-50 mg per tablet Take 1 tablet by mouth 2 (two) times a day.     No current facility-administered medications for this visit.       Review of patient's allergies indicates:   Allergen Reactions    Aspirin Nausea Only     Stomach Pain  Stomach Pain      Lisinopril Other (See Comments)     Cough  Cough      Penicillin g potassium Rash     Rash  Rash         Family History   Problem Relation Age of Onset    Seizures Sister     Breast cancer Mother        Social History     Socioeconomic History    Marital status: Single   Tobacco Use    Smoking status: Never    Smokeless tobacco: Never   Substance and Sexual Activity    Alcohol use: Not Currently    Drug use: Never       History of present illness:  Ms. Guevara comes in today for follow-up for her right humerus open reduction internal fixation.  She is about 3 weeks postop.  She has been working with home health physical therapy.  They have concerns of possible infection due to some warmth about the operative site and some firmness/tightness in the soft tissues.    Review of Systems:    Constitution: Negative for chills, fever, and sweats.  Negative for unexplained weight loss.    HENT:  Negative for headaches and blurry vision.    Cardiovascular:Negative for chest pain or irregular heart beat. Negative for hypertension.    Respiratory:  Negative for cough and shortness of breath.    Gastrointestinal: Negative for abdominal pain, heartburn, melena, nausea, and vomitting.    Genitourinary:  Negative bladder incontinence and dysuria.    Musculoskeletal:  See HPI for details.     Neurological: Negative for numbness.    Psychiatric/Behavioral: Negative for  "depression.  The patient is not nervous/anxious.      Endocrine: Negative for polyuria    Hematologic/Lymphatic: Negative for bleeding problem.  Does not bruise/bleed easily.    Skin: Negative for poor would healing and rash    Objective:      Physical Examination:    Vital Signs:  There were no vitals filed for this visit.    Body mass index is 44.03 kg/m².    This a well-developed, well nourished patient in no acute distress.  They are alert and oriented and cooperative to examination.        Right upper extremity exam: Skin throughout the right upper extremity is clean dry and intact.  No erythema or ecchymosis.  Surgical incisions are all well healed without wound dehiscence or drainage.  She is neurovascularly intact throughout the right upper extremity.  She can open and close right hand into a fist.  She can flex/extend at the right wrist and pronate/supinate the right forearm.  She can fully flex at the right elbow and lacks about 10° of full extension.    Pertinent New Results:    XRAY Report / Interpretation:   Two views taken of the right shoulder today: AP and Y-view.  They reveal an intramedullary early placed proximal humerus nail with cross fixed screws.  No evidence of hardware failure.  She has reduction of the proximal humerus fracture.  Glenohumeral joint is maintained.    Assessment/Plan:      1. Status post right proximal humerus open reduction internal fixation.    She is progressing well at this stage postoperatively.  She needs to continue working with physical therapy to improve range of motion and strengthening of the right shoulder.  We will check her back again in 3 weeks with repeat x-rays.    Guevara Santillan, Physician Assistant, served in the capacity as a "scribe" for this patient encounter.  A "face-to-face" encounter occurred with Dr. Luis Gonzalez on this date.  The treatment plan and medical decision-making is outlined above. Patient was seen and examined with a chaperone. "       This note was created using Dragon voice recognition software that occasionally misinterpreted phrases or words.

## 2024-03-21 NOTE — TELEPHONE ENCOUNTER
"Following patient appt today, I attempted to place orders for monthly B12 injections here at the cancer center per Dr. Brown's verbal orders to do so when noticed that patient has [employer] insurance which is not accepted at this facility, Monserrat Green made aware, per her orders I made Dr. Brown aware of above and inquired as to who we should refer patient to, he states "anyone who will take her insurance, she needs to see oncology for possible MGUS/Myeloma". I made Monserrat Green and [place] aware, informed that I can place referral if they just let me know who referral will need to go to.   "

## 2024-03-21 NOTE — TELEPHONE ENCOUNTER
Referral placed to Hem/Onc with note to send to [facility] as [place] states this may be the only facility that will accept her insurance, but she will continue looking in [place].

## 2024-03-25 ENCOUNTER — TELEPHONE (OUTPATIENT)
Dept: HEMATOLOGY/ONCOLOGY | Facility: CLINIC | Age: 64
End: 2024-03-25

## 2024-03-25 NOTE — PROGRESS NOTES
[facility] no longer accepts [employer]; patient was referred to [facility] since they are the closet facility that accepts patient's insurance.  I received a call from Arslan; she contacted patient to schedule and patient said she doesn't need follow-up.  I asked Funmilayo Flores, RN Navigator, to contact patient to explain that she must follow-up with a Hem/Onc MD.

## 2024-03-25 NOTE — TELEPHONE ENCOUNTER
Spoke with pt in regards to [employer] being out of network for Dr. Brown. Pt was contacted on Friday by [facility] hem/onc and she declined an appt. I stated the importance of pt continuing her care with a hem/onc. She states her b12 was low and that she is taking b12 for that now. I stated that Dr. Brown was wanting additional test after her right shoulder had healed. She said he mentioned a bone marrow biopsy and she just had a bone density test completed. I explained these are two different things and aren't related. I explained to pt that Dr. Brown wants her to follow with a hem/onc provider in network for follow up/additional work up.  She states that she does not wish to do a bone marrow biopsy nor follow up with another MD. Pt instructed to talk with her PCP in regards to this.

## 2024-04-09 ENCOUNTER — EXTERNAL HOME HEALTH (OUTPATIENT)
Dept: HOME HEALTH SERVICES | Facility: HOSPITAL | Age: 64
End: 2024-04-09
Payer: COMMERCIAL

## 2024-04-11 ENCOUNTER — OFFICE VISIT (OUTPATIENT)
Dept: ORTHOPEDICS | Facility: CLINIC | Age: 64
End: 2024-04-11
Payer: COMMERCIAL

## 2024-04-11 VITALS
DIASTOLIC BLOOD PRESSURE: 68 MMHG | HEIGHT: 61 IN | WEIGHT: 219.81 LBS | BODY MASS INDEX: 41.5 KG/M2 | SYSTOLIC BLOOD PRESSURE: 136 MMHG

## 2024-04-11 DIAGNOSIS — Z98.890 STATUS POST SHOULDER SURGERY: Primary | ICD-10-CM

## 2024-04-11 PROCEDURE — 3078F DIAST BP <80 MM HG: CPT | Mod: CPTII,S$GLB,, | Performed by: ORTHOPAEDIC SURGERY

## 2024-04-11 PROCEDURE — 3288F FALL RISK ASSESSMENT DOCD: CPT | Mod: CPTII,S$GLB,, | Performed by: ORTHOPAEDIC SURGERY

## 2024-04-11 PROCEDURE — 1159F MED LIST DOCD IN RCRD: CPT | Mod: CPTII,S$GLB,, | Performed by: ORTHOPAEDIC SURGERY

## 2024-04-11 PROCEDURE — 1160F RVW MEDS BY RX/DR IN RCRD: CPT | Mod: CPTII,S$GLB,, | Performed by: ORTHOPAEDIC SURGERY

## 2024-04-11 PROCEDURE — 4010F ACE/ARB THERAPY RXD/TAKEN: CPT | Mod: CPTII,S$GLB,, | Performed by: ORTHOPAEDIC SURGERY

## 2024-04-11 PROCEDURE — 99024 POSTOP FOLLOW-UP VISIT: CPT | Mod: S$GLB,,, | Performed by: ORTHOPAEDIC SURGERY

## 2024-04-11 PROCEDURE — 1100F PTFALLS ASSESS-DOCD GE2>/YR: CPT | Mod: CPTII,S$GLB,, | Performed by: ORTHOPAEDIC SURGERY

## 2024-04-11 PROCEDURE — 1125F AMNT PAIN NOTED PAIN PRSNT: CPT | Mod: CPTII,S$GLB,, | Performed by: ORTHOPAEDIC SURGERY

## 2024-04-11 PROCEDURE — 3075F SYST BP GE 130 - 139MM HG: CPT | Mod: CPTII,S$GLB,, | Performed by: ORTHOPAEDIC SURGERY

## 2024-04-11 RX ORDER — HYDROCODONE BITARTRATE AND ACETAMINOPHEN 5; 325 MG/1; MG/1
1 TABLET ORAL EVERY 6 HOURS PRN
Qty: 30 TABLET | Refills: 0 | Status: SHIPPED | OUTPATIENT
Start: 2024-04-11

## 2024-04-11 NOTE — PROGRESS NOTES
[facility] ORTH[facility]ED[facility]    Subjective:     Chief Complaint:   Chief Complaint   Patient presents with    Right Shoulder - Post-op Evaluation     F/u xray PO right shoulder IM ROBIN 2.26.24       Past Medical History:   Diagnosis Date    A-fib     Abnormal SPEP 03/21/2024    History of arm fracture 03/19/2024    Hypertension     Monoclonal paraproteinemia 03/21/2024    Normochromic normocytic anemia 03/19/2024       Past Surgical History:   Procedure Laterality Date    HYSTERECTOMY      INSERTION OF INTRAMEDULLARY ROBIN Right 2/26/2024    Procedure: INSERTION, INTRAMEDULLARY ROBIN;  Surgeon: Luis Gonzalez MD;  Location: [facility];  Service: Orthopedics;  Laterality: Right;  Ken notified 2/26/24 ark       Current Outpatient Medications   Medication Sig    acetaminophen (TYLENOL) 650 MG TbSR Take 1 tablet (650 mg total) by mouth every 8 (eight) hours. (Patient taking differently: Take 325 mg by mouth every 8 (eight) hours.)    albuterol (PROVENTIL/VENTOLIN HFA) 90 mcg/actuation inhaler Inhale 1-2 puffs into the lungs every 6 (six) hours as needed for Wheezing. Rescue    aspirin (ECOTRIN) 81 MG EC tablet Take 1 tablet by mouth every morning.    carvediloL (COREG) 6.25 MG tablet Take 6.25 mg by mouth 2 (two) times daily.    cloNIDine (CATAPRES) 0.1 MG tablet Take 0.1 mg by mouth every 8 (eight) hours as needed.    cyanocobalamin 2,000 mcg Tab Take 2,000 mcg by mouth once daily.    fluticasone propionate (FLONASE) 50 mcg/actuation nasal spray 1 spray by Nasal route once daily.    furosemide (LASIX) 40 MG tablet Take 40 mg by mouth daily as needed.    hydrALAZINE (APRESOLINE) 25 MG tablet Take 25 mg by mouth every 8 (eight) hours.    hydroCHLOROthiazide (HYDRODIURIL) 25 MG tablet Take 25 mg by mouth every morning.    HYDROcodone-acetaminophen (NORCO) 5-325 mg per tablet Take 1 tablet by mouth every 6 (six) hours as needed for Pain.    losartan (COZAAR) 100 MG tablet Take 1 tablet (100 mg total) by mouth once daily.    metoprolol  tartrate (LOPRESSOR) 50 MG tablet Take 1 tablet (50 mg total) by mouth 2 (two) times daily.    potassium chloride SA (K-DUR,KLOR-CON) 20 MEQ tablet Take 20 mEq by mouth daily as needed.    senna-docusate 8.6-50 mg (PERICOLACE) 8.6-50 mg per tablet Take 1 tablet by mouth 2 (two) times a day.     No current facility-administered medications for this visit.       Review of patient's allergies indicates:   Allergen Reactions    Aspirin Nausea Only     Stomach Pain  Stomach Pain      Lisinopril Other (See Comments)     Cough  Cough      Penicillin g potassium Rash     Rash  Rash         Family History   Problem Relation Age of Onset    Seizures Sister     Breast cancer Mother        Social History     Socioeconomic History    Marital status: Single   Tobacco Use    Smoking status: Never    Smokeless tobacco: Never   Substance and Sexual Activity    Alcohol use: Not Currently    Drug use: Never       History of present illness:  64-year-old female, returns to clinic today status post open reduction internal fixation of a right proximal humerus fracture with intramedullary nail.  This was done February the 26.  She is here today for routine follow-up.      Review of Systems:    Constitution: Negative for chills, fever, and sweats.  Negative for unexplained weight loss.    HENT:  Negative for headaches and blurry vision.    Cardiovascular:Negative for chest pain or irregular heart beat. Negative for hypertension.    Respiratory:  Negative for cough and shortness of breath.    Gastrointestinal: Negative for abdominal pain, heartburn, melena, nausea, and vomitting.    Genitourinary:  Negative bladder incontinence and dysuria.    Musculoskeletal:  See HPI for details.     Neurological: Negative for numbness.    Psychiatric/Behavioral: Negative for depression.  The patient is not nervous/anxious.      Endocrine: Negative for polyuria    Hematologic/Lymphatic: Negative for bleeding problem.  Does not bruise/bleed  "easily.    Skin: Negative for poor would healing and rash    Objective:      Physical Examination:    Vital Signs:    Vitals:    04/11/24 0853   BP: 136/68       Body mass index is 41.53 kg/m².    This a well-developed, well nourished patient in no acute distress.  They are alert and oriented and cooperative to examination.        Examination of the right shoulder, skin is dry and intact, no erythema or ecchymosis no signs symptoms of infection no evidence of wound failure dehiscence.  Active range of motion is limited secondary to pain and weakness.  Passive range of motion, I can forward flex her to 90 possibly 100°, externally rotate her to 40°, internally rotator to the right hip.  Pertinent New Results:    XRAY Report / Interpretation:   AP and lateral views of the right shoulder taken today in the office demonstrate a intramedullary lisbeth with multiple screws.  No evidence of hardware failure or loosening.  Interval healing is appreciated.    Assessment/Plan:      Right shoulder pain, status post open reduction internal fixation of a right proximal humerus fracture now 6 weeks ago.  She has been doing physical therapy, we will begin to increase active range of motion with therapy.  We refilled her pain medication, we will see her back in 6 weeks.    Benedict Carlos, Physician Assistant, served in the capacity as a "scribe" for this patient encounter.  A "face-to-face" encounter occurred with Dr. Luis Gonzalez on this date.  The treatment plan and medical decision-making is outlined above. Patient was seen and examined with a chaperone.       This note was created using Dragon voice recognition software that occasionally misinterpreted phrases or words.          "

## 2024-04-12 ENCOUNTER — TELEPHONE (OUTPATIENT)
Dept: HEMATOLOGY/ONCOLOGY | Facility: CLINIC | Age: 64
End: 2024-04-12

## 2024-04-12 NOTE — TELEPHONE ENCOUNTER
Standing lab orders Dc'd as due to patient insurance not being accepted at this facility she can no longer be seen by this office.

## 2024-04-18 ENCOUNTER — DOCUMENT SCAN (OUTPATIENT)
Dept: HOME HEALTH SERVICES | Facility: HOSPITAL | Age: 64
End: 2024-04-18
Payer: COMMERCIAL

## 2024-05-23 ENCOUNTER — OFFICE VISIT (OUTPATIENT)
Dept: ORTHOPEDICS | Facility: CLINIC | Age: 64
End: 2024-05-23
Payer: COMMERCIAL

## 2024-05-23 VITALS — WEIGHT: 231 LBS | HEIGHT: 61 IN | BODY MASS INDEX: 43.61 KG/M2

## 2024-05-23 DIAGNOSIS — Z98.890 STATUS POST SHOULDER SURGERY: Primary | ICD-10-CM

## 2024-05-23 PROCEDURE — 4010F ACE/ARB THERAPY RXD/TAKEN: CPT | Mod: CPTII,S$GLB,, | Performed by: ORTHOPAEDIC SURGERY

## 2024-05-23 PROCEDURE — 1159F MED LIST DOCD IN RCRD: CPT | Mod: CPTII,S$GLB,, | Performed by: ORTHOPAEDIC SURGERY

## 2024-05-23 PROCEDURE — 1125F AMNT PAIN NOTED PAIN PRSNT: CPT | Mod: CPTII,S$GLB,, | Performed by: ORTHOPAEDIC SURGERY

## 2024-05-23 PROCEDURE — 99024 POSTOP FOLLOW-UP VISIT: CPT | Mod: S$GLB,,, | Performed by: ORTHOPAEDIC SURGERY

## 2024-05-23 PROCEDURE — 1100F PTFALLS ASSESS-DOCD GE2>/YR: CPT | Mod: CPTII,S$GLB,, | Performed by: ORTHOPAEDIC SURGERY

## 2024-05-23 PROCEDURE — 1160F RVW MEDS BY RX/DR IN RCRD: CPT | Mod: CPTII,S$GLB,, | Performed by: ORTHOPAEDIC SURGERY

## 2024-05-23 PROCEDURE — 3288F FALL RISK ASSESSMENT DOCD: CPT | Mod: CPTII,S$GLB,, | Performed by: ORTHOPAEDIC SURGERY

## 2024-05-23 NOTE — PROGRESS NOTES
[facility] POST-OP [facility]    Subjective:           Chief Complaint:   Chief Complaint   Patient presents with    Right Shoulder - Post-op Evaluation     Patient is here for a PO f/u on right proximal humerus IM robin. States pain has improved since last visit. Has some numbness and aching through the arm.        Past Medical History:   Diagnosis Date    A-fib     Abnormal SPEP 03/21/2024    History of arm fracture 03/19/2024    Hypertension     Monoclonal paraproteinemia 03/21/2024    Normochromic normocytic anemia 03/19/2024       Past Surgical History:   Procedure Laterality Date    HYSTERECTOMY      INSERTION OF INTRAMEDULLARY ROBIN Right 2/26/2024    Procedure: INSERTION, INTRAMEDULLARY ROBIN;  Surgeon: Luis Gonzalez MD;  Location: [facility] OR;  Service: Orthopedics;  Laterality: Right;  Ken notified 2/26/24 ark       Current Outpatient Medications   Medication Sig    acetaminophen (TYLENOL) 650 MG TbSR Take 1 tablet (650 mg total) by mouth every 8 (eight) hours. (Patient taking differently: Take 325 mg by mouth every 8 (eight) hours.)    aspirin (ECOTRIN) 81 MG EC tablet Take 1 tablet by mouth every morning.    cloNIDine (CATAPRES) 0.1 MG tablet Take 0.1 mg by mouth every 8 (eight) hours as needed.    fluticasone propionate (FLONASE) 50 mcg/actuation nasal spray 1 spray by Nasal route once daily.    furosemide (LASIX) 40 MG tablet Take 40 mg by mouth daily as needed.    hydroCHLOROthiazide (HYDRODIURIL) 25 MG tablet Take 25 mg by mouth every morning.    HYDROcodone-acetaminophen (NORCO) 5-325 mg per tablet Take 1 tablet by mouth every 6 (six) hours as needed for Pain.    losartan (COZAAR) 100 MG tablet Take 1 tablet (100 mg total) by mouth once daily.    metoprolol tartrate (LOPRESSOR) 50 MG tablet Take 1 tablet (50 mg total) by mouth 2 (two) times daily.    potassium chloride SA (K-DUR,KLOR-CON) 20 MEQ tablet Take 20 mEq by mouth daily as needed.    albuterol (PROVENTIL/VENTOLIN HFA) 90 mcg/actuation inhaler  Inhale 1-2 puffs into the lungs every 6 (six) hours as needed for Wheezing. Rescue (Patient not taking: Reported on 5/23/2024)    carvediloL (COREG) 6.25 MG tablet Take 6.25 mg by mouth 2 (two) times daily. (Patient not taking: Reported on 5/23/2024)    cyanocobalamin 2,000 mcg Tab Take 2,000 mcg by mouth once daily. (Patient not taking: Reported on 5/23/2024)    hydrALAZINE (APRESOLINE) 25 MG tablet Take 25 mg by mouth every 8 (eight) hours. (Patient not taking: Reported on 5/23/2024)    senna-docusate 8.6-50 mg (PERICOLACE) 8.6-50 mg per tablet Take 1 tablet by mouth 2 (two) times a day. (Patient not taking: Reported on 5/23/2024)     No current facility-administered medications for this visit.       Review of patient's allergies indicates:   Allergen Reactions    Aspirin Nausea Only     Stomach Pain  Stomach Pain      Lisinopril Other (See Comments)     Cough  Cough      Penicillin g potassium Rash     Rash  Rash         Family History   Problem Relation Name Age of Onset    Seizures Sister      Breast cancer Mother         Social History     Socioeconomic History    Marital status: Single   Tobacco Use    Smoking status: Never    Smokeless tobacco: Never   Substance and Sexual Activity    Alcohol use: Not Currently    Drug use: Never       History of present illness:  Ms. Guevara comes in today for follow-up her right shoulder open reduction internal fixation.  She is 3 months postop.  She is working with home health physical therapy.  She reports her pain is controlled and her range of motion is progressively improving.    Review of Systems:    Musculoskeletal:  See HPI      Objective:        Physical Examination:    Vital Signs:  There were no vitals filed for this visit.    Body mass index is 43.65 kg/m².    This a well-developed, well nourished patient in no acute distress.  They are alert and oriented and cooperative to examination.        Right shoulder exam: Skin to right shoulder is clean dry and intact.  " No erythema or ecchymosis.  No signs or symptoms of infection.  Surgical incisions well healed without wound dehiscence or drainage.  She can actively forward flex to about 100°, externally rotate about 60°, and internally rotate to her sacrum.  She is neurovascularly intact throughout the right upper extremity.    Pertinent New Results:    XRAY Report / Interpretation:   Two views taken of the right shoulder today: AP and Y-view.  They reveal a healing right proximal humerus fracture without further displacement.  She has an intramedullary early placed lisbeth with cross fixed screws without evidence of hardware failure.    Assessment/Plan:      1. Status post right proximal humerus open reduction internal fixation.      We will transition home health PT to outpatient PT to continue work on range of motion and strengthening.  Her fracture is healed.  We will check her back again 2 months.    Guevara Santillan, Physician Assistant, served in the capacity as a "scribe" for this patient encounter.  A "face-to-face" encounter occurred with Dr. Luis Gonzalez on this date.  The treatment plan and medical decision-making is outlined above. Patient was seen and examined with a chaperone.     This note was created using Dragon voice recognition software that occasionally misinterpreted phrases or words.        "

## 2024-05-28 ENCOUNTER — TELEPHONE (OUTPATIENT)
Dept: ORTHOPEDICS | Facility: CLINIC | Age: 64
End: 2024-05-28

## 2024-05-28 NOTE — TELEPHONE ENCOUNTER
Received call from Tita, with [facility] . She reports we sent outpatient therapy orders, but the patient has no transportation, legs are weak, etc. Her current period of auth is until 6/27/24. Gave verbal order to continue hh until her 6/27 date.

## 2024-06-20 DIAGNOSIS — R79.89 ELEVATED PTHRP LEVEL: Primary | ICD-10-CM

## 2024-06-28 ENCOUNTER — DOCUMENT SCAN (OUTPATIENT)
Dept: HOME HEALTH SERVICES | Facility: HOSPITAL | Age: 64
End: 2024-06-28
Payer: COMMERCIAL

## 2024-07-23 ENCOUNTER — OFFICE VISIT (OUTPATIENT)
Dept: ORTHOPEDICS | Facility: CLINIC | Age: 64
End: 2024-07-23
Payer: COMMERCIAL

## 2024-07-23 ENCOUNTER — HOSPITAL ENCOUNTER (OUTPATIENT)
Dept: RADIOLOGY | Facility: HOSPITAL | Age: 64
Discharge: HOME | End: 2024-07-23
Attending: ORTHOPAEDIC SURGERY
Payer: COMMERCIAL

## 2024-07-23 VITALS — OXYGEN SATURATION: 98 % | BODY MASS INDEX: 43.63 KG/M2 | WEIGHT: 231.06 LBS | HEIGHT: 61 IN | RESPIRATION RATE: 18 BRPM

## 2024-07-23 DIAGNOSIS — M65.341 TRIGGER RING FINGER OF RIGHT HAND: ICD-10-CM

## 2024-07-23 DIAGNOSIS — M65.331 TRIGGER MIDDLE FINGER OF RIGHT HAND: ICD-10-CM

## 2024-07-23 DIAGNOSIS — R53.1 RIGHT SIDED WEAKNESS: ICD-10-CM

## 2024-07-23 DIAGNOSIS — Z98.890 HISTORY OF SHOULDER SURGERY: Primary | ICD-10-CM

## 2024-07-23 PROCEDURE — 4010F ACE/ARB THERAPY RXD/TAKEN: CPT | Mod: CPTII,S$GLB,, | Performed by: ORTHOPAEDIC SURGERY

## 2024-07-23 PROCEDURE — 1159F MED LIST DOCD IN RCRD: CPT | Mod: CPTII,S$GLB,, | Performed by: ORTHOPAEDIC SURGERY

## 2024-07-23 PROCEDURE — 1100F PTFALLS ASSESS-DOCD GE2>/YR: CPT | Mod: CPTII,S$GLB,, | Performed by: ORTHOPAEDIC SURGERY

## 2024-07-23 PROCEDURE — 73030 X-RAY EXAM OF SHOULDER: CPT | Mod: 26,RT,, | Performed by: RADIOLOGY

## 2024-07-23 PROCEDURE — 3288F FALL RISK ASSESSMENT DOCD: CPT | Mod: CPTII,S$GLB,, | Performed by: ORTHOPAEDIC SURGERY

## 2024-07-23 PROCEDURE — 99999 PR PBB SHADOW E&M-EST. PATIENT-LVL IV: CPT | Mod: PBBFAC,,, | Performed by: ORTHOPAEDIC SURGERY

## 2024-07-23 PROCEDURE — 1125F AMNT PAIN NOTED PAIN PRSNT: CPT | Mod: CPTII,S$GLB,, | Performed by: ORTHOPAEDIC SURGERY

## 2024-07-23 PROCEDURE — 3008F BODY MASS INDEX DOCD: CPT | Mod: CPTII,S$GLB,, | Performed by: ORTHOPAEDIC SURGERY

## 2024-07-23 PROCEDURE — 20550 NJX 1 TENDON SHEATH/LIGAMENT: CPT | Mod: RT,S$GLB,, | Performed by: ORTHOPAEDIC SURGERY

## 2024-07-23 PROCEDURE — 1160F RVW MEDS BY RX/DR IN RCRD: CPT | Mod: CPTII,S$GLB,, | Performed by: ORTHOPAEDIC SURGERY

## 2024-07-23 PROCEDURE — 99213 OFFICE O/P EST LOW 20 MIN: CPT | Mod: 25,S$GLB,, | Performed by: ORTHOPAEDIC SURGERY

## 2024-07-23 PROCEDURE — 73030 X-RAY EXAM OF SHOULDER: CPT | Mod: TC,PN,RT

## 2024-07-23 RX ORDER — TRIAMCINOLONE ACETONIDE 40 MG/ML
40 INJECTION, SUSPENSION INTRA-ARTICULAR; INTRAMUSCULAR
Status: DISCONTINUED | OUTPATIENT
Start: 2024-07-23 | End: 2024-07-23 | Stop reason: HOSPADM

## 2024-07-23 RX ADMIN — TRIAMCINOLONE ACETONIDE 40 MG: 40 INJECTION, SUSPENSION INTRA-ARTICULAR; INTRAMUSCULAR at 08:07

## 2024-07-23 NOTE — PROCEDURES
Tendon Sheath    Date/Time: 7/23/2024 8:45 AM    Performed by: Luis Gonzalez MD  Authorized by: Luis Gonzalez MD    Consent Done?:  Yes (Verbal)  Indications:  Pain  Site marked: the procedure site was marked    Timeout: prior to procedure the correct patient, procedure, and site was verified    Prep: patient was prepped and draped in usual sterile fashion      Location:  Long finger  Site:  R long flexor tendon sheath  Ultrasonic guidance for needle placement?: No    Needle size:  25 G  Medications:  40 mg triamcinolone acetonide 40 mg/mL  Patient tolerance:  Patient tolerated the procedure well with no immediate complications

## 2024-07-23 NOTE — PROCEDURES
Tendon Sheath    Date/Time: 7/23/2024 8:45 AM    Performed by: Luis Gonzalez MD  Authorized by: Luis Gonzalez MD    Consent Done?:  Yes (Verbal)  Indications:  Pain  Site marked: the procedure site was marked    Timeout: prior to procedure the correct patient, procedure, and site was verified    Prep: patient was prepped and draped in usual sterile fashion      Location:  Index finger  Site:  R index flexor tendon sheath  Ultrasonic guidance for needle placement?: No    Needle size:  25 G  Medications:  40 mg triamcinolone acetonide 40 mg/mL  Patient tolerance:  Patient tolerated the procedure well with no immediate complications

## 2024-07-23 NOTE — PROGRESS NOTES
[facility]    Subjective:     Chief Complaint:   Chief Complaint   Patient presents with    Right Shoulder - Pain     R prox humerus IM robin 2/26/24, has naggy pain, still has limited ROM due to pain       Past Medical History:   Diagnosis Date    A-fib     Abnormal SPEP 03/21/2024    History of arm fracture 03/19/2024    Hypertension     Monoclonal paraproteinemia 03/21/2024    Normochromic normocytic anemia 03/19/2024       Past Surgical History:   Procedure Laterality Date    HYSTERECTOMY      INSERTION OF INTRAMEDULLARY ROBIN Right 2/26/2024    Procedure: INSERTION, INTRAMEDULLARY ROBIN;  Surgeon: Luis Gonzalez MD;  Location: [facility];  Service: Orthopedics;  Laterality: Right;  Ken notified 2/26/24 ark       Current Outpatient Medications   Medication Sig    acetaminophen (TYLENOL) 650 MG TbSR Take 1 tablet (650 mg total) by mouth every 8 (eight) hours. (Patient taking differently: Take 325 mg by mouth every 8 (eight) hours.)    aspirin (ECOTRIN) 81 MG EC tablet Take 1 tablet by mouth every morning.    cloNIDine (CATAPRES) 0.1 MG tablet Take 0.1 mg by mouth every 8 (eight) hours as needed.    fluticasone propionate (FLONASE) 50 mcg/actuation nasal spray 1 spray by Nasal route once daily.    furosemide (LASIX) 40 MG tablet Take 40 mg by mouth daily as needed.    hydroCHLOROthiazide (HYDRODIURIL) 25 MG tablet Take 25 mg by mouth every morning.    HYDROcodone-acetaminophen (NORCO) 5-325 mg per tablet Take 1 tablet by mouth every 6 (six) hours as needed for Pain.    losartan (COZAAR) 100 MG tablet Take 1 tablet (100 mg total) by mouth once daily.    metoprolol tartrate (LOPRESSOR) 50 MG tablet Take 1 tablet (50 mg total) by mouth 2 (two) times daily.    potassium chloride SA (K-DUR,KLOR-CON) 20 MEQ tablet Take 20 mEq by mouth daily as needed.    albuterol (PROVENTIL/VENTOLIN HFA) 90 mcg/actuation inhaler Inhale 1-2 puffs into the lungs every 6 (six) hours as needed for Wheezing. Rescue (Patient not taking:  Reported on 5/23/2024)    carvediloL (COREG) 6.25 MG tablet Take 6.25 mg by mouth 2 (two) times daily. (Patient not taking: Reported on 5/23/2024)    cyanocobalamin 2,000 mcg Tab Take 2,000 mcg by mouth once daily. (Patient not taking: Reported on 5/23/2024)    hydrALAZINE (APRESOLINE) 25 MG tablet Take 25 mg by mouth every 8 (eight) hours. (Patient not taking: Reported on 5/23/2024)    senna-docusate 8.6-50 mg (PERICOLACE) 8.6-50 mg per tablet Take 1 tablet by mouth 2 (two) times a day. (Patient not taking: Reported on 5/23/2024)     No current facility-administered medications for this visit.       Review of patient's allergies indicates:   Allergen Reactions    Aspirin Nausea Only     Stomach Pain  Stomach Pain      Lisinopril Other (See Comments)     Cough  Cough      Penicillin g potassium Rash     Rash  Rash         Family History   Problem Relation Name Age of Onset    Seizures Sister      Breast cancer Mother         Social History     Socioeconomic History    Marital status: Single   Tobacco Use    Smoking status: Never    Smokeless tobacco: Never   Substance and Sexual Activity    Alcohol use: Not Currently    Drug use: Never       History of present illness:  64-year-old female, presents to clinic today for evaluation of the right shoulder.  She is status post open reduction internal fixation of a right proximal humerus fracture February 26.  She is doing okay.  She still has some achy right shoulder pain.      Review of Systems:    Constitution: Negative for chills, fever, and sweats.  Negative for unexplained weight loss.    HENT:  Negative for headaches and blurry vision.    Cardiovascular:Negative for chest pain or irregular heart beat. Negative for hypertension.    Respiratory:  Negative for cough and shortness of breath.    Gastrointestinal: Negative for abdominal pain, heartburn, melena, nausea, and vomitting.    Genitourinary:  Negative bladder incontinence and dysuria.    Musculoskeletal:  See  "[facility] for details.     Neurological: Negative for numbness.    Psychiatric/Behavioral: Negative for depression.  The patient is not nervous/anxious.      Endocrine: Negative for polyuria    Hematologic/Lymphatic: Negative for bleeding problem.  Does not bruise/bleed easily.    Skin: Negative for poor would healing and rash    Objective:      Physical Examination:    Vital Signs:    Vitals:    07/23/24 0833   Resp: 18       Body mass index is 43.65 kg/m².    This a well-developed, well nourished patient in no acute distress.  They are alert and oriented and cooperative to examination.        Examination of the right shoulder, patient has active range of motion, she can forward flex to about 120°.  He can externally rotate to 60°, she can internally rotate to the posterior right hip.    Examination of the right hand, she has got pain over the A1 pulley of the right long finger and ring finger with some triggering.    Pertinent New Results:    XRAY Report / Interpretation:   AP and lateral views of the right shoulder taken today in the office compared to prior images show healing of a right proximal humerus fracture with a intramedullary nail and cross fix screws in appropriate position without evidence of hardware failure or loosening.    Assessment/Plan:      History of right proximal humerus fracture status post open reduction internal fixation for 5 months ago.  She is doing well, she has got reasonably functional shoulder with minimal pain.  We are going to continue to observe this, follow up in 3 months.  For the right hand, trigger fingers of the right long and right ring fingers.  We injected both the A1 pulleys sterile technique lidocaine and triamcinolone.  If the triggering reoccurs we discussed trigger finger release.  She will follow up with us as needed.    Benedict Carlos, Physician Assistant, served in the capacity as a "scribe" for this patient encounter.  A "face-to-face" encounter occurred with Dr." Luis Gonzalez on this date.  The treatment plan and medical decision-making is outlined above. Patient was seen and examined with a chaperone.       This note was created using Dragon voice recognition software that occasionally misinterpreted phrases or words.

## 2024-10-22 ENCOUNTER — OFFICE VISIT (OUTPATIENT)
Dept: ORTHOPEDICS | Facility: CLINIC | Age: 64
End: 2024-10-22
Payer: MEDICARE

## 2024-10-22 ENCOUNTER — HOSPITAL ENCOUNTER (OUTPATIENT)
Dept: RADIOLOGY | Facility: HOSPITAL | Age: 64
Discharge: HOME | End: 2024-10-22
Attending: ORTHOPAEDIC SURGERY
Payer: MEDICARE

## 2024-10-22 VITALS — HEIGHT: 61 IN | BODY MASS INDEX: 43.63 KG/M2 | WEIGHT: 231.06 LBS

## 2024-10-22 DIAGNOSIS — Z98.890 HISTORY OF SHOULDER SURGERY: Primary | ICD-10-CM

## 2024-10-22 PROCEDURE — 99999 PR PBB SHADOW E&M-EST. PATIENT-LVL IV: CPT | Mod: PBBFAC,,, | Performed by: ORTHOPAEDIC SURGERY

## 2024-10-22 PROCEDURE — 73030 X-RAY EXAM OF SHOULDER: CPT | Mod: 26,RT,, | Performed by: RADIOLOGY

## 2024-10-22 PROCEDURE — 73030 X-RAY EXAM OF SHOULDER: CPT | Mod: TC,PN,RT

## 2024-10-22 RX ORDER — AMLODIPINE BESYLATE 5 MG/1
1 TABLET ORAL EVERY MORNING
COMMUNITY
Start: 2024-08-08 | End: 2025-08-08

## 2024-10-22 NOTE — PROGRESS NOTES
[facility]    Subjective:     Chief Complaint:   Chief Complaint   Patient presents with    Right Shoulder - Pain     Follow up for Right Proximal Humerus IM Robin 2/26/24, feels numb,ROM is good, has pain at times, sore to the touch       Past Medical History:   Diagnosis Date    A-fib     Abnormal SPEP 03/21/2024    History of arm fracture 03/19/2024    Hypertension     Monoclonal paraproteinemia 03/21/2024    Normochromic normocytic anemia 03/19/2024       Past Surgical History:   Procedure Laterality Date    HYSTERECTOMY      INSERTION OF INTRAMEDULLARY ROBIN Right 2/26/2024    Procedure: INSERTION, INTRAMEDULLARY ROBIN;  Surgeon: Luis Gonzalez MD;  Location: [facility];  Service: Orthopedics;  Laterality: Right;  Ken notified 2/26/24 ark       Current Outpatient Medications   Medication Sig    acetaminophen (TYLENOL) 650 MG TbSR Take 1 tablet (650 mg total) by mouth every 8 (eight) hours. (Patient taking differently: Take 325 mg by mouth every 8 (eight) hours.)    albuterol (PROVENTIL/VENTOLIN HFA) 90 mcg/actuation inhaler Inhale 1-2 puffs into the lungs every 6 (six) hours as needed for Wheezing. Rescue    amLODIPine (NORVASC) 5 MG tablet Take 1 tablet by mouth every morning.    aspirin (ECOTRIN) 81 MG EC tablet Take 1 tablet by mouth every morning.    carvediloL (COREG) 6.25 MG tablet Take 6.25 mg by mouth 2 (two) times daily.    cloNIDine (CATAPRES) 0.1 MG tablet Take 0.1 mg by mouth every 8 (eight) hours as needed.    cyanocobalamin 2,000 mcg Tab Take 2,000 mcg by mouth once daily.    fluticasone propionate (FLONASE) 50 mcg/actuation nasal spray 1 spray by Nasal route once daily.    furosemide (LASIX) 40 MG tablet Take 40 mg by mouth daily as needed.    hydrALAZINE (APRESOLINE) 25 MG tablet Take 25 mg by mouth every 8 (eight) hours.    hydroCHLOROthiazide (HYDRODIURIL) 25 MG tablet Take 25 mg by mouth every morning.    HYDROcodone-acetaminophen (NORCO) 5-325 mg per tablet Take 1 tablet by mouth every 6  (six) hours as needed for Pain.    losartan (COZAAR) 100 MG tablet Take 1 tablet (100 mg total) by mouth once daily.    metoprolol tartrate (LOPRESSOR) 50 MG tablet Take 1 tablet (50 mg total) by mouth 2 (two) times daily.    potassium chloride SA (K-DUR,KLOR-CON) 20 MEQ tablet Take 20 mEq by mouth daily as needed.    senna-docusate 8.6-50 mg (PERICOLACE) 8.6-50 mg per tablet Take 1 tablet by mouth 2 (two) times a day.     No current facility-administered medications for this visit.       Review of patient's allergies indicates:   Allergen Reactions    Aspirin Nausea Only     Stomach Pain  Stomach Pain      Lisinopril Other (See Comments)     Cough  Cough      Penicillin g potassium Rash     Rash  Rash         Family History   Problem Relation Name Age of Onset    Seizures Sister      Breast cancer Mother         Social History     Socioeconomic History    Marital status: Single   Tobacco Use    Smoking status: Never    Smokeless tobacco: Never   Substance and Sexual Activity    Alcohol use: Not Currently    Drug use: Never       History of present illness:  Ms. Guevara comes in today for follow-up for her right proximal humerus fracture open reduction internal fixation.  She is about 8 months postop at this stage.  She has completed her formal physical therapy.  She actually has well-preserved range of motion.    Review of Systems:    Constitution: Negative for chills, fever, and sweats.  Negative for unexplained weight loss.    HENT:  Negative for headaches and blurry vision.    Cardiovascular:Negative for chest pain or irregular heart beat. Negative for hypertension.    Respiratory:  Negative for cough and shortness of breath.    Gastrointestinal: Negative for abdominal pain, heartburn, melena, nausea, and vomitting.    Genitourinary:  Negative bladder incontinence and dysuria.    Musculoskeletal:  See HPI for details.     Neurological: Negative for numbness.    Psychiatric/Behavioral: Negative for depression.   "The patient is not nervous/anxious.      Endocrine: Negative for polyuria    Hematologic/Lymphatic: Negative for bleeding problem.  Does not bruise/bleed easily.    Skin: Negative for poor would healing and rash    Objective:      Physical Examination:    Vital Signs:  There were no vitals filed for this visit.    Body mass index is 43.65 kg/m².    This a well-developed, well nourished patient in no acute distress.  They are alert and oriented and cooperative to examination.        Right shoulder exam:  She can forward flex the right shoulder actively to about 170, externally rotate about 60°, internally rotate to perhaps the sacrum.  She is neurovascularly intact throughout the right upper extremity.      Pertinent New Results:    XRAY Report / Interpretation:   Two views taken of the right shoulder today: AP and Y-view.  No acute fractures or dislocations seen.  She has an intramedullary placed proximal humerus nail with cross fixed screws without evidence of hardware failure or subsidence.  Previous fracture well healed.    Assessment/Plan:      1. Status post right proximal humerus open reduction internal fixation.  .    In regards to her right shoulder, she can resume/continue her regular activities of daily living as pain tolerates.  She does have trigger fingers on her right index, middle, and ring fingers.  Her middle and ring fingers were injected on the last visit a few months ago.  We did discuss with her A1 pulley release of these digits her leisure.  She also complains of some bilateral lower extremity weakness.  We will get her set up with Dr. Higgins to evaluate her lumbar spine.  She can follow up with us on an as-needed basis for any issues or concerns that arise or she would like to proceed with surgical intervention for her trigger fingers.    Guevara Santillan, Physician Assistant, served in the capacity as a "scribe" for this patient encounter.  A "face-to-face" encounter occurred with Dr." Luis Gonzalez on this date.  The treatment plan and medical decision-making is outlined above. Patient was seen and examined with a chaperone.       This note was created using Dragon voice recognition software that occasionally misinterpreted phrases or words.

## 2024-11-06 ENCOUNTER — OFFICE VISIT (OUTPATIENT)
Dept: ORTHOPEDICS | Facility: CLINIC | Age: 64
End: 2024-11-06
Payer: MEDICARE

## 2024-11-06 ENCOUNTER — HOSPITAL ENCOUNTER (OUTPATIENT)
Dept: RADIOLOGY | Facility: HOSPITAL | Age: 64
Discharge: HOME | End: 2024-11-06
Attending: ORTHOPAEDIC SURGERY
Payer: MEDICARE

## 2024-11-06 VITALS — HEIGHT: 61 IN | WEIGHT: 235 LBS | BODY MASS INDEX: 44.37 KG/M2

## 2024-11-06 DIAGNOSIS — R29.898 RIGHT LEG WEAKNESS: ICD-10-CM

## 2024-11-06 DIAGNOSIS — M51.360 DEGENERATION OF INTERVERTEBRAL DISC OF LUMBAR REGION WITH DISCOGENIC BACK PAIN: Primary | ICD-10-CM

## 2024-11-06 DIAGNOSIS — M43.16 SPONDYLOLISTHESIS OF LUMBAR REGION: ICD-10-CM

## 2024-11-06 DIAGNOSIS — M47.816 FACET ARTHRITIS OF LUMBAR REGION: ICD-10-CM

## 2024-11-06 PROCEDURE — 99999 PR PBB SHADOW E&M-EST. PATIENT-LVL III: CPT | Mod: PBBFAC,,, | Performed by: ORTHOPAEDIC SURGERY

## 2024-11-06 PROCEDURE — 1160F RVW MEDS BY RX/DR IN RCRD: CPT | Mod: CPTII,S$GLB,, | Performed by: ORTHOPAEDIC SURGERY

## 2024-11-06 PROCEDURE — 72100 X-RAY EXAM L-S SPINE 2/3 VWS: CPT | Mod: 26,,, | Performed by: RADIOLOGY

## 2024-11-06 PROCEDURE — 72170 X-RAY EXAM OF PELVIS: CPT | Mod: 26,,, | Performed by: RADIOLOGY

## 2024-11-06 PROCEDURE — 1101F PT FALLS ASSESS-DOCD LE1/YR: CPT | Mod: CPTII,S$GLB,, | Performed by: ORTHOPAEDIC SURGERY

## 2024-11-06 PROCEDURE — 4010F ACE/ARB THERAPY RXD/TAKEN: CPT | Mod: CPTII,S$GLB,, | Performed by: ORTHOPAEDIC SURGERY

## 2024-11-06 PROCEDURE — 3044F HG A1C LEVEL LT 7.0%: CPT | Mod: CPTII,S$GLB,, | Performed by: ORTHOPAEDIC SURGERY

## 2024-11-06 PROCEDURE — 3288F FALL RISK ASSESSMENT DOCD: CPT | Mod: CPTII,S$GLB,, | Performed by: ORTHOPAEDIC SURGERY

## 2024-11-06 PROCEDURE — 1126F AMNT PAIN NOTED NONE PRSNT: CPT | Mod: CPTII,S$GLB,, | Performed by: ORTHOPAEDIC SURGERY

## 2024-11-06 PROCEDURE — 99213 OFFICE O/P EST LOW 20 MIN: CPT | Mod: S$GLB,,, | Performed by: ORTHOPAEDIC SURGERY

## 2024-11-06 PROCEDURE — 72100 X-RAY EXAM L-S SPINE 2/3 VWS: CPT | Mod: TC,PN

## 2024-11-06 PROCEDURE — 72170 X-RAY EXAM OF PELVIS: CPT | Mod: TC,PN

## 2024-11-06 PROCEDURE — 3008F BODY MASS INDEX DOCD: CPT | Mod: CPTII,S$GLB,, | Performed by: ORTHOPAEDIC SURGERY

## 2024-11-06 PROCEDURE — 1159F MED LIST DOCD IN RCRD: CPT | Mod: CPTII,S$GLB,, | Performed by: ORTHOPAEDIC SURGERY

## 2024-11-06 RX ORDER — METOPROLOL TARTRATE 25 MG/1
TABLET, FILM COATED ORAL
COMMUNITY
Start: 2024-10-29

## 2024-11-06 NOTE — PROGRESS NOTES
Subjective:       Patient ID: Paola Ibanez is a 64 y.o. female.    Chief Complaint: Pain of the Lumbar Spine (Denies Lumbar pain/ RT leg weakness/NP to Dr Higgins, x 3 years, c/o tired ache, no right leg. Numbness and tingling to right leg, weakness to right leg, no incontinence, limited standing, walking and bending, Denies Lumbar pain)      History of Present Illness    Prior to meeting with the patient I reviewed the medical chart in [facility]. This included reviewing the previous progress notes from our office, review of the patient's last appointment with their primary care provider, review of any visits to the emergency room, and review of any pain management appointments or procedures.   History of back pain and weakness in the right lower extremity.  Has difficulty lifting the right foot up.  No bowel or bladder incontinence.  This see Dr. Desir a year and a half ago for neck pain and tingling in the her arms and surgery was recommended but she declined.    Current Medications  Current Outpatient Medications   Medication Sig Dispense Refill    acetaminophen (TYLENOL) 650 MG TbSR Take 1 tablet (650 mg total) by mouth every 8 (eight) hours. 30 tablet 0    albuterol (PROVENTIL/VENTOLIN HFA) 90 mcg/actuation inhaler Inhale 1-2 puffs into the lungs every 6 (six) hours as needed for Wheezing. Rescue 18 g 0    amLODIPine (NORVASC) 5 MG tablet Take 1 tablet by mouth every morning.      aspirin (ECOTRIN) 81 MG EC tablet Take 1 tablet by mouth every morning.      carvediloL (COREG) 6.25 MG tablet Take 6.25 mg by mouth 2 (two) times daily.      cloNIDine (CATAPRES) 0.1 MG tablet Take 0.1 mg by mouth every 8 (eight) hours as needed.      cyanocobalamin 2,000 mcg Tab Take 2,000 mcg by mouth once daily. 30 tablet 5    fluticasone propionate (FLONASE) 50 mcg/actuation nasal spray 1 spray by Nasal route once daily.      furosemide (LASIX) 40 MG tablet Take 40 mg by mouth daily as needed.      hydrALAZINE (APRESOLINE) 25 MG  tablet Take 25 mg by mouth every 8 (eight) hours.      hydroCHLOROthiazide (HYDRODIURIL) 25 MG tablet Take 25 mg by mouth every morning.      HYDROcodone-acetaminophen (NORCO) 5-325 mg per tablet Take 1 tablet by mouth every 6 (six) hours as needed for Pain. 30 tablet 0    losartan (COZAAR) 100 MG tablet Take 1 tablet (100 mg total) by mouth once daily. 30 tablet 5    metoprolol tartrate (LOPRESSOR) 25 MG tablet SMARTSI Tablet(s) By Mouth Morning-Night      potassium chloride SA (K-DUR,KLOR-CON) 20 MEQ tablet Take 20 mEq by mouth daily as needed.      senna-docusate 8.6-50 mg (PERICOLACE) 8.6-50 mg per tablet Take 1 tablet by mouth 2 (two) times a day. 60 tablet 1     No current facility-administered medications for this visit.       Allergies  Review of patient's allergies indicates:   Allergen Reactions    Aspirin Nausea Only     Stomach Pain  Stomach Pain      Lisinopril Other (See Comments)     Cough  Cough      Penicillin g potassium Rash     Rash  Rash         Past Medical History  Past Medical History:   Diagnosis Date    A-fib     Abnormal SPEP 2024    History of arm fracture 2024    Hypertension     Monoclonal paraproteinemia 2024    Normochromic normocytic anemia 2024       Surgical History  Past Surgical History:   Procedure Laterality Date    HYSTERECTOMY      INSERTION OF INTRAMEDULLARY ROBIN Right 2024    Procedure: INSERTION, INTRAMEDULLARY ROBIN;  Surgeon: Luis Gonzalez MD;  Location: [facility];  Service: Orthopedics;  Laterality: Right;  Ken notified 24 ark       Family History:   Family History   Problem Relation Name Age of Onset    Seizures Sister      Breast cancer Mother         Social History:   Social History     Socioeconomic History    Marital status: Single   Tobacco Use    Smoking status: Never    Smokeless tobacco: Never   Substance and Sexual Activity    Alcohol use: Not Currently    Drug use: Never       Hospitalization/Major Diagnostic Procedure:      Review of Systems     General/Constitutional:  Chills denies. Fatigue denies. Fever denies. Weight gain denies. Weight loss denies.    Respiratory:  Shortness of breath denies.    Cardiovascular:  Chest pain denies.    Gastrointestinal:  Constipation denies. Diarrhea denies. Nausea denies. Vomiting denies.     Hematology:  Easy bruising denies. Prolonged bleeding denies.     Genitourinary:  Frequent urination denies. Pain in lower back denies. Painful urination denies.     Musculoskeletal:  See HPI for details    Skin:  Rash denies.    Neurologic:  Dizziness denies. Gait abnormalities denies. Seizures denies. Tingling/Numbess denies.    Psychiatric:  Anxiety denies. Depressed mood denies.     Objective:   Vital Signs: There were no vitals filed for this visit.     Physical Exam      General Examination:     Constitutional: The patient is alert and oriented to lace person and time. Mood is pleasant.     Head/Face: Normal facial features normal eyebrows    Eyes: Normal extraocular motion bilaterally    Lungs: Respirations are equal and unlabored    Gait is coordinated.    Cardiovascular: There are no swelling or varicosities present.    Lymphatic: Negative for adenopathy    Skin: Normal    Neurological: Level of consciousness normal. Oriented to place person and time and situation    Psychiatric: Oriented to time place person and situation    Cervical range of motion mildly restricted Manny test negative subjective numbness C5-6 nerve root distribution bilaterally.  Lumbar range of motion moderate restricted straight leg raising causes back pain grade 3/5 strain of right hip flexors grade 1 of 5 strength right ankle dorsiflexor.  Grade 4 5 strength right ankle plantar flexors    XRAY Report/ Interpretation :  Prior cervical MRI was reviewed please see report for details demonstrates evidence of a disc degeneration stenosis at 2 levels.  AP and lateral x-rays of lumbar spine will performed today. Indications low  back pain. Findings:  Moderate degenerative changes L5-S1 degenerative spondylolisthesis L4-5 multilevel facet joint arthropathy  AP pelvis x-ray was taken today. Indications low back pain and/or hip pain. Findings AP pelvis x-ray appears to be normal with no evidence of fractures or significant degenerative disease      Assessment:       1. Degeneration of intervertebral disc of lumbar region with discogenic back pain    2. Right leg weakness    3. Spondylolisthesis of lumbar region    4. Facet arthritis of lumbar region        Plan:       Paola was seen today for pain.    Diagnoses and all orders for this visit:    Degeneration of intervertebral disc of lumbar region with discogenic back pain  -     X-Ray Lumbar Spine Ap And Lateral  -     X-Ray Pelvis Routine AP  -     MRI Lumbar Spine Without Contrast; Future    Right leg weakness  -     X-Ray Lumbar Spine Ap And Lateral  -     X-Ray Pelvis Routine AP  -     MRI Lumbar Spine Without Contrast; Future    Spondylolisthesis of lumbar region  -     MRI Lumbar Spine Without Contrast; Future    Facet arthritis of lumbar region         Follow up for 2 week f/u - lumbar MRI results.    Lumbar MRI is recommended at this time.  I believe her lower extremity weakness on right side rate to a lumbar condition she does have some upper extremity hyperreflexia and objective findings but I do not think it is related to her lower extremity weakness.  I think the 2 separate issues and that has been explained.  Return after lumbar MRI  Treatment options were discussed with regards to the nature of the medical condition. Conservative pain intervention and surgical options were discussed in detail. The probability of success of each separate treatment option was discussed. The patient expressed a clear understanding of the treatment options. With regards to surgery, the procedure risk, benefits, complications, and outcomes were discussed. No guarantees were given with regards to  surgical outcome.   The risk of complications, morbidity, and mortality of patient management decisions have been made at the time of this visit. These are associated with the patient's problems, diagnostic procedures and treatment options. This includes the possible management options selected and those considered but not selected by the patient after shared medical decision making we discussed with the patient.     This note was created using Dragon voice recognition software that occasionally misinterpreted phrases or words.

## 2024-11-15 ENCOUNTER — HOSPITAL ENCOUNTER (OUTPATIENT)
Dept: RADIOLOGY | Facility: HOSPITAL | Age: 64
Discharge: HOME | End: 2024-11-15
Attending: ORTHOPAEDIC SURGERY
Payer: MEDICARE

## 2024-11-15 DIAGNOSIS — R29.898 RIGHT LEG WEAKNESS: ICD-10-CM

## 2024-11-15 DIAGNOSIS — M43.16 SPONDYLOLISTHESIS OF LUMBAR REGION: ICD-10-CM

## 2024-11-15 DIAGNOSIS — M51.360 DEGENERATION OF INTERVERTEBRAL DISC OF LUMBAR REGION WITH DISCOGENIC BACK PAIN: ICD-10-CM

## 2024-11-15 PROCEDURE — 72148 MRI LUMBAR SPINE W/O DYE: CPT | Mod: TC,PO

## 2024-11-15 PROCEDURE — 72148 MRI LUMBAR SPINE W/O DYE: CPT | Mod: 26,,, | Performed by: RADIOLOGY

## 2024-11-20 ENCOUNTER — OFFICE VISIT (OUTPATIENT)
Dept: ORTHOPEDICS | Facility: CLINIC | Age: 64
End: 2024-11-20
Payer: MEDICARE

## 2024-11-20 VITALS — WEIGHT: 235 LBS | BODY MASS INDEX: 44.37 KG/M2 | HEIGHT: 61 IN

## 2024-11-20 DIAGNOSIS — M47.816 FACET ARTHRITIS OF LUMBAR REGION: ICD-10-CM

## 2024-11-20 DIAGNOSIS — R29.898 RIGHT LEG WEAKNESS: ICD-10-CM

## 2024-11-20 DIAGNOSIS — M43.16 SPONDYLOLISTHESIS OF LUMBAR REGION: Primary | ICD-10-CM

## 2024-11-20 DIAGNOSIS — M51.360 DEGENERATION OF INTERVERTEBRAL DISC OF LUMBAR REGION WITH DISCOGENIC BACK PAIN: ICD-10-CM

## 2024-11-20 PROCEDURE — 4010F ACE/ARB THERAPY RXD/TAKEN: CPT | Mod: CPTII,S$GLB,, | Performed by: ORTHOPAEDIC SURGERY

## 2024-11-20 PROCEDURE — 99999 PR PBB SHADOW E&M-EST. PATIENT-LVL III: CPT | Mod: PBBFAC,,, | Performed by: ORTHOPAEDIC SURGERY

## 2024-11-20 PROCEDURE — 3288F FALL RISK ASSESSMENT DOCD: CPT | Mod: CPTII,S$GLB,, | Performed by: ORTHOPAEDIC SURGERY

## 2024-11-20 PROCEDURE — 3008F BODY MASS INDEX DOCD: CPT | Mod: CPTII,S$GLB,, | Performed by: ORTHOPAEDIC SURGERY

## 2024-11-20 PROCEDURE — 1160F RVW MEDS BY RX/DR IN RCRD: CPT | Mod: CPTII,S$GLB,, | Performed by: ORTHOPAEDIC SURGERY

## 2024-11-20 PROCEDURE — 1101F PT FALLS ASSESS-DOCD LE1/YR: CPT | Mod: CPTII,S$GLB,, | Performed by: ORTHOPAEDIC SURGERY

## 2024-11-20 PROCEDURE — 3044F HG A1C LEVEL LT 7.0%: CPT | Mod: CPTII,S$GLB,, | Performed by: ORTHOPAEDIC SURGERY

## 2024-11-20 PROCEDURE — 99213 OFFICE O/P EST LOW 20 MIN: CPT | Mod: S$GLB,,, | Performed by: ORTHOPAEDIC SURGERY

## 2024-11-20 PROCEDURE — 1159F MED LIST DOCD IN RCRD: CPT | Mod: CPTII,S$GLB,, | Performed by: ORTHOPAEDIC SURGERY

## 2024-11-20 PROCEDURE — 1125F AMNT PAIN NOTED PAIN PRSNT: CPT | Mod: CPTII,S$GLB,, | Performed by: ORTHOPAEDIC SURGERY

## 2024-11-20 NOTE — PROGRESS NOTES
Subjective:       Patient ID: Paola Ibanez is a 64 y.o. female.    Chief Complaint: Pain of the Lumbar Spine (Patient is here for MRI results of her Lumbar Spine, states her pain is worse than her last visit)      History of Present Illness    Prior to meeting with the patient I reviewed the medical chart in [facility]. This included reviewing the previous progress notes from our office, review of the patient's last appointment with their primary care provider, review of any visits to the emergency room, and review of any pain management appointments or procedures.   Comes in today for follow-up for her lumbar spine and right lower extremity radicular symptoms.  She has had her lumbar spine MRI.  Comes in today with those results for review.  She does not really report any back pain.  Primarily right lower extremity symptoms.  She complains of weakness and paresthesias.  They paresthesias extending the entirety of the lower extremity into the foot.    Current Medications  Current Outpatient Medications   Medication Sig Dispense Refill    acetaminophen (TYLENOL) 650 MG TbSR Take 1 tablet (650 mg total) by mouth every 8 (eight) hours. 30 tablet 0    albuterol (PROVENTIL/VENTOLIN HFA) 90 mcg/actuation inhaler Inhale 1-2 puffs into the lungs every 6 (six) hours as needed for Wheezing. Rescue 18 g 0    amLODIPine (NORVASC) 5 MG tablet Take 1 tablet by mouth every morning.      aspirin (ECOTRIN) 81 MG EC tablet Take 1 tablet by mouth every morning.      carvediloL (COREG) 6.25 MG tablet Take 6.25 mg by mouth 2 (two) times daily.      cloNIDine (CATAPRES) 0.1 MG tablet Take 0.1 mg by mouth every 8 (eight) hours as needed.      cyanocobalamin 2,000 mcg Tab Take 2,000 mcg by mouth once daily. 30 tablet 5    fluticasone propionate (FLONASE) 50 mcg/actuation nasal spray 1 spray by Nasal route once daily.      furosemide (LASIX) 40 MG tablet Take 40 mg by mouth daily as needed.      hydroCHLOROthiazide (HYDRODIURIL) 25 MG tablet  Take 25 mg by mouth every morning.      HYDROcodone-acetaminophen (NORCO) 5-325 mg per tablet Take 1 tablet by mouth every 6 (six) hours as needed for Pain. 30 tablet 0    losartan (COZAAR) 100 MG tablet Take 1 tablet (100 mg total) by mouth once daily. 30 tablet 5    metoprolol tartrate (LOPRESSOR) 25 MG tablet SMARTSI Tablet(s) By Mouth Morning-Night      potassium chloride SA (K-DUR,KLOR-CON) 20 MEQ tablet Take 20 mEq by mouth daily as needed.      senna-docusate 8.6-50 mg (PERICOLACE) 8.6-50 mg per tablet Take 1 tablet by mouth 2 (two) times a day. 60 tablet 1    hydrALAZINE (APRESOLINE) 25 MG tablet Take 25 mg by mouth every 8 (eight) hours. (Patient not taking: Reported on 2024)       No current facility-administered medications for this visit.       Allergies  Review of patient's allergies indicates:   Allergen Reactions    Aspirin Nausea Only     Stomach Pain  Stomach Pain      Lisinopril Other (See Comments)     Cough  Cough      Penicillin g potassium Rash     Rash  Rash         Past Medical History  Past Medical History:   Diagnosis Date    A-fib     Abnormal SPEP 2024    History of arm fracture 2024    Hypertension     Monoclonal paraproteinemia 2024    Normochromic normocytic anemia 2024       Surgical History  Past Surgical History:   Procedure Laterality Date    HYSTERECTOMY      INSERTION OF INTRAMEDULLARY ROBIN Right 2024    Procedure: INSERTION, INTRAMEDULLARY ROBIN;  Surgeon: Luis Gonzalez MD;  Location: [facility];  Service: Orthopedics;  Laterality: Right;  Ken notified 24 jhonatan       Family History:   Family History   Problem Relation Name Age of Onset    Seizures Sister      Breast cancer Mother         Social History:   Social History     Socioeconomic History    Marital status: Single   Tobacco Use    Smoking status: Never    Smokeless tobacco: Never   Substance and Sexual Activity    Alcohol use: Not Currently    Drug use: Never        Hospitalization/Major Diagnostic Procedure:     Review of Systems     General/Constitutional:  Chills denies. Fatigue denies. Fever denies. Weight gain denies. Weight loss denies.    Respiratory:  Shortness of breath denies.    Cardiovascular:  Chest pain denies.    Gastrointestinal:  Constipation denies. Diarrhea denies. Nausea denies. Vomiting denies.     Hematology:  Easy bruising denies. Prolonged bleeding denies.     Genitourinary:  Frequent urination denies. Pain in lower back denies. Painful urination denies.     Musculoskeletal:  See HPI for details    Skin:  Rash denies.    Neurologic:  Dizziness denies. Gait abnormalities denies. Seizures denies. Tingling/Numbess denies.    Psychiatric:  Anxiety denies. Depressed mood denies.     Objective:   Vital Signs: There were no vitals filed for this visit.     Physical Exam      General Examination:     Constitutional: The patient is alert and oriented to lace person and time. Mood is pleasant.     Head/Face: Normal facial features normal eyebrows    Eyes: Normal extraocular motion bilaterally    Lungs: Respirations are equal and unlabored    Gait is coordinated.    Cardiovascular: There are no swelling or varicosities present.    Lymphatic: Negative for adenopathy    Skin: Normal    Neurological: Level of consciousness normal. Oriented to place person and time and situation    Psychiatric: Oriented to time place person and situation    Lumbar exam:  Patient presents today ambulating via wheelchair. Lumbar range of motion moderately restricted.  Her straight leg raising maneuver causes back pain only.  No radicular symptoms.  Grade 3/5 strain of right hip flexors. Grade 1/5 strength right ankle dorsiflexor. Grade 4/5 strength right ankle plantar flexors.    XRAY Report/ Interpretation:  No new radiographs taken on today's clinic visit.  Did review images and report of her lumbar spine MRI which does reveal multilevel degenerative changes throughout the lumbar  spine worse at L3-4, L4-5, and L5-S1 with varying degrees of stenosis.  She seems to be more pronounced at L4-5 on MRI.  She does have mild anterolisthesis at L4 on L5.    Assessment:       1. Spondylolisthesis of lumbar region    2. Degeneration of intervertebral disc of lumbar region with discogenic back pain    3. Right leg weakness    4. Facet arthritis of lumbar region        Plan:       Paola was seen today for pain.    Diagnoses and all orders for this visit:    Spondylolisthesis of lumbar region  -     Ambulatory referral/consult to Pain Clinic; Future    Degeneration of intervertebral disc of lumbar region with discogenic back pain  -     Ambulatory referral/consult to Pain Clinic; Future    Right leg weakness  -     Ambulatory referral/consult to Pain Clinic; Future    Facet arthritis of lumbar region         Follow up for 2 month f/u - lumbar pain/radiculopathy.  This is to attest that the medical assistant, Roberto Santillan served in the capacity as a scribe for this patient's encounter.  This is also verify that I have reviewed the patient's history and  formulated the treatment plan for this patient.  I have  evaluated this patient  and formulated a treatment plan for this patient visit.  The treatment plan and medical decision-making is outlined below  Treatment options discussed with her and her son today.  Plan is to refer to pain management for evaluation and treatment for possible epidural steroid injection.  We will see her back in 2 months to see how she responds to the treatment modalities.      Treatment options were discussed with regards to the nature of the medical condition. Conservative pain intervention and surgical options were discussed in detail. The probability of success of each separate treatment option was discussed. The patient expressed a clear understanding of the treatment options. With regards to surgery, the procedure risk, benefits, complications, and outcomes were  discussed. No guarantees were given with regards to surgical outcome.   The risk of complications, morbidity, and mortality of patient management decisions have been made at the time of this visit. These are associated with the patient's problems, diagnostic procedures and treatment options. This includes the possible management options selected and those considered but not selected by the patient after shared medical decision making we discussed with the patient.     This note was created using Dragon voice recognition software that occasionally misinterpreted phrases or words.

## 2024-11-20 NOTE — PROGRESS NOTES
Subjective:       Patient ID: Paola Ibanez is a 64 y.o. female.    Chief Complaint: Pain of the Lumbar Spine (Patient is here for MRI results of her Lumbar Spine, states her pain is worse than her last visit)      History of Present Illness    Prior to meeting with the patient I reviewed the medical chart in [facility]. This included reviewing the previous progress notes from our office, review of the patient's last appointment with their primary care provider, review of any visits to the emergency room, and review of any pain management appointments or procedures.   ***    Current Medications  Current Outpatient Medications   Medication Sig Dispense Refill    acetaminophen (TYLENOL) 650 MG TbSR Take 1 tablet (650 mg total) by mouth every 8 (eight) hours. 30 tablet 0    albuterol (PROVENTIL/VENTOLIN HFA) 90 mcg/actuation inhaler Inhale 1-2 puffs into the lungs every 6 (six) hours as needed for Wheezing. Rescue 18 g 0    amLODIPine (NORVASC) 5 MG tablet Take 1 tablet by mouth every morning.      aspirin (ECOTRIN) 81 MG EC tablet Take 1 tablet by mouth every morning.      carvediloL (COREG) 6.25 MG tablet Take 6.25 mg by mouth 2 (two) times daily.      cloNIDine (CATAPRES) 0.1 MG tablet Take 0.1 mg by mouth every 8 (eight) hours as needed.      cyanocobalamin 2,000 mcg Tab Take 2,000 mcg by mouth once daily. 30 tablet 5    fluticasone propionate (FLONASE) 50 mcg/actuation nasal spray 1 spray by Nasal route once daily.      furosemide (LASIX) 40 MG tablet Take 40 mg by mouth daily as needed.      hydroCHLOROthiazide (HYDRODIURIL) 25 MG tablet Take 25 mg by mouth every morning.      HYDROcodone-acetaminophen (NORCO) 5-325 mg per tablet Take 1 tablet by mouth every 6 (six) hours as needed for Pain. 30 tablet 0    losartan (COZAAR) 100 MG tablet Take 1 tablet (100 mg total) by mouth once daily. 30 tablet 5    metoprolol tartrate (LOPRESSOR) 25 MG tablet SMARTSI Tablet(s) By Mouth Morning-Night      potassium chloride SA  (K-DUR,KLOR-CON) 20 MEQ tablet Take 20 mEq by mouth daily as needed.      senna-docusate 8.6-50 mg (PERICOLACE) 8.6-50 mg per tablet Take 1 tablet by mouth 2 (two) times a day. 60 tablet 1    hydrALAZINE (APRESOLINE) 25 MG tablet Take 25 mg by mouth every 8 (eight) hours. (Patient not taking: Reported on 11/20/2024)       No current facility-administered medications for this visit.       Allergies  Review of patient's allergies indicates:   Allergen Reactions    Aspirin Nausea Only     Stomach Pain  Stomach Pain      Lisinopril Other (See Comments)     Cough  Cough      Penicillin g potassium Rash     Rash  Rash         Past Medical History  Past Medical History:   Diagnosis Date    A-fib     Abnormal SPEP 03/21/2024    History of arm fracture 03/19/2024    Hypertension     Monoclonal paraproteinemia 03/21/2024    Normochromic normocytic anemia 03/19/2024       Surgical History  Past Surgical History:   Procedure Laterality Date    HYSTERECTOMY      INSERTION OF INTRAMEDULLARY ROBIN Right 2/26/2024    Procedure: INSERTION, INTRAMEDULLARY ROBIN;  Surgeon: uLis Gonzalez MD;  Location: [facility];  Service: Orthopedics;  Laterality: Right;  Ken notified 2/26/24 ark       Family History:   Family History   Problem Relation Name Age of Onset    Seizures Sister      Breast cancer Mother         Social History:   Social History     Socioeconomic History    Marital status: Single   Tobacco Use    Smoking status: Never    Smokeless tobacco: Never   Substance and Sexual Activity    Alcohol use: Not Currently    Drug use: Never       Hospitalization/Major Diagnostic Procedure:     Review of Systems     General/Constitutional:  Chills denies. Fatigue denies. Fever denies. Weight gain denies. Weight loss denies.    Respiratory:  Shortness of breath denies.    Cardiovascular:  Chest pain denies.    Gastrointestinal:  Constipation denies. Diarrhea denies. Nausea denies. Vomiting denies.     Hematology:  Easy bruising denies. Prolonged  bleeding denies.     Genitourinary:  Frequent urination denies. Pain in lower back denies. Painful urination denies.     Musculoskeletal:  See HPI for details    Skin:  Rash denies.    Neurologic:  Dizziness denies. Gait abnormalities denies. Seizures denies. Tingling/Numbess denies.    Psychiatric:  Anxiety denies. Depressed mood denies.     Objective:   Vital Signs: There were no vitals filed for this visit.     Physical Exam      General Examination:     Constitutional: The patient is alert and oriented to lace person and time. Mood is pleasant.     Head/Face: Normal facial features normal eyebrows    Eyes: Normal extraocular motion bilaterally    Lungs: Respirations are equal and unlabored    Gait is coordinated.    Cardiovascular: There are no swelling or varicosities present.    Lymphatic: Negative for adenopathy    Skin: Normal    Neurological: Level of consciousness normal. Oriented to place person and time and situation    Psychiatric: Oriented to time place person and situation    ***  XRAY Report/ Interpretation:***      Assessment:       No diagnosis found.    Plan:       There are no diagnoses linked to this encounter.     No follow-ups on file.    ***  Treatment options were discussed with regards to the nature of the medical condition. Conservative pain intervention and surgical options were discussed in detail. The probability of success of each separate treatment option was discussed. The patient expressed a clear understanding of the treatment options. With regards to surgery, the procedure risk, benefits, complications, and outcomes were discussed. No guarantees were given with regards to surgical outcome.   The risk of complications, morbidity, and mortality of patient management decisions have been made at the time of this visit. These are associated with the patient's problems, diagnostic procedures and treatment options. This includes the possible management options selected and those  considered but not selected by the patient after shared medical decision making we discussed with the patient.     This note was created using Dragon voice recognition software that occasionally misinterpreted phrases or words.

## 2024-12-05 ENCOUNTER — OFFICE VISIT (OUTPATIENT)
Dept: PAIN MEDICINE | Facility: CLINIC | Age: 64
End: 2024-12-05
Payer: MEDICARE

## 2024-12-05 ENCOUNTER — TELEPHONE (OUTPATIENT)
Dept: PAIN MEDICINE | Facility: CLINIC | Age: 64
End: 2024-12-05

## 2024-12-05 VITALS
HEIGHT: 61 IN | DIASTOLIC BLOOD PRESSURE: 100 MMHG | WEIGHT: 218 LBS | HEART RATE: 73 BPM | BODY MASS INDEX: 41.16 KG/M2 | SYSTOLIC BLOOD PRESSURE: 180 MMHG

## 2024-12-05 DIAGNOSIS — M54.17 LUMBOSACRAL RADICULOPATHY: Primary | ICD-10-CM

## 2024-12-05 DIAGNOSIS — M21.371 FOOT DROP, RIGHT: ICD-10-CM

## 2024-12-05 DIAGNOSIS — M43.16 SPONDYLOLISTHESIS OF LUMBAR REGION: ICD-10-CM

## 2024-12-05 DIAGNOSIS — M51.360 DEGENERATION OF INTERVERTEBRAL DISC OF LUMBAR REGION WITH DISCOGENIC BACK PAIN: ICD-10-CM

## 2024-12-05 DIAGNOSIS — M51.362 DEGENERATION OF INTERVERTEBRAL DISC OF LUMBAR REGION WITH DISCOGENIC BACK PAIN AND LOWER EXTREMITY PAIN: ICD-10-CM

## 2024-12-05 DIAGNOSIS — R29.898 RIGHT LEG WEAKNESS: ICD-10-CM

## 2024-12-05 PROCEDURE — 99999 PR PBB SHADOW E&M-EST. PATIENT-LVL III: CPT | Mod: PBBFAC,,, | Performed by: STUDENT IN AN ORGANIZED HEALTH CARE EDUCATION/TRAINING PROGRAM

## 2024-12-05 NOTE — H&P (VIEW-ONLY)
Interventional Pain Clinic    Referred by:   Brenton Higgins MD    PCP:   May Rees MD    CHIEF COMPLAINT:   Right leg pain and weakness    HISTORY OF PRESENT ILLNESS:   Paola Ibanez presents for evaluation of chronic right lower extremity weakness and pain as well as bilateral lower extremity heaviness when trying to ambulate.  She reports these symptoms began a few years ago without known inciting event.  She says she drags her right leg because she is unable to pick it up.  She asked to use a walker and relies on her upper extremities to support her during gait.  She endorses paresthesias down the right leg to the foot as well.  She denies significant axial component to her pain, and says that she only sometimes feels pain in the lower back.  As she walks, her legs get progressively heavier which causes her to stop periodically.  She has not had any particular treatment for this issue.  She was seen by Dr. Higgins who referred her here for consideration of epidural steroid injections.    Of note, she has known myelopathy/myelomalacia in the cervical spine and has previously been recommended for decompressive surgery by an outside surgeon.  She declined operative intervention as results could not be guaranteed. Her other medical history as below.    PAIN SCORES:  Vitals:    12/05/24 0753   PainSc:   9   PainLoc: Hip       Therapy:  Has performed an entire course of therapy this without notable improvements in strength or function of the lower extremity    Pertinent Medications:  Baby aspirin  All other medications reviewed in the patient's chart.     Pain Intervention History:  None    Review of patient's allergies indicates:   Allergen Reactions    Aspirin Nausea Only     Stomach Pain  Stomach Pain      Lisinopril Other (See Comments)     Cough  Cough      Penicillin g potassium Rash     Rash  Rash       Past Medical History:   Diagnosis Date    A-fib     Abnormal SPEP 03/21/2024    History of  "arm fracture 03/19/2024    Hypertension     Monoclonal paraproteinemia 03/21/2024    Normochromic normocytic anemia 03/19/2024     Past Surgical History:   Procedure Laterality Date    HYSTERECTOMY      INSERTION OF INTRAMEDULLARY ROBIN Right 2/26/2024    Procedure: INSERTION, INTRAMEDULLARY ROBIN;  Surgeon: Luis Gonzalez MD;  Location: [facility];  Service: Orthopedics;  Laterality: Right;  Ken notified 2/26/24 ark       Social History:  Retired  Social History     Tobacco Use    Smoking status: Never    Smokeless tobacco: Never   Substance Use Topics    Alcohol use: Not Currently    Drug use: Never        Family history reviewed in the patient's chart.     PHYSICAL EXAMINATION  VITALS:   Vitals:    12/05/24 0753   BP: (!) 180/100   Pulse: 73   Weight: 98.9 kg (218 lb)   Height: 5' 0.98" (1.549 m)   PainSc:   9   PainLoc: Hip     GENERAL: Calm, cooperative, pleasant  CHEST: No increased work of breathing  SKIN: Intact    MSK/NEURO:  Patient presents in wheelchair   Requires assistance of 2 people to move to the exam table  She has 0/5 strength in the right EHL, ADF.  2/5 in the APF.  4/5 in the KE.  1/5 in the HF.  She has full strength throughout the left lower extremity with the exception of 4/5 ADF/APF  Sensation is intact to light touch throughout the lower extremities   She is hyperreflexic at the right patella, normoreflexic at the left patella, trace reflex at the right Achilles, and hyperreflexic at the left Achilles.    She is hyperreflexic in the upper extremities as well  There is sustained clonus in the left ankle with rapid dorsiflexion  There is a positive Manny sign on the right side  Plantar responses are equivocal on the right  Babinski positive on the left    LABS:  A1c in August 2024 4.8    IMAGING:    MRI of the lumbar spine from November 2024 reviewed.  Diffuse degenerative changes as noted in the radiologist's report associated with multilevel central and neural foraminal stenosis which is most " severe at L4-5.  See radiologist's report for full details.    MRI of the cervical spine from 2023 reviewed.  Myelomalacia/myelopathy noted due to significant multilevel central stenosis in the upper cervical spine.  See radiologist's report for full details.    ASSESSMENT:   64 y.o. year old female with symptoms of lumbar radiculopathy, neurogenic claudication in the setting of known cervical myelopathy/malacia.  At least some of her lower extremity complaints are coming of course from the lumbar region, but there is also likely some contribution from the cervical pathology.    Encounter Diagnoses   Name Primary?    Spondylolisthesis of lumbar region     Degeneration of intervertebral disc of lumbar region with discogenic back pain     Right leg weakness     Foot drop, right     Lumbosacral radiculopathy Yes    Degeneration of intervertebral disc of lumbar region with discogenic back pain and lower extremity pain        PLAN:  I spent a long time (60 minutes) with this patient discussing the natural progression of her cervical pathology including a myelopathy/malacia and the risks associated with it.  She has already declined surgical intervention once.  She declined a 2nd opinion regarding surgical intervention today.  I implored her to reach out to me if she changes her mind about this in the future.  We will try a caudal epidural with a catheter to try to alleviate some of her lumbar radicular symptoms.  I told her that this is highly unlikely to improve the strength/function of her right leg.  Ordered an AFO for her right-sided footdrop to try to decrease her fall risk.  Follow up about a month after the procedure.      Vega Jama MD  This note was completed with dictation software and grammatical/syntax errors may exist.

## 2024-12-05 NOTE — PROGRESS NOTES
Interventional Pain Clinic    Referred by:   Brenton Higgins MD    PCP:   May Rees MD    CHIEF COMPLAINT:   Right leg pain and weakness    HISTORY OF PRESENT ILLNESS:   Paola Ibanez presents for evaluation of chronic right lower extremity weakness and pain as well as bilateral lower extremity heaviness when trying to ambulate.  She reports these symptoms began a few years ago without known inciting event.  She says she drags her right leg because she is unable to pick it up.  She asked to use a walker and relies on her upper extremities to support her during gait.  She endorses paresthesias down the right leg to the foot as well.  She denies significant axial component to her pain, and says that she only sometimes feels pain in the lower back.  As she walks, her legs get progressively heavier which causes her to stop periodically.  She has not had any particular treatment for this issue.  She was seen by Dr. Higgins who referred her here for consideration of epidural steroid injections.    Of note, she has known myelopathy/myelomalacia in the cervical spine and has previously been recommended for decompressive surgery by an outside surgeon.  She declined operative intervention as results could not be guaranteed. Her other medical history as below.    PAIN SCORES:  Vitals:    12/05/24 0753   PainSc:   9   PainLoc: Hip       Therapy:  Has performed an entire course of therapy this without notable improvements in strength or function of the lower extremity    Pertinent Medications:  Baby aspirin  All other medications reviewed in the patient's chart.     Pain Intervention History:  None    Review of patient's allergies indicates:   Allergen Reactions    Aspirin Nausea Only     Stomach Pain  Stomach Pain      Lisinopril Other (See Comments)     Cough  Cough      Penicillin g potassium Rash     Rash  Rash       Past Medical History:   Diagnosis Date    A-fib     Abnormal SPEP 03/21/2024    History of  "arm fracture 03/19/2024    Hypertension     Monoclonal paraproteinemia 03/21/2024    Normochromic normocytic anemia 03/19/2024     Past Surgical History:   Procedure Laterality Date    HYSTERECTOMY      INSERTION OF INTRAMEDULLARY ROBIN Right 2/26/2024    Procedure: INSERTION, INTRAMEDULLARY ROBIN;  Surgeon: Luis Gonzalez MD;  Location: [facility];  Service: Orthopedics;  Laterality: Right;  Ken notified 2/26/24 ark       Social History:  Retired  Social History     Tobacco Use    Smoking status: Never    Smokeless tobacco: Never   Substance Use Topics    Alcohol use: Not Currently    Drug use: Never        Family history reviewed in the patient's chart.     PHYSICAL EXAMINATION  VITALS:   Vitals:    12/05/24 0753   BP: (!) 180/100   Pulse: 73   Weight: 98.9 kg (218 lb)   Height: 5' 0.98" (1.549 m)   PainSc:   9   PainLoc: Hip     GENERAL: Calm, cooperative, pleasant  CHEST: No increased work of breathing  SKIN: Intact    MSK/NEURO:  Patient presents in wheelchair   Requires assistance of 2 people to move to the exam table  She has 0/5 strength in the right EHL, ADF.  2/5 in the APF.  4/5 in the KE.  1/5 in the HF.  She has full strength throughout the left lower extremity with the exception of 4/5 ADF/APF  Sensation is intact to light touch throughout the lower extremities   She is hyperreflexic at the right patella, normoreflexic at the left patella, trace reflex at the right Achilles, and hyperreflexic at the left Achilles.    She is hyperreflexic in the upper extremities as well  There is sustained clonus in the left ankle with rapid dorsiflexion  There is a positive Manny sign on the right side  Plantar responses are equivocal on the right  Babinski positive on the left    LABS:  A1c in August 2024 4.8    IMAGING:    MRI of the lumbar spine from November 2024 reviewed.  Diffuse degenerative changes as noted in the radiologist's report associated with multilevel central and neural foraminal stenosis which is most " severe at L4-5.  See radiologist's report for full details.    MRI of the cervical spine from 2023 reviewed.  Myelomalacia/myelopathy noted due to significant multilevel central stenosis in the upper cervical spine.  See radiologist's report for full details.    ASSESSMENT:   64 y.o. year old female with symptoms of lumbar radiculopathy, neurogenic claudication in the setting of known cervical myelopathy/malacia.  At least some of her lower extremity complaints are coming of course from the lumbar region, but there is also likely some contribution from the cervical pathology.    Encounter Diagnoses   Name Primary?    Spondylolisthesis of lumbar region     Degeneration of intervertebral disc of lumbar region with discogenic back pain     Right leg weakness     Foot drop, right     Lumbosacral radiculopathy Yes    Degeneration of intervertebral disc of lumbar region with discogenic back pain and lower extremity pain        PLAN:  I spent a long time (60 minutes) with this patient discussing the natural progression of her cervical pathology including a myelopathy/malacia and the risks associated with it.  She has already declined surgical intervention once.  She declined a 2nd opinion regarding surgical intervention today.  I implored her to reach out to me if she changes her mind about this in the future.  We will try a caudal epidural with a catheter to try to alleviate some of her lumbar radicular symptoms.  I told her that this is highly unlikely to improve the strength/function of her right leg.  Ordered an AFO for her right-sided footdrop to try to decrease her fall risk.  Follow up about a month after the procedure.      Vega Jama MD  This note was completed with dictation software and grammatical/syntax errors may exist.

## 2024-12-05 NOTE — TELEPHONE ENCOUNTER
Types of orders made on 12/05/2024: General Supply, Outpatient Referral,                                       Procedure Request      Order Date:12/5/2024   Ordering User:VEGA JIN [[id number]]   Encounter Provider:Vega Jin MD [[id number]]   Authorizing Provider: Vega Jin MD [[id number]   Department:[facility] PAIN MANAGEMENT[[id number]]      Common Order Information   Procedure -> Epidural Injection (specify level) Cmt: caudal with cath      Order Specific Information   Order: Procedure Order to Pain Management [Custom: [id number]]  Order #:          [id number]: 1 FUTURE     Priority: Routine  Class: Clinic Performed     Future Order Information       Expires on:12/05/2025            Expected by:12/05/2024                   Associated Diagnoses       M54.17 Lumbosacral radiculopathy       M51.362 Degeneration of intervertebral disc of lumbar region with       discogenic back pain and lower extremity pain       Physician -> cheri          Facility Name: -> [facility]          Follow-up: -> 4 weeks              Priority: Routine  Class: Clinic Performed     Future Order Information       Expires on:12/05/2025            Expected by:12/05/2024                   Associated Diagnoses       M54.17 Lumbosacral radiculopathy       M51.362 Degeneration of intervertebral disc of lumbar region with       discogenic back pain and lower extremity pain       Procedure -> Epidural Injection (specify level) Cmt: caudal with cath          Physician -> cheri          Facility Name: -> [facility]

## 2024-12-09 ENCOUNTER — TELEPHONE (OUTPATIENT)
Dept: ORTHOPEDICS | Facility: CLINIC | Age: 64
End: 2024-12-09
Payer: MEDICARE

## 2024-12-12 ENCOUNTER — TELEPHONE (OUTPATIENT)
Dept: PAIN MEDICINE | Facility: CLINIC | Age: 64
End: 2024-12-12
Payer: MEDICARE

## 2024-12-12 DIAGNOSIS — R79.89 ELEVATED PTHRP LEVEL: Primary | ICD-10-CM

## 2024-12-12 NOTE — TELEPHONE ENCOUNTER
----- Message from Lorenza sent at 12/12/2024  8:44 AM CST -----  Type: Needs Medical Advice  Who Called:  pt     Best Call Back Number: [phone] (home) [phone] (work)    Additional Information: pt requesting call back in regards to rescheduling her procedure please advise

## 2024-12-24 ENCOUNTER — HOSPITAL ENCOUNTER (OUTPATIENT)
Dept: RADIOLOGY | Facility: HOSPITAL | Age: 64
Discharge: HOME | End: 2024-12-24
Attending: INTERNAL MEDICINE
Payer: MEDICARE

## 2024-12-24 DIAGNOSIS — R79.89 ELEVATED PTHRP LEVEL: ICD-10-CM

## 2024-12-24 PROCEDURE — 78070 PARATHYROID PLANAR IMAGING: CPT | Mod: 26,,, | Performed by: RADIOLOGY

## 2024-12-24 PROCEDURE — 78070 PARATHYROID PLANAR IMAGING: CPT | Mod: TC

## 2024-12-24 PROCEDURE — A9500 TC99M SESTAMIBI: HCPCS | Performed by: INTERNAL MEDICINE

## 2024-12-24 RX ORDER — TETRAKIS(2-METHOXYISOBUTYLISOCYANIDE)COPPER(I) TETRAFLUOROBORATE 1 MG/ML
22.5 INJECTION, POWDER, LYOPHILIZED, FOR SOLUTION INTRAVENOUS
Status: COMPLETED | OUTPATIENT
Start: 2024-12-24 | End: 2024-12-24

## 2024-12-24 RX ADMIN — TECHNETIUM TC 99M SESTAMIBI 22.5 MILLICURIE: 1 INJECTION, POWDER, FOR SOLUTION INTRAVENOUS at 07:12

## 2025-01-03 ENCOUNTER — HOSPITAL ENCOUNTER (OUTPATIENT)
Facility: HOSPITAL | Age: 65
Discharge: HOME | End: 2025-01-03
Attending: STUDENT IN AN ORGANIZED HEALTH CARE EDUCATION/TRAINING PROGRAM | Admitting: STUDENT IN AN ORGANIZED HEALTH CARE EDUCATION/TRAINING PROGRAM
Payer: MEDICARE

## 2025-01-03 DIAGNOSIS — M54.16 LUMBAR RADICULITIS: ICD-10-CM

## 2025-01-03 PROCEDURE — 25000003 PHARM REV CODE 250: Performed by: STUDENT IN AN ORGANIZED HEALTH CARE EDUCATION/TRAINING PROGRAM

## 2025-01-03 PROCEDURE — 62323 NJX INTERLAMINAR LMBR/SAC: CPT | Mod: ,,, | Performed by: STUDENT IN AN ORGANIZED HEALTH CARE EDUCATION/TRAINING PROGRAM

## 2025-01-03 PROCEDURE — A4216 STERILE WATER/SALINE, 10 ML: HCPCS | Performed by: STUDENT IN AN ORGANIZED HEALTH CARE EDUCATION/TRAINING PROGRAM

## 2025-01-03 PROCEDURE — 62323 NJX INTERLAMINAR LMBR/SAC: CPT | Performed by: STUDENT IN AN ORGANIZED HEALTH CARE EDUCATION/TRAINING PROGRAM

## 2025-01-03 PROCEDURE — 63600175 PHARM REV CODE 636 W HCPCS: Performed by: STUDENT IN AN ORGANIZED HEALTH CARE EDUCATION/TRAINING PROGRAM

## 2025-01-03 RX ORDER — SODIUM CHLORIDE, SODIUM LACTATE, POTASSIUM CHLORIDE, CALCIUM CHLORIDE 600; 310; 30; 20 MG/100ML; MG/100ML; MG/100ML; MG/100ML
INJECTION, SOLUTION INTRAVENOUS CONTINUOUS
Status: DISCONTINUED | OUTPATIENT
Start: 2025-01-03 | End: 2025-01-03 | Stop reason: HOSPADM

## 2025-01-03 RX ORDER — BUPIVACAINE HYDROCHLORIDE 2.5 MG/ML
INJECTION, SOLUTION EPIDURAL; INFILTRATION; INTRACAUDAL
Status: DISCONTINUED | OUTPATIENT
Start: 2025-01-03 | End: 2025-01-03 | Stop reason: HOSPADM

## 2025-01-03 RX ORDER — LIDOCAINE HYDROCHLORIDE 10 MG/ML
INJECTION, SOLUTION EPIDURAL; INFILTRATION; INTRACAUDAL; PERINEURAL
Status: DISCONTINUED | OUTPATIENT
Start: 2025-01-03 | End: 2025-01-03 | Stop reason: HOSPADM

## 2025-01-03 RX ORDER — LIDOCAINE HYDROCHLORIDE 10 MG/ML
1 INJECTION, SOLUTION EPIDURAL; INFILTRATION; INTRACAUDAL; PERINEURAL ONCE
Status: DISCONTINUED | OUTPATIENT
Start: 2025-01-03 | End: 2025-01-03 | Stop reason: HOSPADM

## 2025-01-03 RX ORDER — SODIUM CHLORIDE 9 MG/ML
INJECTION, SOLUTION INTRAMUSCULAR; INTRAVENOUS; SUBCUTANEOUS
Status: DISCONTINUED | OUTPATIENT
Start: 2025-01-03 | End: 2025-01-03 | Stop reason: HOSPADM

## 2025-01-03 RX ORDER — METHYLPREDNISOLONE ACETATE 80 MG/ML
INJECTION, SUSPENSION INTRA-ARTICULAR; INTRALESIONAL; INTRAMUSCULAR; SOFT TISSUE
Status: DISCONTINUED | OUTPATIENT
Start: 2025-01-03 | End: 2025-01-03 | Stop reason: HOSPADM

## 2025-01-03 RX ORDER — ALPRAZOLAM 0.25 MG/1
0.5 TABLET, ORALLY DISINTEGRATING ORAL
Status: COMPLETED | OUTPATIENT
Start: 2025-01-03 | End: 2025-01-03

## 2025-01-03 RX ADMIN — ALPRAZOLAM 0.5 MG: 0.25 TABLET, ORALLY DISINTEGRATING ORAL at 09:01

## 2025-01-03 NOTE — DISCHARGE SUMMARY
[facility]  Discharge Note  Short Stay     Admit Date: 1/3/2025    Discharge Date: 1/3/2025     Attending Physician: Vega Jama MD    Diagnoses:  Lumbar radiculopathy    Discharged Condition: Good     Hospital Course: Patient was admitted for an outpatient interventional pain management procedure and tolerated the procedure well with no complications.     Final Diagnoses: Same as principal problem.     Disposition: Home or Self Care     Follow up/Patient Instructions:   Follow-up in 4 weeks unless otherwise instructed. May return sooner as needed.       Reconciled Medications:     Medication List        ASK your doctor about these medications      acetaminophen 650 MG Tbsr  Commonly known as: TYLENOL  Take 1 tablet (650 mg total) by mouth every 8 (eight) hours.     albuterol 90 mcg/actuation inhaler  Commonly known as: PROVENTIL/VENTOLIN HFA  Inhale 1-2 puffs into the lungs every 6 (six) hours as needed for Wheezing. Rescue     amLODIPine 5 MG tablet  Commonly known as: NORVASC  Take 1 tablet by mouth every morning.     aspirin 81 MG EC tablet  Commonly known as: ECOTRIN  Take 1 tablet by mouth every morning.     cloNIDine 0.1 MG tablet  Commonly known as: CATAPRES  Take 0.1 mg by mouth every 8 (eight) hours as needed.     cyanocobalamin (vitamin B-12) 2,000 mcg Tab  Take 2,000 mcg by mouth once daily.     fluticasone propionate 50 mcg/actuation nasal spray  Commonly known as: FLONASE  1 spray by Nasal route once daily.     furosemide 40 MG tablet  Commonly known as: LASIX  Take 40 mg by mouth daily as needed.     hydrALAZINE 25 MG tablet  Commonly known as: APRESOLINE  Take 25 mg by mouth every 8 (eight) hours.     hydroCHLOROthiazide 25 MG tablet  Commonly known as: HYDRODIURIL  Take 25 mg by mouth every morning.     HYDROcodone-acetaminophen 5-325 mg per tablet  Commonly known as: NORCO  Take 1 tablet by mouth every 6 (six) hours as needed for Pain.     losartan 100 MG tablet  Commonly known  as: COZAAR  Take 1 tablet (100 mg total) by mouth once daily.     metoprolol tartrate 25 MG tablet  Commonly known as: LOPRESSOR  SMARTSI Tablet(s) By Mouth Morning-Night     potassium chloride SA 20 MEQ tablet  Commonly known as: K-DUR,KLOR-CON  Take 20 mEq by mouth daily as needed.     senna-docusate 8.6-50 mg 8.6-50 mg per tablet  Commonly known as: PERICOLACE  Take 1 tablet by mouth 2 (two) times a day.            No discharge procedures on file.    Vega Jama MD  Interventional Pain Medicine / Physical Medicine & Rehabilitation

## 2025-01-03 NOTE — OP NOTE
Caudal Epidural Steroid Injection with Catheter under Fluoroscopic Guidance    The procedure, risks, benefits, and options were discussed with the patient. There are no contraindications to the procedure. The patent expressed understanding and agreed to the procedure. Informed written consent was obtained prior to the start of the procedure and can be found in the patient's chart.    PATIENT NAME: Paola Ibanez   MRN: [id number]     DATE OF PROCEDURE: 01/03/2025    PROCEDURE: Caudal Epidural Steroid Injection under Fluoroscopic Guidance    PRE-OP DIAGNOSIS: Lumbosacral radiculopathy [M54.17] Lumbar radiculopathy [M54.16]    POST-OP DIAGNOSIS: Same    PHYSICIAN: Vega Jama MD    ASSISTANTS: none     MEDICATIONS INJECTED: depomedrol 80mg + 4cc 0.25% PF marcaine    LOCAL ANESTHETIC INJECTED: Xylocaine 1%     SEDATION: 0.5mg PO xanax    ESTIMATED BLOOD LOSS: None    COMPLICATIONS: None    TECHNIQUE: Time-out was performed to identify the patient and procedure to be performed. With the patient laying in a prone position, the surgical area was prepped and draped in the usual sterile fashion using ChloraPrep and a fenestrated drape. The injection site was determined under fluoroscopy guidance. Skin anesthesia was achieved by injecting Lidocaine 2% over the injection site. The sacrum and sacral cornua were then approached with a 16 gauge, 3.5 inch epidural needle that was introduced and advanced into the sacral hiatus under fluoroscopic guidance with AP, lateral and/or contralateral oblique imaging. After the needle passed through the sacrococcygeal ligament, the needle angle was lowered and the needle was advanced 1 cm. After negative aspiration of blood or CSF, and no evidence of paraesthesias, the catheter was threaded through the needle into the epidural space using live fluoroscopy, contrast dye Omnipaque (300mg/mL) was injected to confirm placement and there was no vascular runoff. Fluoroscopic imaging in  the AP and lateral views revealed a clear outline of the spinal nerve with proximal spread of agent through the caudal epidural space. Then 5 mL of the medication mixture listed above was injected slowly. Displacement of the radio opaque contrast after injection of the medication confirmed that the medication went into the area of the transforaminal spaces. The needles were removed and bleeding was nil. A sterile dressing was applied. No specimens collected. The patient tolerated the procedure well.     The patient was monitored after the procedure in the recovery area. They were given post-procedure and discharge instructions to follow at home. The patient was discharged in a stable condition.    Vega Jama MD

## 2025-01-03 NOTE — PLAN OF CARE
Pt ready for DC, instructions given pt pt and pt's son. Pt is c/o numbness to legs bilaterally, able to ambulate to BR with assistance at this time and pt does normally use walker. Dr Lagos informed of numbness and spoke with pt and pt's son in PACU regarding concerns , also aware pt has had high BP since arrival. Instructed by Dr Lagos to monitor at home and take necessary BP meds that pt normally takes to control. OK to DC now.

## 2025-01-03 NOTE — INTERVAL H&P NOTE
The patient has been examined and the H&P has been reviewed:    I concur with the findings and no changes have occurred since H&P was written.    Anesthesia/Surgery risks, benefits and alternative options discussed and understood by patient/family.    Regular rate, irregular rhythm/CTABL      There are no hospital problems to display for this patient.

## 2025-01-06 VITALS
HEIGHT: 61 IN | WEIGHT: 218.06 LBS | HEART RATE: 60 BPM | OXYGEN SATURATION: 98 % | SYSTOLIC BLOOD PRESSURE: 183 MMHG | RESPIRATION RATE: 19 BRPM | TEMPERATURE: 98 F | DIASTOLIC BLOOD PRESSURE: 88 MMHG | BODY MASS INDEX: 41.17 KG/M2

## 2025-01-16 ENCOUNTER — TELEPHONE (OUTPATIENT)
Dept: PAIN MEDICINE | Facility: CLINIC | Age: 65
End: 2025-01-16

## 2025-01-16 ENCOUNTER — OFFICE VISIT (OUTPATIENT)
Dept: PAIN MEDICINE | Facility: CLINIC | Age: 65
End: 2025-01-16
Payer: MEDICARE

## 2025-01-16 VITALS
WEIGHT: 218.06 LBS | HEART RATE: 70 BPM | SYSTOLIC BLOOD PRESSURE: 189 MMHG | BODY MASS INDEX: 41.17 KG/M2 | HEIGHT: 61 IN | DIASTOLIC BLOOD PRESSURE: 96 MMHG | RESPIRATION RATE: 17 BRPM

## 2025-01-16 DIAGNOSIS — M21.371 FOOT DROP, RIGHT: ICD-10-CM

## 2025-01-16 DIAGNOSIS — M54.17 LUMBOSACRAL RADICULOPATHY: Primary | ICD-10-CM

## 2025-01-16 DIAGNOSIS — R26.9 GAIT ABNORMALITY: ICD-10-CM

## 2025-01-16 DIAGNOSIS — M43.16 SPONDYLOLISTHESIS OF LUMBAR REGION: ICD-10-CM

## 2025-01-16 DIAGNOSIS — G95.9 CERVICAL MYELOPATHY: ICD-10-CM

## 2025-01-16 PROCEDURE — 1100F PTFALLS ASSESS-DOCD GE2>/YR: CPT | Mod: CPTII,S$GLB,, | Performed by: STUDENT IN AN ORGANIZED HEALTH CARE EDUCATION/TRAINING PROGRAM

## 2025-01-16 PROCEDURE — 99213 OFFICE O/P EST LOW 20 MIN: CPT | Mod: S$GLB,,, | Performed by: STUDENT IN AN ORGANIZED HEALTH CARE EDUCATION/TRAINING PROGRAM

## 2025-01-16 PROCEDURE — 1159F MED LIST DOCD IN RCRD: CPT | Mod: CPTII,S$GLB,, | Performed by: STUDENT IN AN ORGANIZED HEALTH CARE EDUCATION/TRAINING PROGRAM

## 2025-01-16 PROCEDURE — 1126F AMNT PAIN NOTED NONE PRSNT: CPT | Mod: CPTII,S$GLB,, | Performed by: STUDENT IN AN ORGANIZED HEALTH CARE EDUCATION/TRAINING PROGRAM

## 2025-01-16 PROCEDURE — 3288F FALL RISK ASSESSMENT DOCD: CPT | Mod: CPTII,S$GLB,, | Performed by: STUDENT IN AN ORGANIZED HEALTH CARE EDUCATION/TRAINING PROGRAM

## 2025-01-16 PROCEDURE — 3008F BODY MASS INDEX DOCD: CPT | Mod: CPTII,S$GLB,, | Performed by: STUDENT IN AN ORGANIZED HEALTH CARE EDUCATION/TRAINING PROGRAM

## 2025-01-16 PROCEDURE — 3080F DIAST BP >= 90 MM HG: CPT | Mod: CPTII,S$GLB,, | Performed by: STUDENT IN AN ORGANIZED HEALTH CARE EDUCATION/TRAINING PROGRAM

## 2025-01-16 PROCEDURE — 99999 PR PBB SHADOW E&M-EST. PATIENT-LVL III: CPT | Mod: PBBFAC,,, | Performed by: STUDENT IN AN ORGANIZED HEALTH CARE EDUCATION/TRAINING PROGRAM

## 2025-01-16 PROCEDURE — 3077F SYST BP >= 140 MM HG: CPT | Mod: CPTII,S$GLB,, | Performed by: STUDENT IN AN ORGANIZED HEALTH CARE EDUCATION/TRAINING PROGRAM

## 2025-01-16 NOTE — PROGRESS NOTES
AFO and HME order routed and faxed to [facility] via [employer].   - informed pt orders sent to [facility].  Also advised pt to contact [employer], her insurance company to see if they have a specific DME facility or location they are in network with. Pt v/u with no further questions or concerns, and states she will contact her insurance company, and let us know if we need to send to another company.

## 2025-01-16 NOTE — PROGRESS NOTES
"[facility]    PCP:   May Rees MD    CHIEF COMPLAINT:   Procedure follow up    HISTORY OF PRESENT ILLNESS:   Paola Ibanez presents for follow-up after undergoing caudal ALEM on 1/3. She reports no complications from the procedure. She did not feel relief for the first week but has noticed benefit over the last couple days. Still has claudication symptoms and right foot drop. No new symptoms.     Review of patient's allergies indicates:   Allergen Reactions    Aspirin Nausea Only     Stomach Pain  Stomach Pain      Lisinopril Other (See Comments)     Cough  Cough      Penicillin g potassium Rash     Rash  Rash         Past Medical History:   Diagnosis Date    A-fib     Abnormal SPEP 03/21/2024    History of arm fracture 03/19/2024    Hypertension     Monoclonal paraproteinemia 03/21/2024    Normochromic normocytic anemia 03/19/2024       Past Surgical History:   Procedure Laterality Date    CAUDAL EPIDURAL STEROID INJECTION N/A 1/3/2025    Procedure: Injection-steroid-epidural-caudal with catheter;  Surgeon: Vega Jama MD;  Location: [facility];  Service: Pain Management;  Laterality: N/A;    HYSTERECTOMY      INSERTION OF INTRAMEDULLARY ROBIN Right 2/26/2024    Procedure: INSERTION, INTRAMEDULLARY ROBIN;  Surgeon: Luis Gonzalez MD;  Location: [facility] OR;  Service: Orthopedics;  Laterality: Right;  Ken notified 2/26/24 ark       Social History     Tobacco Use    Smoking status: Never    Smokeless tobacco: Never   Substance Use Topics    Alcohol use: Not Currently    Drug use: Never        PHYSICAL EXAMINATION  VITALS:   Vitals:    01/16/25 0923   BP: (!) 189/96   Pulse: 70   Resp: 17   Weight: 98.9 kg (218 lb 0.6 oz)   Height: 5' 0.98" (1.549 m)   PainSc: 0-No pain       Physical Exam  Constitutional:       Appearance: Normal appearance.   Cardiovascular:      Rate and Rhythm: Normal rate.   Pulmonary:      Effort: No respiratory distress.   Musculoskeletal:      Cervical " back: Normal range of motion.   Skin:     General: Skin is warm and dry.   Neurological:      Mental Status: She is alert and oriented to person, place, and time. Mental status is at baseline.      Coordination: Coordination abnormal.      Gait: Gait abnormal.   Psychiatric:         Mood and Affect: Mood normal.         Behavior: Behavior normal.       ASSESSMENT:   64 y.o. year old female almost 2 weeks status post caudal ALEM for lumbar radiculopathy symptoms with some improvement in pain.  Continues to have claudication and gait instability.    Encounter Diagnoses   Name Primary?    Foot drop, right     Lumbosacral radiculopathy Yes    Spondylolisthesis of lumbar region     Cervical myelopathy     Gait abnormality        PLAN:  We will reorder the right AFO as she never received this equipment.  Discussed again that at least part of her gait instability and coordination difficulties are coming from the myelopathy in her neck.  She politely disagreed.  She has a follow up with Dr. Higgins in early February.  She wants to discuss her care with him before receiving any further treatments here.  This is reasonable.  She can follow up as needed.        Vega Jama MD  This note was completed with dictation software and grammatical/syntax errors may exist.  More than 20 minutes was spent with the patient discussing their care.

## 2025-02-05 ENCOUNTER — OFFICE VISIT (OUTPATIENT)
Dept: ORTHOPEDICS | Facility: CLINIC | Age: 65
End: 2025-02-05
Payer: MEDICARE

## 2025-02-05 VITALS — BODY MASS INDEX: 41.17 KG/M2 | WEIGHT: 218.06 LBS | HEIGHT: 61 IN

## 2025-02-05 DIAGNOSIS — M51.360 DEGENERATION OF INTERVERTEBRAL DISC OF LUMBAR REGION WITH DISCOGENIC BACK PAIN: ICD-10-CM

## 2025-02-05 DIAGNOSIS — M47.816 FACET ARTHRITIS OF LUMBAR REGION: ICD-10-CM

## 2025-02-05 DIAGNOSIS — R29.898 RIGHT LEG WEAKNESS: ICD-10-CM

## 2025-02-05 DIAGNOSIS — M48.062 SPINAL STENOSIS OF LUMBAR REGION WITH NEUROGENIC CLAUDICATION: Primary | ICD-10-CM

## 2025-02-05 DIAGNOSIS — M54.12 CERVICAL RADICULOPATHY: ICD-10-CM

## 2025-02-05 DIAGNOSIS — M48.02 SPINAL STENOSIS IN CERVICAL REGION: ICD-10-CM

## 2025-02-05 DIAGNOSIS — M48.02 CERVICAL STENOSIS OF SPINAL CANAL: ICD-10-CM

## 2025-02-05 DIAGNOSIS — M50.30 DEGENERATIVE CERVICAL DISC: ICD-10-CM

## 2025-02-05 DIAGNOSIS — M43.16 SPONDYLOLISTHESIS OF LUMBAR REGION: ICD-10-CM

## 2025-02-05 PROCEDURE — 99213 OFFICE O/P EST LOW 20 MIN: CPT | Mod: S$GLB,,, | Performed by: ORTHOPAEDIC SURGERY

## 2025-02-05 PROCEDURE — 1126F AMNT PAIN NOTED NONE PRSNT: CPT | Mod: CPTII,S$GLB,, | Performed by: ORTHOPAEDIC SURGERY

## 2025-02-05 PROCEDURE — 1160F RVW MEDS BY RX/DR IN RCRD: CPT | Mod: CPTII,S$GLB,, | Performed by: ORTHOPAEDIC SURGERY

## 2025-02-05 PROCEDURE — 1159F MED LIST DOCD IN RCRD: CPT | Mod: CPTII,S$GLB,, | Performed by: ORTHOPAEDIC SURGERY

## 2025-02-05 PROCEDURE — 99999 PR PBB SHADOW E&M-EST. PATIENT-LVL IV: CPT | Mod: PBBFAC,,, | Performed by: ORTHOPAEDIC SURGERY

## 2025-02-05 PROCEDURE — 3008F BODY MASS INDEX DOCD: CPT | Mod: CPTII,S$GLB,, | Performed by: ORTHOPAEDIC SURGERY

## 2025-02-05 NOTE — PROGRESS NOTES
Subjective:       Patient ID: Paola Ibanez is a 64 y.o. female.    Chief Complaint: Pain of the Lumbar Spine (Patient is here for a follow up on lumbar pain, had caudal epidural steroid injection 1.3.25 with Dr. Jama. States pain has stayed about the same since last visit. Has occasional spasms sensation in her feet as well as numbness and tingling down the right leg to the foot )      History of Present Illness    Prior to meeting with the patient I reviewed the medical chart in [facility]. This included reviewing the previous progress notes from our office, review of the patient's last appointment with their primary care provider, review of any visits to the emergency room, and review of any pain management appointments or procedures.   Patient returns for follow-up evaluation she had an epidural steroid injection that only helped her symptoms minimally she has 2 different complaints jumping in her legs weakness of the right leg and difficulty with walking she did see a surgeon in the past who told her that she had to have 5 disc removed from the cervical spine and this was a problem she has minimal neck pain she notes numbness in the right arm but had a fractured arm and a lisbeth put in it and noted numbness after this.  No bowel or bladder incontinence  Her main complaint today is weakness in her right foot and stumbling over her right foot  Current Medications  Current Outpatient Medications   Medication Sig Dispense Refill    acetaminophen (TYLENOL) 650 MG TbSR Take 1 tablet (650 mg total) by mouth every 8 (eight) hours. 30 tablet 0    albuterol (PROVENTIL/VENTOLIN HFA) 90 mcg/actuation inhaler Inhale 1-2 puffs into the lungs every 6 (six) hours as needed for Wheezing. Rescue 18 g 0    amLODIPine (NORVASC) 5 MG tablet Take 1 tablet by mouth every morning.      aspirin (ECOTRIN) 81 MG EC tablet Take 1 tablet by mouth every morning.      cloNIDine (CATAPRES) 0.1 MG tablet Take 0.1 mg by mouth every 8 (eight)  hours as needed.      cyanocobalamin 2,000 mcg Tab Take 2,000 mcg by mouth once daily. 30 tablet 5    fluticasone propionate (FLONASE) 50 mcg/actuation nasal spray 1 spray by Nasal route once daily.      furosemide (LASIX) 40 MG tablet Take 40 mg by mouth daily as needed.      hydroCHLOROthiazide (HYDRODIURIL) 25 MG tablet Take 25 mg by mouth every morning.      HYDROcodone-acetaminophen (NORCO) 5-325 mg per tablet Take 1 tablet by mouth every 6 (six) hours as needed for Pain. 30 tablet 0    losartan (COZAAR) 100 MG tablet Take 1 tablet (100 mg total) by mouth once daily. 30 tablet 5    metoprolol tartrate (LOPRESSOR) 25 MG tablet SMARTSI Tablet(s) By Mouth Morning-Night      potassium chloride SA (K-DUR,KLOR-CON) 20 MEQ tablet Take 20 mEq by mouth daily as needed.      senna-docusate 8.6-50 mg (PERICOLACE) 8.6-50 mg per tablet Take 1 tablet by mouth 2 (two) times a day. 60 tablet 1    hydrALAZINE (APRESOLINE) 25 MG tablet Take 25 mg by mouth every 8 (eight) hours. (Patient not taking: Reported on 2025)       No current facility-administered medications for this visit.       Allergies  Review of patient's allergies indicates:   Allergen Reactions    Aspirin Nausea Only     Stomach Pain  Stomach Pain      Lisinopril Other (See Comments)     Cough  Cough      Penicillin g potassium Rash     Rash  Rash         Past Medical History  Past Medical History:   Diagnosis Date    A-fib     Abnormal SPEP 2024    History of arm fracture 2024    Hypertension     Monoclonal paraproteinemia 2024    Normochromic normocytic anemia 2024       Surgical History  Past Surgical History:   Procedure Laterality Date    CAUDAL EPIDURAL STEROID INJECTION N/A 1/3/2025    Procedure: Injection-steroid-epidural-caudal with catheter;  Surgeon: Vega Jama MD;  [facility];  Service: Pain Management;  Laterality: N/A;    HYSTERECTOMY      INSERTION OF INTRAMEDULLARY ROBIN Right 2024     Procedure: INSERTION, INTRAMEDULLARY ROBIN;  Surgeon: Luis Gonzalez MD;  Location: [facility];  Service: Orthopedics;  Laterality: Right;  Ken notified 2/26/24 ark       Family History:   Family History   Problem Relation Name Age of Onset    Seizures Sister      Breast cancer Mother         Social History:   Social History     Socioeconomic History    Marital status: Single   Tobacco Use    Smoking status: Never    Smokeless tobacco: Never   Substance and Sexual Activity    Alcohol use: Not Currently    Drug use: Never       Hospitalization/Major Diagnostic Procedure:     Review of Systems     General/Constitutional:  Chills denies. Fatigue denies. Fever denies. Weight gain denies. Weight loss denies.    Respiratory:  Shortness of breath denies.    Cardiovascular:  Chest pain denies.    Gastrointestinal:  Constipation denies. Diarrhea denies. Nausea denies. Vomiting denies.     Hematology:  Easy bruising denies. Prolonged bleeding denies.     Genitourinary:  Frequent urination denies. Pain in lower back denies. Painful urination denies.     Musculoskeletal:  See HPI for details    Skin:  Rash denies.    Neurologic:  Dizziness denies. Gait abnormalities denies. Seizures denies. Tingling/Numbess denies.    Psychiatric:  Anxiety denies. Depressed mood denies.     Objective:   Vital Signs: There were no vitals filed for this visit.     Physical Exam      General Examination:     Constitutional: The patient is alert and oriented to lace person and time. Mood is pleasant.     Head/Face: Normal facial features normal eyebrows    Eyes: Normal extraocular motion bilaterally    Lungs: Respirations are equal and unlabored    Gait is coordinated.    Cardiovascular: There are no swelling or varicosities present.    Lymphatic: Negative for adenopathy    Skin: Normal    Neurological: Level of consciousness normal. Oriented to place person and time and situation    Psychiatric: Oriented to time place person and situation    Patient  stand erect has pain when doing so lumbar spine moderately tender no spasm range motion limited straight leg raising maneuver weakly positive on right negative on the left motor exam grade 3 of 5 weakness of right anterior tibial compared to left left anterior tibial grade 4 5 plantar flexors intact knee extensors intact no edema adenopathy varicosities  Cervical range of motion mildly restricted no spasm negative Manny maneuver bilaterally slightly hyperreflexic bilaterally upper and lower extremities no clonus either lower extremity  XRAY Report/ Interpretation:  Cervical MRI 2023 was reviewed there is stenosis at C3-4 and C4-5 there is evidence of signal intensity change in the cervical spinal cord  Previous lumbar MRI reviewed multilevel spinal stenosis as well as degenerative spondylolisthesis L4-5 please see report for details      Assessment:       1. Spinal stenosis of lumbar region with neurogenic claudication    2. Spondylolisthesis of lumbar region    3. Degeneration of intervertebral disc of lumbar region with discogenic back pain    4. Right leg weakness    5. Facet arthritis of lumbar region    6. Cervical stenosis of spinal canal        Plan:       Paola was seen today for pain.    Diagnoses and all orders for this visit:    Spinal stenosis of lumbar region with neurogenic claudication    Spondylolisthesis of lumbar region    Degeneration of intervertebral disc of lumbar region with discogenic back pain    Right leg weakness    Facet arthritis of lumbar region    Cervical stenosis of spinal canal         No follow-ups on file.    I think her problems are twofold the weakness in her right leg is consistent with the stenosis in the lumbar spine.  The complaints of jerking of her legs are consistent with cervical stenosis and possible cervical myelomalacia.  She has a negative Dillard's test.  She does have some slightly hyper reflexia in both upper and lower extremities..  Her neck is nonpainful.   I have explained again there to different things going on although the main problem and complaint of weakness and stumbling over her right foot due to weakness of the anterior tib is coming from her lumbar spine    Updated cervical MRI is ordered patient would like to try a course of physical therapy      Treatment options were discussed with regards to the nature of the medical condition. Conservative pain intervention and surgical options were discussed in detail. The probability of success of each separate treatment option was discussed. The patient expressed a clear understanding of the treatment options. With regards to surgery, the procedure risk, benefits, complications, and outcomes were discussed. No guarantees were given with regards to surgical outcome.   The risk of complications, morbidity, and mortality of patient management decisions have been made at the time of this visit. These are associated with the patient's problems, diagnostic procedures and treatment options. This includes the possible management options selected and those considered but not selected by the patient after shared medical decision making we discussed with the patient.     This note was created using Dragon voice recognition software that occasionally misinterpreted phrases or words.

## 2025-02-18 ENCOUNTER — HOSPITAL ENCOUNTER (OUTPATIENT)
Dept: RADIOLOGY | Facility: HOSPITAL | Age: 65
Discharge: HOME | End: 2025-02-18
Attending: ORTHOPAEDIC SURGERY
Payer: MEDICARE

## 2025-02-18 DIAGNOSIS — M48.02 SPINAL STENOSIS IN CERVICAL REGION: ICD-10-CM

## 2025-02-18 DIAGNOSIS — M50.30 DEGENERATIVE CERVICAL DISC: ICD-10-CM

## 2025-02-18 DIAGNOSIS — M54.12 CERVICAL RADICULOPATHY: ICD-10-CM

## 2025-02-18 PROCEDURE — 72141 MRI NECK SPINE W/O DYE: CPT | Mod: TC,PO

## 2025-02-18 PROCEDURE — 72141 MRI NECK SPINE W/O DYE: CPT | Mod: 26,,, | Performed by: RADIOLOGY

## 2025-02-27 ENCOUNTER — OFFICE VISIT (OUTPATIENT)
Dept: SURGERY | Facility: CLINIC | Age: 65
End: 2025-02-27
Payer: MEDICARE

## 2025-02-27 VITALS
RESPIRATION RATE: 16 BRPM | DIASTOLIC BLOOD PRESSURE: 92 MMHG | HEART RATE: 76 BPM | BODY MASS INDEX: 42.81 KG/M2 | TEMPERATURE: 98 F | SYSTOLIC BLOOD PRESSURE: 160 MMHG | HEIGHT: 60 IN | WEIGHT: 218.06 LBS

## 2025-02-27 DIAGNOSIS — E21.3 HYPERPARATHYROIDISM: Primary | ICD-10-CM

## 2025-02-27 PROCEDURE — 1159F MED LIST DOCD IN RCRD: CPT | Mod: CPTII,S$GLB,, | Performed by: STUDENT IN AN ORGANIZED HEALTH CARE EDUCATION/TRAINING PROGRAM

## 2025-02-27 PROCEDURE — 99204 OFFICE O/P NEW MOD 45 MIN: CPT | Mod: S$GLB,,, | Performed by: STUDENT IN AN ORGANIZED HEALTH CARE EDUCATION/TRAINING PROGRAM

## 2025-02-27 PROCEDURE — 3008F BODY MASS INDEX DOCD: CPT | Mod: CPTII,S$GLB,, | Performed by: STUDENT IN AN ORGANIZED HEALTH CARE EDUCATION/TRAINING PROGRAM

## 2025-02-27 PROCEDURE — 1101F PT FALLS ASSESS-DOCD LE1/YR: CPT | Mod: CPTII,S$GLB,, | Performed by: STUDENT IN AN ORGANIZED HEALTH CARE EDUCATION/TRAINING PROGRAM

## 2025-02-27 PROCEDURE — 3288F FALL RISK ASSESSMENT DOCD: CPT | Mod: CPTII,S$GLB,, | Performed by: STUDENT IN AN ORGANIZED HEALTH CARE EDUCATION/TRAINING PROGRAM

## 2025-02-27 PROCEDURE — 3080F DIAST BP >= 90 MM HG: CPT | Mod: CPTII,S$GLB,, | Performed by: STUDENT IN AN ORGANIZED HEALTH CARE EDUCATION/TRAINING PROGRAM

## 2025-02-27 PROCEDURE — 99999 PR PBB SHADOW E&M-EST. PATIENT-LVL III: CPT | Mod: PBBFAC,,, | Performed by: STUDENT IN AN ORGANIZED HEALTH CARE EDUCATION/TRAINING PROGRAM

## 2025-02-27 PROCEDURE — 3077F SYST BP >= 140 MM HG: CPT | Mod: CPTII,S$GLB,, | Performed by: STUDENT IN AN ORGANIZED HEALTH CARE EDUCATION/TRAINING PROGRAM

## 2025-02-27 PROCEDURE — 1126F AMNT PAIN NOTED NONE PRSNT: CPT | Mod: CPTII,S$GLB,, | Performed by: STUDENT IN AN ORGANIZED HEALTH CARE EDUCATION/TRAINING PROGRAM

## 2025-02-27 NOTE — PROGRESS NOTES
"History & Physical    Subjective     History of Present Illness:  Patient is a 65 y.o. female presents with an elevated calcium.  She was referred to me thyroid nodules however it appears that she was ultimately referred to me for parathyroid adenoma.  Patient has a history of degenerative joint disease.  No clear history of kidney stones although patient does report that she had "calcium" in her urine.  Otherwise, patient is asymptomatic.    No chief complaint on file.      Review of patient's allergies indicates:   Allergen Reactions    Aspirin Nausea Only     Stomach Pain  Stomach Pain      Lisinopril Other (See Comments)     Cough  Cough      Penicillin g potassium Rash     Rash  Rash         Current Medications[1]    Past Medical History:   Diagnosis Date    A-fib     Abnormal SPEP 03/21/2024    History of arm fracture 03/19/2024    Hypertension     Monoclonal paraproteinemia 03/21/2024    Normochromic normocytic anemia 03/19/2024     Past Surgical History:   Procedure Laterality Date    CAUDAL EPIDURAL STEROID INJECTION N/A 1/3/2025    Procedure: Injection-steroid-epidural-caudal with catheter;  Surgeon: Vega Jama MD;  Location: [facility];  Service: Pain Management;  Laterality: N/A;    HYSTERECTOMY      INSERTION OF INTRAMEDULLARY ROBIN Right 2/26/2024    Procedure: INSERTION, INTRAMEDULLARY ROBIN;  Surgeon: Luis Gonzalez MD;  Location: [facility] OR;  Service: Orthopedics;  Laterality: Right;  Ken notified 2/26/24 ark     Family History   Problem Relation Name Age of Onset    Seizures Sister      Breast cancer Mother       Social History[2]     Review of Systems:  Review of Systems   Constitutional:  Negative for fever.   HENT: Negative.     Eyes: Negative.    Respiratory: Negative.     Cardiovascular: Negative.    Gastrointestinal: Negative.    Endocrine: Negative.    Genitourinary: Negative.    Musculoskeletal: Negative.    Skin: Negative.    Allergic/Immunologic: Negative.    Neurological: " Negative.    Hematological: Negative.    Psychiatric/Behavioral: Negative.            Objective     Vital Signs (Most Recent)  Temp: 97.7 °F (36.5 °C) (02/27/25 0907)  Pulse: 76 (02/27/25 0907)  Resp: 16 (02/27/25 0907)  BP: (!) 160/92 (02/27/25 0907)  5' (1.524 m)  98.9 kg (218 lb 0.6 oz)     Physical Exam:  Physical Exam  Constitutional:       General: She is not in acute distress.     Appearance: Normal appearance. She is not ill-appearing, toxic-appearing or diaphoretic.   HENT:      Head: Normocephalic.      Nose: Nose normal.   Eyes:      Conjunctiva/sclera: Conjunctivae normal.   Cardiovascular:      Rate and Rhythm: Normal rate and regular rhythm.   Pulmonary:      Effort: Pulmonary effort is normal.   Abdominal:      Palpations: Abdomen is soft.   Musculoskeletal:         General: Normal range of motion.      Cervical back: Normal range of motion.   Skin:     General: Skin is warm.   Neurological:      General: No focal deficit present.      Mental Status: She is alert.   Psychiatric:         Mood and Affect: Mood normal.         Laboratory  Last calcium was 11.1 on CMP  Last PTH was 143  No recent urine calcium    Diagnostic Results:  Sestamibi scan was reviewed.  There appears to be a right inferior parathyroid adenoma  No other parathyroid imaging  Patient may have had a DEXA scan but no results and patient does not think she has had this completed       Assessment and Plan   65-year-old female with hyperparathyroidism    PLAN:  It appears that patient may have a right inferior parathyroid adenoma.  Discussed basic indications for operative intervention but that I would defer additional management and surgical decision making to a endocrine surgeon.  A referral was placed to Dr. Cain                [1]   Current Outpatient Medications   Medication Sig Dispense Refill    acetaminophen (TYLENOL) 650 MG TbSR Take 1 tablet (650 mg total) by mouth every 8 (eight) hours. 30 tablet 0    albuterol  (PROVENTIL/VENTOLIN HFA) 90 mcg/actuation inhaler Inhale 1-2 puffs into the lungs every 6 (six) hours as needed for Wheezing. Rescue 18 g 0    amLODIPine (NORVASC) 5 MG tablet Take 1 tablet by mouth every morning.      aspirin (ECOTRIN) 81 MG EC tablet Take 1 tablet by mouth every morning.      cloNIDine (CATAPRES) 0.1 MG tablet Take 0.1 mg by mouth every 8 (eight) hours as needed.      cyanocobalamin 2,000 mcg Tab Take 2,000 mcg by mouth once daily. 30 tablet 5    fluticasone propionate (FLONASE) 50 mcg/actuation nasal spray 1 spray by Nasal route once daily.      furosemide (LASIX) 40 MG tablet Take 40 mg by mouth daily as needed.      hydroCHLOROthiazide (HYDRODIURIL) 25 MG tablet Take 25 mg by mouth every morning.      HYDROcodone-acetaminophen (NORCO) 5-325 mg per tablet Take 1 tablet by mouth every 6 (six) hours as needed for Pain. 30 tablet 0    losartan (COZAAR) 100 MG tablet Take 1 tablet (100 mg total) by mouth once daily. 30 tablet 5    metoprolol tartrate (LOPRESSOR) 25 MG tablet SMARTSI Tablet(s) By Mouth Morning-Night      potassium chloride SA (K-DUR,KLOR-CON) 20 MEQ tablet Take 20 mEq by mouth daily as needed.      senna-docusate 8.6-50 mg (PERICOLACE) 8.6-50 mg per tablet Take 1 tablet by mouth 2 (two) times a day. 60 tablet 1    hydrALAZINE (APRESOLINE) 25 MG tablet Take 25 mg by mouth every 8 (eight) hours. (Patient not taking: Reported on 2025)       No current facility-administered medications for this visit.   [2]   Social History  Tobacco Use    Smoking status: Never    Smokeless tobacco: Never   Substance Use Topics    Alcohol use: Not Currently    Drug use: Never

## 2025-03-05 ENCOUNTER — OFFICE VISIT (OUTPATIENT)
Dept: ORTHOPEDICS | Facility: CLINIC | Age: 65
End: 2025-03-05
Payer: MEDICARE

## 2025-03-05 VITALS — WEIGHT: 218.06 LBS | HEIGHT: 60 IN | BODY MASS INDEX: 42.81 KG/M2

## 2025-03-05 DIAGNOSIS — M50.30 DEGENERATIVE CERVICAL DISC: ICD-10-CM

## 2025-03-05 DIAGNOSIS — M54.12 CERVICAL RADICULOPATHY: ICD-10-CM

## 2025-03-05 DIAGNOSIS — M48.062 SPINAL STENOSIS OF LUMBAR REGION WITH NEUROGENIC CLAUDICATION: ICD-10-CM

## 2025-03-05 DIAGNOSIS — G95.89 CERVICAL CORD MYELOMALACIA: Primary | ICD-10-CM

## 2025-03-05 DIAGNOSIS — R29.898 RIGHT LEG WEAKNESS: ICD-10-CM

## 2025-03-05 DIAGNOSIS — M43.16 SPONDYLOLISTHESIS OF LUMBAR REGION: ICD-10-CM

## 2025-03-05 DIAGNOSIS — M47.816 FACET ARTHRITIS OF LUMBAR REGION: ICD-10-CM

## 2025-03-05 DIAGNOSIS — M48.02 CERVICAL STENOSIS OF SPINAL CANAL: ICD-10-CM

## 2025-03-05 DIAGNOSIS — M51.360 DEGENERATION OF INTERVERTEBRAL DISC OF LUMBAR REGION WITH DISCOGENIC BACK PAIN: ICD-10-CM

## 2025-03-05 PROCEDURE — 1159F MED LIST DOCD IN RCRD: CPT | Mod: CPTII,S$GLB,, | Performed by: ORTHOPAEDIC SURGERY

## 2025-03-05 PROCEDURE — 3288F FALL RISK ASSESSMENT DOCD: CPT | Mod: CPTII,S$GLB,, | Performed by: ORTHOPAEDIC SURGERY

## 2025-03-05 PROCEDURE — 1160F RVW MEDS BY RX/DR IN RCRD: CPT | Mod: CPTII,S$GLB,, | Performed by: ORTHOPAEDIC SURGERY

## 2025-03-05 PROCEDURE — 3008F BODY MASS INDEX DOCD: CPT | Mod: CPTII,S$GLB,, | Performed by: ORTHOPAEDIC SURGERY

## 2025-03-05 PROCEDURE — 99999 PR PBB SHADOW E&M-EST. PATIENT-LVL III: CPT | Mod: PBBFAC,,, | Performed by: ORTHOPAEDIC SURGERY

## 2025-03-05 PROCEDURE — 1126F AMNT PAIN NOTED NONE PRSNT: CPT | Mod: CPTII,S$GLB,, | Performed by: ORTHOPAEDIC SURGERY

## 2025-03-05 PROCEDURE — 99213 OFFICE O/P EST LOW 20 MIN: CPT | Mod: S$GLB,,, | Performed by: ORTHOPAEDIC SURGERY

## 2025-03-05 PROCEDURE — 1101F PT FALLS ASSESS-DOCD LE1/YR: CPT | Mod: CPTII,S$GLB,, | Performed by: ORTHOPAEDIC SURGERY

## 2025-03-05 NOTE — PROGRESS NOTES
Subjective:       Patient ID: Paola Ibanez is a 65 y.o. female.    Chief Complaint: Pain of the Neck (MRI Cervical results, she denies neck and arm pain, right arm feels heavy) and Pain of the Lumbar Spine (Says PT did not get order, she hasn't started therapy as of yet, denies lumbar pain, c/o right drop foot,  right leg is worse than left, )      History of Present Illness    Prior to meeting with the patient I reviewed the medical chart in [facility]. This included reviewing the previous progress notes from our office, review of the patient's last appointment with their primary care provider, review of any visits to the emergency room, and review of any pain management appointments or procedures.      Patient returns for follow-up evaluation she had an epidural steroid injection that only helped her symptoms minimally she has 2 different complaints jumping in her legs weakness of the right leg and difficulty with walking she did see a surgeon in the past who told her that she had to have 5 disc removed from the cervical spine and this was a problem she has minimal neck pain she notes numbness in the right arm but had a fractured arm and a lisbeth put in it and noted numbness after this.  No bowel or bladder incontinence  Her main complaint today is weakness in her right foot and stumbling over her right foot  He is here to discuss cervical MRI results    Current Medications  Current Medications[1]    Allergies  Review of patient's allergies indicates:   Allergen Reactions    Aspirin Nausea Only     Stomach Pain  Stomach Pain      Lisinopril Other (See Comments)     Cough  Cough      Penicillin g potassium Rash     Rash  Rash         Past Medical History  Past Medical History:   Diagnosis Date    A-fib     Abnormal SPEP 03/21/2024    History of arm fracture 03/19/2024    Hypertension     Monoclonal paraproteinemia 03/21/2024    Normochromic normocytic anemia 03/19/2024       Surgical History  Past Surgical History:    Procedure Laterality Date    CAUDAL EPIDURAL STEROID INJECTION N/A 1/3/2025    Procedure: Injection-steroid-epidural-caudal with catheter;  Surgeon: Vega Jama MD;  Location: [facility];  Service: Pain Management;  Laterality: N/A;    HYSTERECTOMY      INSERTION OF INTRAMEDULLARY ROBIN Right 2/26/2024    Procedure: INSERTION, INTRAMEDULLARY ROBIN;  Surgeon: Luis Gonzalez MD;  Location: [facility] OR;  Service: Orthopedics;  Laterality: Right;  Ken notified 2/26/24 ark       Family History:   Family History   Problem Relation Name Age of Onset    Seizures Sister      Breast cancer Mother         Social History:   Social History[2]    Hospitalization/Major Diagnostic Procedure:     Review of Systems     General/Constitutional:  Chills denies. Fatigue denies. Fever denies. Weight gain denies. Weight loss denies.    Respiratory:  Shortness of breath denies.    Cardiovascular:  Chest pain denies.    Gastrointestinal:  Constipation denies. Diarrhea denies. Nausea denies. Vomiting denies.     Hematology:  Easy bruising denies. Prolonged bleeding denies.     Genitourinary:  Frequent urination denies. Pain in lower back denies. Painful urination denies.     Musculoskeletal:  See HPI for details    Skin:  Rash denies.    Neurologic:  Dizziness denies. Gait abnormalities denies. Seizures denies. Tingling/Numbess denies.    Psychiatric:  Anxiety denies. Depressed mood denies.     Objective:   Vital Signs: There were no vitals filed for this visit.     Physical Exam      General Examination:     Constitutional: The patient is alert and oriented to lace person and time. Mood is pleasant.     Head/Face: Normal facial features normal eyebrows    Eyes: Normal extraocular motion bilaterally    Lungs: Respirations are equal and unlabored    Gait is coordinated.    Cardiovascular: There are no swelling or varicosities present.    Lymphatic: Negative for adenopathy    Skin: Normal    Neurological: Level of consciousness normal.  Oriented to place person and time and situation    Psychiatric: Oriented to time place person and situation    Physical examination shows grade 3 5 weakness of right ankle dorsiflexor compared to left she has hyperreflexia in the white right patella and right biceps compared to the left Manny test is positive on the right.  These are the objective findings  XRAY Report/ Interpretation:  Cervical MRI 2023 was compared to most updated MRI in 2025 I compared the 2 and I agree with the radiologist's interpretation that there is not any significant change in the findings over this time interval  I have also reviewed her lumbar MRI showing degenerative spondylolisthesis at L4-5 with significant central stenosis         MRI Cervical Spine Without Contrast  Order: [id number]   Status: Final result       Next appt: None       Dx: Spinal stenosis in cervical region; C...    Test Result Released: Yes (not seen)    0 Result Notes  Details    Reading Physician Reading Date Result Priority   Ne Washington MD  [phone]  2/18/2025 Routine     Narrative & Impression  EXAMINATION:  MRI CERVICAL SPINE WITHOUT CONTRAST     CLINICAL HISTORY:  cervical radiculopathy; Radiculopathy, cervical region     TECHNIQUE:  Multiplanar, multisequence MR images of the cervical spine were performed without the administration of contrast.     COMPARISON:  07/03/2023     FINDINGS:  Alignment: Straightening of the normal lordotic curvature of the cervical spine.     Vertebrae: Vertebral bodies are of normal height with no marrow edema or fracture there are large flowing anterior osteophytes     Discs: There is diffuse disc desiccation with degenerative endplate changes at C2-3 and C3-4.  There is mild disc space narrowing at C5-6 and C6-7.     Cord: Stable linear increased T2 signal intensity within the central and right side of the spinal cord at C3 and C4 sagittal series 5 image 7 through 9.  The cord is not enlarged.  This cerebellar  tonsils are in normal location.     Skull base and craniocervical junction: Normal.     Degenerative findings:     C2-C3: Osteophytic disc complex and uncinate process hypertrophy resulting in mild foraminal narrowing     C3-C4: 5th osteophytic disc complex with disc space narrowing.  There is facet hypertrophy and uncinate process hypertrophy.  Findings result in moderate central canal stenosis and bilateral foraminal narrowing, right greater than left which is stable.  There is abnormal signal intensity in the right side of the spinal cord at this level.     C4-C5: Osteophytic disc complex and facet hypertrophy resulting in moderate central canal stenosis and bilateral foraminal narrowing right greater than left which is stable     C5-C6: Disc space narrowing without central canal stenosis or foraminal narrowing     C6-C7: Shallow disc bulge resulting in bilateral foraminal narrowing left greater than right which is stable     C7-T1: No significant abnormality     Paraspinal muscles & soft tissues: Unremarkable.     Impression:     Stable advanced multilevel degenerative disc disease and facet hypertrophy most pronounced at C3-4 and C4-5     Abnormal signal intensity within the right half of the spinal cord at the C3-4 level compatible with myelomalacia which is stable        Electronically signed by:Ne Washington  Date:                                            02/18/2025         Assessment:       1. Cervical cord myelomalacia    2. Spinal stenosis of lumbar region with neurogenic claudication    3. Spondylolisthesis of lumbar region    4. Degeneration of intervertebral disc of lumbar region with discogenic back pain    5. Right leg weakness    6. Facet arthritis of lumbar region    7. Cervical stenosis of spinal canal    8. Cervical radiculopathy    9. Degenerative cervical disc        Plan:       Paola was seen today for pain and pain.    Diagnoses and all orders for this visit:    Cervical cord  myelomalacia    Spinal stenosis of lumbar region with neurogenic claudication    Spondylolisthesis of lumbar region    Degeneration of intervertebral disc of lumbar region with discogenic back pain    Right leg weakness    Facet arthritis of lumbar region    Cervical stenosis of spinal canal    Cervical radiculopathy    Degenerative cervical disc         No follow-ups on file.    I believe the weakness in her dorsiflexion of her right foot is from the stenosis at the L4-5 level I believe her other findings meeting hyperreflexia on the right side upper and lower extremities and positive Manny's on the right is caused by her myelomalacia and cervical stenosis I have advised that she consider having surgery on the cervical spine she was previously told that she would have to have 5 disc removed by another surgeon but I would blink the best surgery for her to have with the myelomalacia in upper cervical cord would likely be a posterior cervical laminectomy and instrumented fusion.  I have explained to her that I do not do this type of surgery anymore but I would refer her to someone that I trusted to do this type of surgery if she is interested.  I have advised that lumbar surgery would only accomplished some improvement in her dorsiflexion of her foot but not likely her neurologic changes otherwise    She wants to think about it and try a course of physical therapy we previously sent an order for therapy but she was never called.  I have warned about the possible risk of progression of her symptoms.  She seems to be afraid of the idea of surgery which is understandable I have also advised that if she has surgery more likely not she needs to go to rehab after surgery rather than be treated with outpatient or home physical therapy.  She is concerned about the financial cost.  I explained this is understandable.        The risk associated with the spinal condition and an worsening of the condition was discussed with  the patient expressed a thorough understanding.  The patient was warned about the possible development of cauda equina syndrome and its symptoms which include but are not limited to bowel or bladder dysfunction sexual dysfunction increasing pain increasing extremity numbness or weakness and saddle anesthesia.  The patient was advised to either contact me immediately or to go to the nearest hospital emergency room if symptoms of cauda equina syndrome with to develop.  The patient expressed an understanding of these instructions.      Treatment options were discussed with regards to the nature of the medical condition. Conservative pain intervention and surgical options were discussed in detail. The probability of success of each separate treatment option was discussed. The patient expressed a clear understanding of the treatment options. With regards to surgery, the procedure risk, benefits, complications, and outcomes were discussed. No guarantees were given with regards to surgical outcome.   The risk of complications, morbidity, and mortality of patient management decisions have been made at the time of this visit. These are associated with the patient's problems, diagnostic procedures and treatment options. This includes the possible management options selected and those considered but not selected by the patient after shared medical decision making we discussed with the patient.     This note was created using Dragon voice recognition software that occasionally misinterpreted phrases or words.         [1]   Current Outpatient Medications   Medication Sig Dispense Refill    acetaminophen (TYLENOL) 650 MG TbSR Take 1 tablet (650 mg total) by mouth every 8 (eight) hours. 30 tablet 0    albuterol (PROVENTIL/VENTOLIN HFA) 90 mcg/actuation inhaler Inhale 1-2 puffs into the lungs every 6 (six) hours as needed for Wheezing. Rescue 18 g 0    amLODIPine (NORVASC) 5 MG tablet Take 1 tablet by mouth every morning.       aspirin (ECOTRIN) 81 MG EC tablet Take 1 tablet by mouth every morning.      cloNIDine (CATAPRES) 0.1 MG tablet Take 0.1 mg by mouth every 8 (eight) hours as needed.      cyanocobalamin 2,000 mcg Tab Take 2,000 mcg by mouth once daily. 30 tablet 5    fluticasone propionate (FLONASE) 50 mcg/actuation nasal spray 1 spray by Nasal route once daily.      furosemide (LASIX) 40 MG tablet Take 40 mg by mouth daily as needed.      hydrALAZINE (APRESOLINE) 25 MG tablet Take 25 mg by mouth every 8 (eight) hours.      hydroCHLOROthiazide (HYDRODIURIL) 25 MG tablet Take 25 mg by mouth every morning.      HYDROcodone-acetaminophen (NORCO) 5-325 mg per tablet Take 1 tablet by mouth every 6 (six) hours as needed for Pain. 30 tablet 0    losartan (COZAAR) 100 MG tablet Take 1 tablet (100 mg total) by mouth once daily. 30 tablet 5    metoprolol tartrate (LOPRESSOR) 25 MG tablet SMARTSI Tablet(s) By Mouth Morning-Night      potassium chloride SA (K-DUR,KLOR-CON) 20 MEQ tablet Take 20 mEq by mouth daily as needed.      senna-docusate 8.6-50 mg (PERICOLACE) 8.6-50 mg per tablet Take 1 tablet by mouth 2 (two) times a day. 60 tablet 1     No current facility-administered medications for this visit.   [2]   Social History  Socioeconomic History    Marital status: Single   Tobacco Use    Smoking status: Never    Smokeless tobacco: Never   Substance and Sexual Activity    Alcohol use: Not Currently    Drug use: Never

## 2025-03-07 ENCOUNTER — TELEPHONE (OUTPATIENT)
Dept: SURGERY | Facility: CLINIC | Age: 65
End: 2025-03-07
Payer: MEDICARE

## 2025-04-16 ENCOUNTER — OFFICE VISIT (OUTPATIENT)
Dept: ORTHOPEDICS | Facility: CLINIC | Age: 65
End: 2025-04-16
Payer: MEDICARE

## 2025-04-16 VITALS
HEIGHT: 60 IN | SYSTOLIC BLOOD PRESSURE: 138 MMHG | WEIGHT: 218.06 LBS | BODY MASS INDEX: 42.81 KG/M2 | DIASTOLIC BLOOD PRESSURE: 82 MMHG

## 2025-04-16 DIAGNOSIS — M51.360 DEGENERATION OF INTERVERTEBRAL DISC OF LUMBAR REGION WITH DISCOGENIC BACK PAIN: ICD-10-CM

## 2025-04-16 DIAGNOSIS — M50.30 DEGENERATIVE CERVICAL DISC: ICD-10-CM

## 2025-04-16 DIAGNOSIS — R29.898 RIGHT LEG WEAKNESS: ICD-10-CM

## 2025-04-16 DIAGNOSIS — G95.89 CERVICAL CORD MYELOMALACIA: Primary | ICD-10-CM

## 2025-04-16 DIAGNOSIS — M48.062 SPINAL STENOSIS OF LUMBAR REGION WITH NEUROGENIC CLAUDICATION: ICD-10-CM

## 2025-04-16 DIAGNOSIS — M48.02 CERVICAL STENOSIS OF SPINAL CANAL: ICD-10-CM

## 2025-04-16 DIAGNOSIS — M43.16 SPONDYLOLISTHESIS OF LUMBAR REGION: ICD-10-CM

## 2025-04-16 PROCEDURE — 3075F SYST BP GE 130 - 139MM HG: CPT | Mod: CPTII,S$GLB,, | Performed by: ORTHOPAEDIC SURGERY

## 2025-04-16 PROCEDURE — 3008F BODY MASS INDEX DOCD: CPT | Mod: CPTII,S$GLB,, | Performed by: ORTHOPAEDIC SURGERY

## 2025-04-16 PROCEDURE — 1101F PT FALLS ASSESS-DOCD LE1/YR: CPT | Mod: CPTII,S$GLB,, | Performed by: ORTHOPAEDIC SURGERY

## 2025-04-16 PROCEDURE — 1159F MED LIST DOCD IN RCRD: CPT | Mod: CPTII,S$GLB,, | Performed by: ORTHOPAEDIC SURGERY

## 2025-04-16 PROCEDURE — 1160F RVW MEDS BY RX/DR IN RCRD: CPT | Mod: CPTII,S$GLB,, | Performed by: ORTHOPAEDIC SURGERY

## 2025-04-16 PROCEDURE — 3079F DIAST BP 80-89 MM HG: CPT | Mod: CPTII,S$GLB,, | Performed by: ORTHOPAEDIC SURGERY

## 2025-04-16 PROCEDURE — 99999 PR PBB SHADOW E&M-EST. PATIENT-LVL IV: CPT | Mod: PBBFAC,,, | Performed by: ORTHOPAEDIC SURGERY

## 2025-04-16 PROCEDURE — 1126F AMNT PAIN NOTED NONE PRSNT: CPT | Mod: CPTII,S$GLB,, | Performed by: ORTHOPAEDIC SURGERY

## 2025-04-16 PROCEDURE — 99213 OFFICE O/P EST LOW 20 MIN: CPT | Mod: S$GLB,,, | Performed by: ORTHOPAEDIC SURGERY

## 2025-04-16 PROCEDURE — 3288F FALL RISK ASSESSMENT DOCD: CPT | Mod: CPTII,S$GLB,, | Performed by: ORTHOPAEDIC SURGERY

## 2025-04-16 NOTE — PROGRESS NOTES
Subjective:      Patient ID: Paola Ibanez is a 65 y.o. female.     Chief Complaint: Pain of the Spine (6 week f/u - cervical & lumbar doing good pain is better than before, PT is going good and feels like its helping; no new concerns ) and Pain of the Neck       History of Present Illness     Prior to meeting with the patient I reviewed the medical chart in [facility]. This included reviewing the previous progress notes from our office, review of the patient's last appointment with their primary care provider, review of any visits to the emergency room, and review of any pain management appointments or procedures.  65-year-old female following up for cervical and lumbar spine pain.  Since her last visit, she has been attending aquatherapy which he feels has been helping significantly with improving her lower extremity strength and balance.  She states that she currently experiences no pain in her cervical and lumbar spine.  She does mention constant paresthesias in her right upper extremity.  She denies any bladder or bowel incontinence.     Current Medications  Current Medications[1]     Allergies  Review of patient's allergies indicates:   Allergen Reactions    Aspirin Nausea Only     Stomach Pain  Stomach Pain      Lisinopril Other (See Comments)     Cough  Cough      Penicillin g potassium Rash     Rash  Rash          Past Medical History  Past Medical History:   Diagnosis Date    A-fib     Abnormal SPEP 03/21/2024    History of arm fracture 03/19/2024    Hypertension     Monoclonal paraproteinemia 03/21/2024    Normochromic normocytic anemia 03/19/2024        Surgical History  Past Surgical History:   Procedure Laterality Date    CAUDAL EPIDURAL STEROID INJECTION N/A 1/3/2025    Procedure: Injection-steroid-epidural-caudal with catheter;  Surgeon: Vega Jama MD;  [facility];  Service: Pain Management;  Laterality: N/A;    HYSTERECTOMY      INSERTION OF INTRAMEDULLARY ROBIN Right 2/26/2024     Procedure: INSERTION, INTRAMEDULLARY ROBIN;  Surgeon: Luis Gonzalez MD;  Location: [facility];  Service: Orthopedics;  Laterality: Right;  Ken notified 2/26/24 jhonatan        Family History:  Family History   Problem Relation Name Age of Onset    Seizures Sister      Breast cancer Mother          Social History:  Social History[2]     Hospitalization/Major Diagnostic Procedure:     Review of Systems      General/Constitutional:  Chills denies. Fatigue denies. Fever denies. Weight gain denies. Weight loss denies.     Respiratory:  Shortness of breath denies.     Cardiovascular:  Chest pain denies.     Gastrointestinal:  Constipation denies. Diarrhea denies. Nausea denies. Vomiting denies.     Hematology:  Easy bruising denies. Prolonged bleeding denies.     Genitourinary:  Frequent urination denies. Pain in lower back denies. Painful urination denies.     Musculoskeletal:  See HPI for details     Skin:  Rash denies.     Neurologic:  Dizziness denies. Gait abnormalities denies. Seizures denies. Tingling/Numbess denies.     Psychiatric:  Anxiety denies. Depressed mood denies.     Objective:  Vital Signs:   Vitals:    04/16/25 0754   BP: (!) 176/96        Physical Exam      General Examination:     Constitutional: The patient is alert and oriented to lace person and time. Mood is pleasant.     Head/Face: Normal facial features normal eyebrows     Eyes: Normal extraocular motion bilaterally     Lungs: Respirations are equal and unlabored     Gait is coordinated.     Cardiovascular: There are no swelling or varicosities present.     Lymphatic: Negative for adenopathy     Skin: Normal     Neurological: Level of consciousness normal. Oriented to place person and time and situation     Psychiatric: Oriented to time place person and situation     Cervical spine exam: Skin overlying the cervical spine is clean dry and intact.  No erythema or ecchymosis.  No signs or symptoms of infection.  No tenderness to paraspinal muscles of  the cervical spine.  Good range of motion cervical spine with flexion-extension, rotation, lateral bending with no subjective increase in pain.  Negative Spurling's bilaterally.  Distally neurovascularly intact.    Lumbar spine exam:  Skin overlying the lumbar spine is clean dry and intact.  No erythema or ecchymosis.  No signs or symptoms of infection.  No tenderness to paraspinal muscles of the lumbar spine.  Negative Homans bilaterally.  Negative straight leg raise test bilaterally.  Distally neurovascularly intact.  She presents today in a wheelchair.    XRAY Report/ Interpretation:  No new radiographs taken at today's visit.        Assessment:     1. Cervical cord myelomalacia    2. Cervical stenosis of spinal canal    3. Degenerative cervical disc    4. Spinal stenosis of lumbar region with neurogenic claudication    5. Spondylolisthesis of lumbar region    6. Degeneration of intervertebral disc of lumbar region with discogenic back pain    7. Right leg weakness       Plan:     Paola was seen today for pain and pain.    Diagnoses and all orders for this visit:    Cervical cord myelomalacia    Cervical stenosis of spinal canal    Degenerative cervical disc  -     Ambulatory Referral/Consult to Physical Therapy; Future    Spinal stenosis of lumbar region with neurogenic claudication    Spondylolisthesis of lumbar region    Degeneration of intervertebral disc of lumbar region with discogenic back pain  -     Ambulatory Referral/Consult to Physical Therapy; Future    Right leg weakness  -     Ambulatory Referral/Consult to Physical Therapy; Future        Follow up for 2-3 month f/u - cervical & lumbar, PT f/u.   This is to attest that the physician's assistant Sivakumar Fiore served in the capacity as a scribe for this patient's encounter.  This is also to verify that I have reviewed the patient's history and helped formulate the treatment plan and discussed it with the physician's assistant.  I have actively  participated in the evaluation and treatment plan for this patient.  The visit plan and medical decision-making is as outlined below    She has been progressing well after performing aqua therapy for the past 6 weeks.  We discussed her treatment options today whether it be to manage her symptoms conservatively with continuing physical therapy versus surgical intervention if she is not satisfied with the relief she gets from therapy.  She states that her lower extremity weakness and gait has been steadily improving with therapy.  I would like her to continue with formal physical therapy to continue with lower extremity strengthening and balance.  I would like her to return in 2 months to reassess her progress.  Treatment options were discussed with regards to the nature of the medical condition. Conservative pain intervention and surgical options were discussed in detail. The probability of success of each separate treatment option was discussed. The patient expressed a clear understanding of the treatment options. With regards to surgery, the procedure risk, benefits, complications, and outcomes were discussed. No guarantees were given with regards to surgical outcome.  The risk of complications, morbidity, and mortality of patient management decisions have been made at the time of this visit. These are associated with the patient's problems, diagnostic procedures and treatment options. This includes the possible management options selected and those considered but not selected by the patient after shared medical decision making we discussed with the patient.     This note was created using Dragon voice recognition software that occasionally misinterpreted phrases or words.         [1]   Current Outpatient Medications   Medication Sig Dispense Refill    acetaminophen (TYLENOL) 650 MG TbSR Take 1 tablet (650 mg total) by mouth every 8 (eight) hours. 30 tablet 0    amLODIPine (NORVASC) 5 MG tablet Take 1 tablet by  mouth every morning.      aspirin (ECOTRIN) 81 MG EC tablet Take 1 tablet by mouth every morning.      cloNIDine (CATAPRES) 0.1 MG tablet Take 0.1 mg by mouth every 8 (eight) hours as needed.      fluticasone propionate (FLONASE) 50 mcg/actuation nasal spray 1 spray by Nasal route once daily.      furosemide (LASIX) 40 MG tablet Take 40 mg by mouth daily as needed.      losartan (COZAAR) 100 MG tablet Take 1 tablet (100 mg total) by mouth once daily. 30 tablet 5    metoprolol tartrate (LOPRESSOR) 25 MG tablet SMARTSI Tablet(s) By Mouth Morning-Night      potassium chloride SA (K-DUR,KLOR-CON) 20 MEQ tablet Take 20 mEq by mouth daily as needed.      albuterol (PROVENTIL/VENTOLIN HFA) 90 mcg/actuation inhaler Inhale 1-2 puffs into the lungs every 6 (six) hours as needed for Wheezing. Rescue (Patient not taking: Reported on 2025) 18 g 0    cyanocobalamin 2,000 mcg Tab Take 2,000 mcg by mouth once daily. (Patient not taking: Reported on 2025) 30 tablet 5    hydrALAZINE (APRESOLINE) 25 MG tablet Take 25 mg by mouth every 8 (eight) hours. (Patient not taking: Reported on 2025)      hydroCHLOROthiazide (HYDRODIURIL) 25 MG tablet Take 25 mg by mouth every morning. (Patient not taking: Reported on 2025)      HYDROcodone-acetaminophen (NORCO) 5-325 mg per tablet Take 1 tablet by mouth every 6 (six) hours as needed for Pain. (Patient not taking: Reported on 2025) 30 tablet 0    senna-docusate 8.6-50 mg (PERICOLACE) 8.6-50 mg per tablet Take 1 tablet by mouth 2 (two) times a day. (Patient not taking: Reported on 2025) 60 tablet 1     No current facility-administered medications for this visit.   [2]   Social History  Socioeconomic History    Marital status: Single   Tobacco Use    Smoking status: Never    Smokeless tobacco: Never   Substance and Sexual Activity    Alcohol use: Not Currently    Drug use: Never

## 2025-06-03 DIAGNOSIS — N32.81 DETRUSOR INSTABILITY OF BLADDER: Primary | ICD-10-CM

## 2025-06-30 DIAGNOSIS — D47.2 MONOCLONAL PARAPROTEINEMIA: Primary | ICD-10-CM

## 2025-07-01 ENCOUNTER — TELEPHONE (OUTPATIENT)
Facility: CLINIC | Age: 65
End: 2025-07-01
Payer: MEDICARE

## 2025-07-16 ENCOUNTER — OFFICE VISIT (OUTPATIENT)
Dept: ORTHOPEDICS | Facility: CLINIC | Age: 65
End: 2025-07-16
Payer: MEDICARE

## 2025-07-16 VITALS — HEIGHT: 60 IN | BODY MASS INDEX: 42.58 KG/M2

## 2025-07-16 DIAGNOSIS — M48.062 SPINAL STENOSIS OF LUMBAR REGION WITH NEUROGENIC CLAUDICATION: ICD-10-CM

## 2025-07-16 DIAGNOSIS — M43.16 SPONDYLOLISTHESIS OF LUMBAR REGION: ICD-10-CM

## 2025-07-16 DIAGNOSIS — M51.360 DEGENERATION OF INTERVERTEBRAL DISC OF LUMBAR REGION WITH DISCOGENIC BACK PAIN: ICD-10-CM

## 2025-07-16 DIAGNOSIS — M48.02 CERVICAL STENOSIS OF SPINAL CANAL: ICD-10-CM

## 2025-07-16 DIAGNOSIS — R29.898 RIGHT LEG WEAKNESS: ICD-10-CM

## 2025-07-16 DIAGNOSIS — M50.30 DEGENERATIVE CERVICAL DISC: ICD-10-CM

## 2025-07-16 DIAGNOSIS — G95.89 CERVICAL CORD MYELOMALACIA: Primary | ICD-10-CM

## 2025-07-16 PROCEDURE — 4010F ACE/ARB THERAPY RXD/TAKEN: CPT | Mod: CPTII,S$GLB,, | Performed by: ORTHOPAEDIC SURGERY

## 2025-07-16 PROCEDURE — 1160F RVW MEDS BY RX/DR IN RCRD: CPT | Mod: CPTII,S$GLB,, | Performed by: ORTHOPAEDIC SURGERY

## 2025-07-16 PROCEDURE — 3008F BODY MASS INDEX DOCD: CPT | Mod: CPTII,S$GLB,, | Performed by: ORTHOPAEDIC SURGERY

## 2025-07-16 PROCEDURE — 99213 OFFICE O/P EST LOW 20 MIN: CPT | Mod: S$GLB,,, | Performed by: ORTHOPAEDIC SURGERY

## 2025-07-16 PROCEDURE — 1159F MED LIST DOCD IN RCRD: CPT | Mod: CPTII,S$GLB,, | Performed by: ORTHOPAEDIC SURGERY

## 2025-07-16 PROCEDURE — 3288F FALL RISK ASSESSMENT DOCD: CPT | Mod: CPTII,S$GLB,, | Performed by: ORTHOPAEDIC SURGERY

## 2025-07-16 PROCEDURE — 1101F PT FALLS ASSESS-DOCD LE1/YR: CPT | Mod: CPTII,S$GLB,, | Performed by: ORTHOPAEDIC SURGERY

## 2025-07-16 PROCEDURE — 99999 PR PBB SHADOW E&M-EST. PATIENT-LVL III: CPT | Mod: PBBFAC,,, | Performed by: ORTHOPAEDIC SURGERY

## 2025-07-16 NOTE — PROGRESS NOTES
Subjective:       Patient ID: Paola Ibanez is a 65 y.o. female.    Chief Complaint: Pain of the Lumbar Spine (Patient feeling good overall, has some tightness in lower back and weakness but no real pain. Aqua therapy has helped her. Walking around her house well.) and Pain of the Neck (Neck feels okay,. No real pain. )      History of Present Illness    Prior to meeting with the patient I reviewed the medical chart in [facility]. This included reviewing the previous progress notes from our office, review of the patient's last appointment with their primary care provider, review of any visits to the emergency room, and review of any pain management appointments or procedures.   Follow-up neck and back pain has been going to aqua therapy and claims that it has made her pain much improved.  Patient ambulatory with a walker at home claims she has no difficulties    Current Medications  Current Medications[1]    Allergies  Review of patient's allergies indicates:   Allergen Reactions    Aspirin Nausea Only     Stomach Pain  Stomach Pain      Lisinopril Other (See Comments)     Cough  Cough      Penicillin g potassium Rash     Rash  Rash         Past Medical History  Past Medical History:   Diagnosis Date    A-fib     Abnormal SPEP 03/21/2024    History of arm fracture 03/19/2024    Hypertension     Monoclonal paraproteinemia 03/21/2024    Normochromic normocytic anemia 03/19/2024       Surgical History  Past Surgical History:   Procedure Laterality Date    CAUDAL EPIDURAL STEROID INJECTION N/A 1/3/2025    Procedure: Injection-steroid-epidural-caudal with catheter;  Surgeon: Vega Jama MD;  Location: [facility] OR;  Service: Pain Management;  Laterality: N/A;    HYSTERECTOMY      INSERTION OF INTRAMEDULLARY ROBIN Right 2/26/2024    Procedure: INSERTION, INTRAMEDULLARY ROBIN;  Surgeon: Luis Gonzalez MD;  Location: [facility] OR;  Service: Orthopedics;  Laterality: Right;  Ken notified 2/26/24 jhonatan       Family History:    Family History   Problem Relation Name Age of Onset    Seizures Sister      Breast cancer Mother         Social History:   Social History[2]    Hospitalization/Major Diagnostic Procedure:     Review of Systems     General/Constitutional:  Chills denies. Fatigue denies. Fever denies. Weight gain denies. Weight loss denies.    Respiratory:  Shortness of breath denies.    Cardiovascular:  Chest pain denies.    Gastrointestinal:  Constipation denies. Diarrhea denies. Nausea denies. Vomiting denies.     Hematology:  Easy bruising denies. Prolonged bleeding denies.     Genitourinary:  Frequent urination denies. Pain in lower back denies. Painful urination denies.     Musculoskeletal:  See HPI for details    Skin:  Rash denies.    Neurologic:  Dizziness denies. Gait abnormalities denies. Seizures denies. Tingling/Numbess denies.    Psychiatric:  Anxiety denies. Depressed mood denies.     Objective:   Vital Signs:   Vitals:    07/16/25 0801   Pulse: (!) 0        Physical Exam      General Examination:     Constitutional: The patient is alert and oriented to lace person and time. Mood is pleasant.     Head/Face: Normal facial features normal eyebrows    Eyes: Normal extraocular motion bilaterally    Lungs: Respirations are equal and unlabored    Gait is coordinated.    Cardiovascular: There are no swelling or varicosities present.    Lymphatic: Negative for adenopathy    Skin: Normal    Neurological: Level of consciousness normal. Oriented to place person and time and situation    Psychiatric: Oriented to time place person and situation    Manny test negative cervical range of motion mildly restricted Spurling's maneuver negative  strength slightly weak on the right side due to previous cerebrovascular accident lumbar exam pain with extension and bending range of motion limited  XRAY Report/ Interpretation:  Prior cervical and lumbar MRI studies reviewed      Assessment:       1. Cervical cord myelomalacia    2.  Cervical stenosis of spinal canal    3. Degenerative cervical disc    4. Spinal stenosis of lumbar region with neurogenic claudication    5. Spondylolisthesis of lumbar region    6. Degeneration of intervertebral disc of lumbar region with discogenic back pain    7. Right leg weakness        Plan:       Paola was seen today for pain and pain.    Diagnoses and all orders for this visit:    Cervical cord myelomalacia    Cervical stenosis of spinal canal    Degenerative cervical disc    Spinal stenosis of lumbar region with neurogenic claudication    Spondylolisthesis of lumbar region    Degeneration of intervertebral disc of lumbar region with discogenic back pain    Right leg weakness         No follow-ups on file.    Treatment options discussed patient feels he is clinically much better at this point in time and prefers continued conservative treatment she will continue with aquatic therapy I have asked her to discuss with the physical therapy to the possibility of doing conventional physical therapy at a later date.  The risk associated with the spinal condition and an worsening of the condition was discussed with the patient expressed a thorough understanding.  The patient was warned about the possible development of cauda equina syndrome and its symptoms which include but are not limited to bowel or bladder dysfunction sexual dysfunction increasing pain increasing extremity numbness or weakness and saddle anesthesia.  The patient was advised to either contact me immediately or to go to the nearest hospital emergency room if symptoms of cauda equina syndrome with to develop.  The patient expressed an understanding of these instructions.    Return 3 months with cervical and lumbar x-ray  Treatment options were discussed with regards to the nature of the medical condition. Conservative pain intervention and surgical options were discussed in detail. The probability of success of each separate treatment option was  discussed. The patient expressed a clear understanding of the treatment options. With regards to surgery, the procedure risk, benefits, complications, and outcomes were discussed. No guarantees were given with regards to surgical outcome.   The risk of complications, morbidity, and mortality of patient management decisions have been made at the time of this visit. These are associated with the patient's problems, diagnostic procedures and treatment options. This includes the possible management options selected and those considered but not selected by the patient after shared medical decision making we discussed with the patient.     This note was created using Dragon voice recognition software that occasionally misinterpreted phrases or words.         [1]   Current Outpatient Medications   Medication Sig Dispense Refill    acetaminophen (TYLENOL) 650 MG TbSR Take 1 tablet (650 mg total) by mouth every 8 (eight) hours. 30 tablet 0    albuterol (PROVENTIL/VENTOLIN HFA) 90 mcg/actuation inhaler Inhale 1-2 puffs into the lungs every 6 (six) hours as needed for Wheezing. Rescue (Patient not taking: Reported on 4/16/2025) 18 g 0    amLODIPine (NORVASC) 5 MG tablet Take 1 tablet by mouth every morning.      aspirin (ECOTRIN) 81 MG EC tablet Take 1 tablet by mouth every morning.      cloNIDine (CATAPRES) 0.1 MG tablet Take 0.1 mg by mouth every 8 (eight) hours as needed.      cyanocobalamin 2,000 mcg Tab Take 2,000 mcg by mouth once daily. (Patient not taking: Reported on 4/16/2025) 30 tablet 5    fluticasone propionate (FLONASE) 50 mcg/actuation nasal spray 1 spray by Nasal route once daily.      furosemide (LASIX) 40 MG tablet Take 40 mg by mouth daily as needed.      hydrALAZINE (APRESOLINE) 25 MG tablet Take 25 mg by mouth every 8 (eight) hours. (Patient not taking: Reported on 4/16/2025)      hydroCHLOROthiazide (HYDRODIURIL) 25 MG tablet Take 25 mg by mouth every morning. (Patient not taking: Reported on 4/16/2025)       HYDROcodone-acetaminophen (NORCO) 5-325 mg per tablet Take 1 tablet by mouth every 6 (six) hours as needed for Pain. (Patient not taking: Reported on 2025) 30 tablet 0    losartan (COZAAR) 100 MG tablet Take 1 tablet (100 mg total) by mouth once daily. 30 tablet 5    metoprolol tartrate (LOPRESSOR) 25 MG tablet SMARTSI Tablet(s) By Mouth Morning-Night      potassium chloride SA (K-DUR,KLOR-CON) 20 MEQ tablet Take 20 mEq by mouth daily as needed.      senna-docusate 8.6-50 mg (PERICOLACE) 8.6-50 mg per tablet Take 1 tablet by mouth 2 (two) times a day. (Patient not taking: Reported on 2025) 60 tablet 1     No current facility-administered medications for this visit.   [2]   Social History  Socioeconomic History    Marital status: Single   Tobacco Use    Smoking status: Never    Smokeless tobacco: Never   Substance and Sexual Activity    Alcohol use: Not Currently    Drug use: Never

## 2025-07-17 ENCOUNTER — TELEPHONE (OUTPATIENT)
Facility: CLINIC | Age: 65
End: 2025-07-17
Payer: MEDICARE

## 2025-07-24 PROBLEM — D51.0 PERNICIOUS ANEMIA: Status: ACTIVE | Noted: 2025-07-24

## 2025-09-04 ENCOUNTER — HOSPITAL ENCOUNTER (OUTPATIENT)
Dept: RADIOLOGY | Facility: HOSPITAL | Age: 65
Discharge: HOME | End: 2025-09-04
Attending: SPECIALIST
Payer: MEDICARE

## 2025-09-04 DIAGNOSIS — N32.81 DETRUSOR INSTABILITY OF BLADDER: ICD-10-CM

## 2025-09-04 PROCEDURE — 76770 US EXAM ABDO BACK WALL COMP: CPT | Mod: 26,,, | Performed by: RADIOLOGY

## 2025-09-04 PROCEDURE — 76770 US EXAM ABDO BACK WALL COMP: CPT | Mod: TC,PO

## (undated) DEVICE — GLOVE SENSICARE PI ALOE 7

## (undated) DEVICE — SUT STRATAFIX SPIRAL VIOLET

## (undated) DEVICE — GLOVE SENSICARE PI ALOE 6

## (undated) DEVICE — DRAPE ORTH SPLIT 77X108IN

## (undated) DEVICE — BNDG COFLEX FOAM LF2 ST 6X5YD

## (undated) DEVICE — DRAPE STERI U-SHAPED 47X51IN

## (undated) DEVICE — SYS LABEL CORRECT MED

## (undated) DEVICE — STRIP MEDI WND CLSR 1/2X4IN

## (undated) DEVICE — STRAP OR TABLE 5IN X 72IN

## (undated) DEVICE — SUT ETHICON 3-0 BLK MONO PS

## (undated) DEVICE — DRAPE THREE-QTR REINF 53X77IN

## (undated) DEVICE — GLOVE SENSICARE PI GRN 7

## (undated) DEVICE — TRAY EPIDURAL CONT

## (undated) DEVICE — GLOVE SENSICARE PI GRN 8.5

## (undated) DEVICE — GOWN POLY REINF BRTH SLV XL

## (undated) DEVICE — GUIDEWIRE 80CM 1.5MM .035

## (undated) DEVICE — BIT DRILL POLARUS SURGIBIT LON

## (undated) DEVICE — TOWEL OR DISP STRL BLUE 4/PK

## (undated) DEVICE — SPONGE BULKEE II ABSRB 6X6.75

## (undated) DEVICE — TUBING MINIBORE EXTENSION

## (undated) DEVICE — DRAPE INCISE IOBAN 2 23X17IN

## (undated) DEVICE — NDL TUOHY EPIDURAL 20G X 3.5

## (undated) DEVICE — STOCKINETTE IMPERV INTRM 9X44

## (undated) DEVICE — APPLICATOR CHLORAPREP ORN 26ML

## (undated) DEVICE — DRAPE STERI INSTRUMENT 1018

## (undated) DEVICE — DRAPE U SPLIT SHEET 54X76IN

## (undated) DEVICE — PACK CUSTOM UNIV BASIN SLI

## (undated) DEVICE — PACK SIRUS BASIC V SURG STRL

## (undated) DEVICE — COLLAR CERVICAL UNIVERSAL 3

## (undated) DEVICE — PAD CAST SPECIALIST STRL 4

## (undated) DEVICE — Device

## (undated) DEVICE — GOWN POLY REINF BRTH SLV LG

## (undated) DEVICE — YANKAUER OPEN TIP W/O VENT

## (undated) DEVICE — ELECTRODE REM PLYHSV RETURN 9

## (undated) DEVICE — GLOVE SENSICARE PI GRN 7.5

## (undated) DEVICE — SUT MONOCRYL 3-0 PS-1

## (undated) DEVICE — GLOVE SURG ULTRA TOUCH 8.5

## (undated) DEVICE — GLOVE SENSICARE PI GRN 6

## (undated) DEVICE — BANDAGE MATRIX HK LOOP 4IN 5YD

## (undated) DEVICE — SUT 0 VICRYL / CT-1

## (undated) DEVICE — GLOVE SENSICARE PI ALOE 6.5

## (undated) DEVICE — DRILL QUICK RELEASE 2.8MM 5IN

## (undated) DEVICE — SLEEVE SCD EXPRESS KNEE MEDIUM